# Patient Record
Sex: MALE | Race: OTHER | Employment: UNEMPLOYED | URBAN - METROPOLITAN AREA
[De-identification: names, ages, dates, MRNs, and addresses within clinical notes are randomized per-mention and may not be internally consistent; named-entity substitution may affect disease eponyms.]

---

## 2018-02-05 ENCOUNTER — APPOINTMENT (OUTPATIENT)
Dept: GENERAL RADIOLOGY | Age: 68
DRG: 673 | End: 2018-02-05
Payer: COMMERCIAL

## 2018-02-05 ENCOUNTER — HOSPITAL ENCOUNTER (INPATIENT)
Age: 68
LOS: 22 days | Discharge: HOSPICE | DRG: 673 | End: 2018-02-27
Attending: EMERGENCY MEDICINE | Admitting: INTERNAL MEDICINE
Payer: COMMERCIAL

## 2018-02-05 DIAGNOSIS — G62.9 NEUROPATHY: Primary | ICD-10-CM

## 2018-02-05 DIAGNOSIS — J44.1 COPD EXACERBATION (HCC): ICD-10-CM

## 2018-02-05 DIAGNOSIS — K92.2 GASTROINTESTINAL HEMORRHAGE, UNSPECIFIED GASTROINTESTINAL HEMORRHAGE TYPE: ICD-10-CM

## 2018-02-05 DIAGNOSIS — D64.9 ANEMIA, UNSPECIFIED TYPE: ICD-10-CM

## 2018-02-05 LAB
ABSOLUTE EOS #: 0 K/UL (ref 0–0.4)
ABSOLUTE IMMATURE GRANULOCYTE: ABNORMAL K/UL (ref 0–0.3)
ABSOLUTE LYMPH #: 0.8 K/UL (ref 1–4.8)
ABSOLUTE MONO #: 0.8 K/UL (ref 0.1–1.3)
ANION GAP SERPL CALCULATED.3IONS-SCNC: 12 MMOL/L (ref 9–17)
BASOPHILS # BLD: 1 % (ref 0–2)
BASOPHILS ABSOLUTE: 0.1 K/UL (ref 0–0.2)
BUN BLDV-MCNC: 77 MG/DL (ref 8–23)
BUN/CREAT BLD: ABNORMAL (ref 9–20)
CALCIUM SERPL-MCNC: 9 MG/DL (ref 8.6–10.4)
CHLORIDE BLD-SCNC: 104 MMOL/L (ref 98–107)
CO2: 23 MMOL/L (ref 20–31)
CREAT SERPL-MCNC: 3.7 MG/DL (ref 0.7–1.2)
DIFFERENTIAL TYPE: ABNORMAL
EOSINOPHILS RELATIVE PERCENT: 1 % (ref 0–4)
GFR AFRICAN AMERICAN: 20 ML/MIN
GFR NON-AFRICAN AMERICAN: 16 ML/MIN
GFR SERPL CREATININE-BSD FRML MDRD: ABNORMAL ML/MIN/{1.73_M2}
GFR SERPL CREATININE-BSD FRML MDRD: ABNORMAL ML/MIN/{1.73_M2}
GLUCOSE BLD-MCNC: 87 MG/DL (ref 70–99)
HCT VFR BLD CALC: 21.6 % (ref 41–53)
HEMOGLOBIN: 7 G/DL (ref 13.5–17.5)
IMMATURE GRANULOCYTES: ABNORMAL %
LYMPHOCYTES # BLD: 12 % (ref 24–44)
MCH RBC QN AUTO: 34 PG (ref 26–34)
MCHC RBC AUTO-ENTMCNC: 32.2 G/DL (ref 31–37)
MCV RBC AUTO: 105.7 FL (ref 80–100)
MONOCYTES # BLD: 13 % (ref 1–7)
NRBC AUTOMATED: ABNORMAL PER 100 WBC
PDW BLD-RTO: 16 % (ref 11.5–14.9)
PLATELET # BLD: 234 K/UL (ref 150–450)
PLATELET ESTIMATE: ABNORMAL
PMV BLD AUTO: 7.5 FL (ref 6–12)
POTASSIUM SERPL-SCNC: 5.6 MMOL/L (ref 3.7–5.3)
RBC # BLD: 2.05 M/UL (ref 4.5–5.9)
RBC # BLD: ABNORMAL 10*6/UL
SEG NEUTROPHILS: 73 % (ref 36–66)
SEGMENTED NEUTROPHILS ABSOLUTE COUNT: 5.1 K/UL (ref 1.3–9.1)
SODIUM BLD-SCNC: 139 MMOL/L (ref 135–144)
WBC # BLD: 6.8 K/UL (ref 3.5–11)
WBC # BLD: ABNORMAL 10*3/UL

## 2018-02-05 PROCEDURE — 71046 X-RAY EXAM CHEST 2 VIEWS: CPT

## 2018-02-05 PROCEDURE — 80048 BASIC METABOLIC PNL TOTAL CA: CPT

## 2018-02-05 PROCEDURE — P9016 RBC LEUKOCYTES REDUCED: HCPCS

## 2018-02-05 PROCEDURE — 86920 COMPATIBILITY TEST SPIN: CPT

## 2018-02-05 PROCEDURE — 86850 RBC ANTIBODY SCREEN: CPT

## 2018-02-05 PROCEDURE — 94664 DEMO&/EVAL PT USE INHALER: CPT

## 2018-02-05 PROCEDURE — 99285 EMERGENCY DEPT VISIT HI MDM: CPT

## 2018-02-05 PROCEDURE — 96374 THER/PROPH/DIAG INJ IV PUSH: CPT

## 2018-02-05 PROCEDURE — 36430 TRANSFUSION BLD/BLD COMPNT: CPT

## 2018-02-05 PROCEDURE — 6360000002 HC RX W HCPCS: Performed by: EMERGENCY MEDICINE

## 2018-02-05 PROCEDURE — 86901 BLOOD TYPING SEROLOGIC RH(D): CPT

## 2018-02-05 PROCEDURE — 85025 COMPLETE CBC W/AUTO DIFF WBC: CPT

## 2018-02-05 PROCEDURE — 36415 COLL VENOUS BLD VENIPUNCTURE: CPT

## 2018-02-05 PROCEDURE — 94640 AIRWAY INHALATION TREATMENT: CPT

## 2018-02-05 PROCEDURE — 86900 BLOOD TYPING SEROLOGIC ABO: CPT

## 2018-02-05 PROCEDURE — 2060000000 HC ICU INTERMEDIATE R&B

## 2018-02-05 PROCEDURE — 94762 N-INVAS EAR/PLS OXIMTRY CONT: CPT

## 2018-02-05 RX ORDER — IPRATROPIUM BROMIDE AND ALBUTEROL SULFATE 2.5; .5 MG/3ML; MG/3ML
1 SOLUTION RESPIRATORY (INHALATION)
Status: DISCONTINUED | OUTPATIENT
Start: 2018-02-05 | End: 2018-02-27 | Stop reason: HOSPADM

## 2018-02-05 RX ORDER — 0.9 % SODIUM CHLORIDE 0.9 %
250 INTRAVENOUS SOLUTION INTRAVENOUS ONCE
Status: DISCONTINUED | OUTPATIENT
Start: 2018-02-05 | End: 2018-02-12

## 2018-02-05 RX ORDER — SODIUM CHLORIDE 0.9 % (FLUSH) 0.9 %
10 SYRINGE (ML) INJECTION PRN
Status: DISCONTINUED | OUTPATIENT
Start: 2018-02-05 | End: 2018-02-27 | Stop reason: HOSPADM

## 2018-02-05 RX ORDER — METHYLPREDNISOLONE SODIUM SUCCINATE 125 MG/2ML
125 INJECTION, POWDER, LYOPHILIZED, FOR SOLUTION INTRAMUSCULAR; INTRAVENOUS ONCE
Status: COMPLETED | OUTPATIENT
Start: 2018-02-05 | End: 2018-02-05

## 2018-02-05 RX ORDER — SODIUM CHLORIDE 0.9 % (FLUSH) 0.9 %
10 SYRINGE (ML) INJECTION EVERY 12 HOURS
Status: DISCONTINUED | OUTPATIENT
Start: 2018-02-05 | End: 2018-02-27 | Stop reason: HOSPADM

## 2018-02-05 RX ORDER — IPRATROPIUM BROMIDE AND ALBUTEROL SULFATE 2.5; .5 MG/3ML; MG/3ML
SOLUTION RESPIRATORY (INHALATION)
Status: COMPLETED
Start: 2018-02-05 | End: 2018-02-06

## 2018-02-05 RX ADMIN — METHYLPREDNISOLONE SODIUM SUCCINATE 125 MG: 125 INJECTION, POWDER, FOR SOLUTION INTRAMUSCULAR; INTRAVENOUS at 21:23

## 2018-02-05 ASSESSMENT — ENCOUNTER SYMPTOMS
COUGH: 1
FACIAL SWELLING: 0
EYE DISCHARGE: 0
BACK PAIN: 0
SINUS PRESSURE: 0
SHORTNESS OF BREATH: 1
BLOOD IN STOOL: 0
EYE PAIN: 0
CONSTIPATION: 0
DIARRHEA: 0
CHEST TIGHTNESS: 0
NAUSEA: 0
EYE REDNESS: 0
RHINORRHEA: 0
COLOR CHANGE: 0
WHEEZING: 1
SORE THROAT: 0
VOMITING: 0
TROUBLE SWALLOWING: 0
ABDOMINAL PAIN: 0

## 2018-02-06 ENCOUNTER — APPOINTMENT (OUTPATIENT)
Dept: CT IMAGING | Age: 68
DRG: 673 | End: 2018-02-06
Payer: COMMERCIAL

## 2018-02-06 ENCOUNTER — APPOINTMENT (OUTPATIENT)
Dept: ULTRASOUND IMAGING | Age: 68
DRG: 673 | End: 2018-02-06
Payer: COMMERCIAL

## 2018-02-06 ENCOUNTER — APPOINTMENT (OUTPATIENT)
Dept: GENERAL RADIOLOGY | Age: 68
DRG: 673 | End: 2018-02-06
Payer: COMMERCIAL

## 2018-02-06 PROBLEM — I71.20 ANEURYSM OF THORACIC AORTA: Status: ACTIVE | Noted: 2018-02-06

## 2018-02-06 PROBLEM — I71.40 ABDOMINAL AORTIC ANEURYSM (AAA): Status: ACTIVE | Noted: 2018-02-06

## 2018-02-06 LAB
-: ABNORMAL
ABSOLUTE RETIC #: 0.05 M/UL (ref 0.02–0.1)
ALBUMIN SERPL-MCNC: 2.7 G/DL (ref 3.5–5.2)
ALBUMIN/GLOBULIN RATIO: ABNORMAL (ref 1–2.5)
ALLEN TEST: ABNORMAL
ALP BLD-CCNC: 57 U/L (ref 40–129)
ALT SERPL-CCNC: 14 U/L (ref 5–41)
AMORPHOUS: ABNORMAL
ANION GAP SERPL CALCULATED.3IONS-SCNC: 12 MMOL/L (ref 9–17)
ANION GAP SERPL CALCULATED.3IONS-SCNC: 13 MMOL/L (ref 9–17)
ANION GAP SERPL CALCULATED.3IONS-SCNC: 14 MMOL/L (ref 9–17)
AST SERPL-CCNC: 13 U/L
BACTERIA: ABNORMAL
BILIRUB SERPL-MCNC: <0.15 MG/DL (ref 0.3–1.2)
BILIRUBIN DIRECT: <0.08 MG/DL
BILIRUBIN URINE: NEGATIVE
BILIRUBIN, INDIRECT: ABNORMAL MG/DL (ref 0–1)
BNP INTERPRETATION: ABNORMAL
BUN BLDV-MCNC: 76 MG/DL (ref 8–23)
BUN BLDV-MCNC: 78 MG/DL (ref 8–23)
BUN BLDV-MCNC: 79 MG/DL (ref 8–23)
BUN/CREAT BLD: ABNORMAL (ref 9–20)
CALCIUM SERPL-MCNC: 8.9 MG/DL (ref 8.6–10.4)
CARBOXYHEMOGLOBIN: 0.9 %
CASTS UA: ABNORMAL /LPF
CHLORIDE BLD-SCNC: 103 MMOL/L (ref 98–107)
CHLORIDE BLD-SCNC: 104 MMOL/L (ref 98–107)
CHLORIDE BLD-SCNC: 106 MMOL/L (ref 98–107)
CHLORIDE, UR: 104 MMOL/L
CO2: 23 MMOL/L (ref 20–31)
CO2: 24 MMOL/L (ref 20–31)
CO2: 24 MMOL/L (ref 20–31)
COLOR: YELLOW
COMMENT UA: ABNORMAL
CREAT SERPL-MCNC: 3.74 MG/DL (ref 0.7–1.2)
CREAT SERPL-MCNC: 3.79 MG/DL (ref 0.7–1.2)
CREAT SERPL-MCNC: 3.86 MG/DL (ref 0.7–1.2)
CREATININE URINE: 26.4 MG/DL (ref 39–259)
CRYSTALS, UA: ABNORMAL /HPF
DATE, STOOL #1: NORMAL
DATE, STOOL #2: NORMAL
DATE, STOOL #3: NORMAL
DIRECT EXAM: NORMAL
EOSINOPHIL,URINE: NORMAL
EPITHELIAL CELLS UA: ABNORMAL /HPF
FIO2: ABNORMAL
GFR AFRICAN AMERICAN: 19 ML/MIN
GFR AFRICAN AMERICAN: 19 ML/MIN
GFR AFRICAN AMERICAN: 20 ML/MIN
GFR NON-AFRICAN AMERICAN: 16 ML/MIN
GFR SERPL CREATININE-BSD FRML MDRD: ABNORMAL ML/MIN/{1.73_M2}
GLOBULIN: ABNORMAL G/DL (ref 1.5–3.8)
GLUCOSE BLD-MCNC: 117 MG/DL (ref 70–99)
GLUCOSE BLD-MCNC: 150 MG/DL (ref 75–110)
GLUCOSE BLD-MCNC: 157 MG/DL (ref 70–99)
GLUCOSE BLD-MCNC: 169 MG/DL (ref 70–99)
GLUCOSE URINE: NEGATIVE
HAPTOGLOBIN: 231 MG/DL (ref 30–200)
HCO3 ARTERIAL: 25.1 MMOL/L
HCT VFR BLD CALC: 22.4 % (ref 41–53)
HCT VFR BLD CALC: 23.2 % (ref 41–53)
HCT VFR BLD CALC: 24.8 % (ref 41–53)
HEMOCCULT SP1 STL QL: NEGATIVE
HEMOCCULT SP2 STL QL: NORMAL
HEMOCCULT SP3 STL QL: NORMAL
HEMOGLOBIN: 7.1 G/DL (ref 13.5–17.5)
HEMOGLOBIN: 7.5 G/DL (ref 13.5–17.5)
HEMOGLOBIN: 7.9 G/DL (ref 13.5–17.5)
IMMATURE RETIC FRACT: ABNORMAL %
IRON SATURATION: 13 % (ref 20–55)
IRON: 31 UG/DL (ref 59–158)
KETONES, URINE: NEGATIVE
LACTATE DEHYDROGENASE: 117 U/L (ref 135–225)
LEUKOCYTE ESTERASE, URINE: NEGATIVE
LV EF: 23 %
LVEF MODALITY: NORMAL
Lab: NORMAL
MAGNESIUM: 2.2 MG/DL (ref 1.6–2.6)
MCH RBC QN AUTO: 32.2 PG (ref 26–34)
MCHC RBC AUTO-ENTMCNC: 31.9 G/DL (ref 31–37)
MCV RBC AUTO: 101.2 FL (ref 80–100)
METHEMOGLOBIN: 1.1 %
MODE: ABNORMAL
MUCUS: ABNORMAL
NEGATIVE BASE EXCESS, ART: 2.4 MMOL/L (ref 0–2)
NITRITE, URINE: NEGATIVE
NOTIFICATION TIME: ABNORMAL
NOTIFICATION: ABNORMAL
NRBC AUTOMATED: ABNORMAL PER 100 WBC
O2 DEVICE/FLOW/%: ABNORMAL
O2 SAT, ARTERIAL: 96.4 %
OTHER OBSERVATIONS UA: ABNORMAL
OXYHEMOGLOBIN: ABNORMAL % (ref 95–98)
PATIENT TEMP: 37
PCO2 ARTERIAL: 60.2 MMHG
PCO2, ART, TEMP ADJ: ABNORMAL
PDW BLD-RTO: 17.8 % (ref 11.5–14.9)
PEEP/CPAP: ABNORMAL
PH ARTERIAL: 7.23
PH UA: 5 (ref 5–8)
PH, ART, TEMP ADJ: ABNORMAL
PHOSPHORUS: 6.6 MG/DL (ref 2.5–4.5)
PLATELET # BLD: 234 K/UL (ref 150–450)
PMV BLD AUTO: 7 FL (ref 6–12)
PO2 ARTERIAL: 112 MMHG
PO2, ART, TEMP ADJ: ABNORMAL MMHG
POSITIVE BASE EXCESS, ART: ABNORMAL MMOL/L (ref 0–2)
POTASSIUM SERPL-SCNC: 4.9 MMOL/L (ref 3.7–5.3)
POTASSIUM SERPL-SCNC: 5.4 MMOL/L (ref 3.7–5.3)
POTASSIUM SERPL-SCNC: 5.5 MMOL/L (ref 3.7–5.3)
POTASSIUM SERPL-SCNC: 6 MMOL/L (ref 3.7–5.3)
POTASSIUM, UR: 21.2 MMOL/L
PRO-BNP: ABNORMAL PG/ML
PROTEIN UA: ABNORMAL
PSV: ABNORMAL
PT. POSITION: ABNORMAL
RBC # BLD: 2.45 M/UL (ref 4.5–5.9)
RBC UA: ABNORMAL /HPF
RENAL EPITHELIAL, UA: ABNORMAL /HPF
RESPIRATORY RATE: 20
RETIC %: 2.1 % (ref 0.5–2)
RETIC HEMOGLOBIN: ABNORMAL PG (ref 28.2–35.7)
SAMPLE SITE: ABNORMAL
SET RATE: ABNORMAL
SODIUM BLD-SCNC: 140 MMOL/L (ref 135–144)
SODIUM BLD-SCNC: 141 MMOL/L (ref 135–144)
SODIUM BLD-SCNC: 142 MMOL/L (ref 135–144)
SODIUM,UR: 112 MMOL/L
SPECIFIC GRAVITY UA: 1.01 (ref 1–1.03)
SPECIMEN DESCRIPTION: NORMAL
STATUS: NORMAL
TEXT FOR RESPIRATORY: ABNORMAL
TIME, STOOL #1: NORMAL
TIME, STOOL #2: NORMAL
TIME, STOOL #3: NORMAL
TOTAL CK: 52 U/L (ref 39–308)
TOTAL HB: ABNORMAL G/DL (ref 12–16)
TOTAL IRON BINDING CAPACITY: 230 UG/DL (ref 250–450)
TOTAL PROTEIN, URINE: 153 MG/DL
TOTAL PROTEIN: 6.5 G/DL (ref 6.4–8.3)
TOTAL RATE: ABNORMAL
TRICHOMONAS: ABNORMAL
TROPONIN INTERP: NORMAL
TROPONIN INTERP: NORMAL
TROPONIN T: <0.03 NG/ML
TROPONIN T: <0.03 NG/ML
TURBIDITY: CLEAR
UNSATURATED IRON BINDING CAPACITY: 199 UG/DL (ref 112–347)
UREA NITROGEN, UR: 316 MG/DL
URINE HGB: ABNORMAL
UROBILINOGEN, URINE: NORMAL
VT: ABNORMAL
WBC # BLD: 4.9 K/UL (ref 3.5–11)
WBC UA: ABNORMAL /HPF
YEAST: ABNORMAL

## 2018-02-06 PROCEDURE — 2580000003 HC RX 258: Performed by: INTERNAL MEDICINE

## 2018-02-06 PROCEDURE — 86738 MYCOPLASMA ANTIBODY: CPT

## 2018-02-06 PROCEDURE — 85027 COMPLETE CBC AUTOMATED: CPT

## 2018-02-06 PROCEDURE — 83615 LACTATE (LD) (LDH) ENZYME: CPT

## 2018-02-06 PROCEDURE — 82272 OCCULT BLD FECES 1-3 TESTS: CPT

## 2018-02-06 PROCEDURE — 94762 N-INVAS EAR/PLS OXIMTRY CONT: CPT

## 2018-02-06 PROCEDURE — 84156 ASSAY OF PROTEIN URINE: CPT

## 2018-02-06 PROCEDURE — 84132 ASSAY OF SERUM POTASSIUM: CPT

## 2018-02-06 PROCEDURE — 94640 AIRWAY INHALATION TREATMENT: CPT

## 2018-02-06 PROCEDURE — 6370000000 HC RX 637 (ALT 250 FOR IP): Performed by: STUDENT IN AN ORGANIZED HEALTH CARE EDUCATION/TRAINING PROGRAM

## 2018-02-06 PROCEDURE — 82947 ASSAY GLUCOSE BLOOD QUANT: CPT

## 2018-02-06 PROCEDURE — 84100 ASSAY OF PHOSPHORUS: CPT

## 2018-02-06 PROCEDURE — 85018 HEMOGLOBIN: CPT

## 2018-02-06 PROCEDURE — 82805 BLOOD GASES W/O2 SATURATION: CPT

## 2018-02-06 PROCEDURE — 82436 ASSAY OF URINE CHLORIDE: CPT

## 2018-02-06 PROCEDURE — 83735 ASSAY OF MAGNESIUM: CPT

## 2018-02-06 PROCEDURE — 2060000000 HC ICU INTERMEDIATE R&B

## 2018-02-06 PROCEDURE — 80048 BASIC METABOLIC PNL TOTAL CA: CPT

## 2018-02-06 PROCEDURE — 51798 US URINE CAPACITY MEASURE: CPT

## 2018-02-06 PROCEDURE — 83010 ASSAY OF HAPTOGLOBIN QUANT: CPT

## 2018-02-06 PROCEDURE — 94660 CPAP INITIATION&MGMT: CPT

## 2018-02-06 PROCEDURE — 6360000002 HC RX W HCPCS: Performed by: INTERNAL MEDICINE

## 2018-02-06 PROCEDURE — 87804 INFLUENZA ASSAY W/OPTIC: CPT

## 2018-02-06 PROCEDURE — 36415 COLL VENOUS BLD VENIPUNCTURE: CPT

## 2018-02-06 PROCEDURE — 82607 VITAMIN B-12: CPT

## 2018-02-06 PROCEDURE — 84484 ASSAY OF TROPONIN QUANT: CPT

## 2018-02-06 PROCEDURE — 80076 HEPATIC FUNCTION PANEL: CPT

## 2018-02-06 PROCEDURE — 93306 TTE W/DOPPLER COMPLETE: CPT

## 2018-02-06 PROCEDURE — 6370000000 HC RX 637 (ALT 250 FOR IP): Performed by: INTERNAL MEDICINE

## 2018-02-06 PROCEDURE — 6370000000 HC RX 637 (ALT 250 FOR IP): Performed by: NURSE PRACTITIONER

## 2018-02-06 PROCEDURE — 82746 ASSAY OF FOLIC ACID SERUM: CPT

## 2018-02-06 PROCEDURE — 83550 IRON BINDING TEST: CPT

## 2018-02-06 PROCEDURE — 83880 ASSAY OF NATRIURETIC PEPTIDE: CPT

## 2018-02-06 PROCEDURE — 6370000000 HC RX 637 (ALT 250 FOR IP)

## 2018-02-06 PROCEDURE — 87205 SMEAR GRAM STAIN: CPT

## 2018-02-06 PROCEDURE — 82570 ASSAY OF URINE CREATININE: CPT

## 2018-02-06 PROCEDURE — 6360000002 HC RX W HCPCS: Performed by: NURSE PRACTITIONER

## 2018-02-06 PROCEDURE — 81001 URINALYSIS AUTO W/SCOPE: CPT

## 2018-02-06 PROCEDURE — 71045 X-RAY EXAM CHEST 1 VIEW: CPT

## 2018-02-06 PROCEDURE — 85014 HEMATOCRIT: CPT

## 2018-02-06 PROCEDURE — C9113 INJ PANTOPRAZOLE SODIUM, VIA: HCPCS | Performed by: NURSE PRACTITIONER

## 2018-02-06 PROCEDURE — 2580000003 HC RX 258: Performed by: EMERGENCY MEDICINE

## 2018-02-06 PROCEDURE — 87449 NOS EACH ORGANISM AG IA: CPT

## 2018-02-06 PROCEDURE — 82550 ASSAY OF CK (CPK): CPT

## 2018-02-06 PROCEDURE — 87899 AGENT NOS ASSAY W/OPTIC: CPT

## 2018-02-06 PROCEDURE — 71250 CT THORAX DX C-: CPT

## 2018-02-06 PROCEDURE — 51702 INSERT TEMP BLADDER CATH: CPT

## 2018-02-06 PROCEDURE — 76770 US EXAM ABDO BACK WALL COMP: CPT

## 2018-02-06 PROCEDURE — 99291 CRITICAL CARE FIRST HOUR: CPT | Performed by: INTERNAL MEDICINE

## 2018-02-06 PROCEDURE — 36600 WITHDRAWAL OF ARTERIAL BLOOD: CPT

## 2018-02-06 PROCEDURE — 84540 ASSAY OF URINE/UREA-N: CPT

## 2018-02-06 PROCEDURE — 84300 ASSAY OF URINE SODIUM: CPT

## 2018-02-06 PROCEDURE — 2580000003 HC RX 258: Performed by: NURSE PRACTITIONER

## 2018-02-06 PROCEDURE — 84133 ASSAY OF URINE POTASSIUM: CPT

## 2018-02-06 PROCEDURE — 83540 ASSAY OF IRON: CPT

## 2018-02-06 PROCEDURE — 85045 AUTOMATED RETICULOCYTE COUNT: CPT

## 2018-02-06 PROCEDURE — 94664 DEMO&/EVAL PT USE INHALER: CPT

## 2018-02-06 PROCEDURE — 82728 ASSAY OF FERRITIN: CPT

## 2018-02-06 RX ORDER — OXYCODONE HYDROCHLORIDE AND ACETAMINOPHEN 5; 325 MG/1; MG/1
1 TABLET ORAL EVERY 6 HOURS PRN
Status: DISCONTINUED | OUTPATIENT
Start: 2018-02-06 | End: 2018-02-06

## 2018-02-06 RX ORDER — 0.9 % SODIUM CHLORIDE 0.9 %
10 VIAL (ML) INJECTION DAILY
Status: DISCONTINUED | OUTPATIENT
Start: 2018-02-06 | End: 2018-02-08

## 2018-02-06 RX ORDER — SODIUM BICARBONATE 650 MG/1
650 TABLET ORAL 2 TIMES DAILY
Status: DISCONTINUED | OUTPATIENT
Start: 2018-02-06 | End: 2018-02-12

## 2018-02-06 RX ORDER — TAMSULOSIN HYDROCHLORIDE 0.4 MG/1
0.4 CAPSULE ORAL DAILY
COMMUNITY

## 2018-02-06 RX ORDER — IPRATROPIUM BROMIDE AND ALBUTEROL SULFATE 2.5; .5 MG/3ML; MG/3ML
1 SOLUTION RESPIRATORY (INHALATION)
Status: DISCONTINUED | OUTPATIENT
Start: 2018-02-06 | End: 2018-02-24

## 2018-02-06 RX ORDER — TAMSULOSIN HYDROCHLORIDE 0.4 MG/1
0.4 CAPSULE ORAL DAILY
Status: DISCONTINUED | OUTPATIENT
Start: 2018-02-06 | End: 2018-02-12

## 2018-02-06 RX ORDER — DEXTROSE MONOHYDRATE 25 G/50ML
12.5 INJECTION, SOLUTION INTRAVENOUS PRN
Status: DISCONTINUED | OUTPATIENT
Start: 2018-02-06 | End: 2018-02-27 | Stop reason: HOSPADM

## 2018-02-06 RX ORDER — ESCITALOPRAM OXALATE 20 MG/1
20 TABLET ORAL DAILY
Status: DISCONTINUED | OUTPATIENT
Start: 2018-02-06 | End: 2018-02-12

## 2018-02-06 RX ORDER — SODIUM CHLORIDE 0.9 % (FLUSH) 0.9 %
10 SYRINGE (ML) INJECTION EVERY 12 HOURS SCHEDULED
Status: DISCONTINUED | OUTPATIENT
Start: 2018-02-06 | End: 2018-02-27 | Stop reason: HOSPADM

## 2018-02-06 RX ORDER — NICOTINE POLACRILEX 4 MG
15 LOZENGE BUCCAL PRN
Status: DISCONTINUED | OUTPATIENT
Start: 2018-02-06 | End: 2018-02-27 | Stop reason: HOSPADM

## 2018-02-06 RX ORDER — CARVEDILOL 6.25 MG/1
6.25 TABLET ORAL 2 TIMES DAILY WITH MEALS
COMMUNITY

## 2018-02-06 RX ORDER — DEXTROSE MONOHYDRATE 25 G/50ML
25 INJECTION, SOLUTION INTRAVENOUS ONCE
Status: COMPLETED | OUTPATIENT
Start: 2018-02-06 | End: 2018-02-06

## 2018-02-06 RX ORDER — SODIUM POLYSTYRENE SULFONATE 15 G/60ML
30 SUSPENSION ORAL; RECTAL ONCE
Status: COMPLETED | OUTPATIENT
Start: 2018-02-06 | End: 2018-02-06

## 2018-02-06 RX ORDER — OXYCODONE AND ACETAMINOPHEN 10; 325 MG/1; MG/1
1 TABLET ORAL EVERY 6 HOURS PRN
Status: DISCONTINUED | OUTPATIENT
Start: 2018-02-06 | End: 2018-02-06 | Stop reason: SDUPTHER

## 2018-02-06 RX ORDER — BISACODYL 10 MG
10 SUPPOSITORY, RECTAL RECTAL DAILY PRN
Status: DISCONTINUED | OUTPATIENT
Start: 2018-02-06 | End: 2018-02-27 | Stop reason: HOSPADM

## 2018-02-06 RX ORDER — DEXTROSE MONOHYDRATE 25 G/50ML
12.5 INJECTION, SOLUTION INTRAVENOUS ONCE
Status: COMPLETED | OUTPATIENT
Start: 2018-02-06 | End: 2018-02-06

## 2018-02-06 RX ORDER — ONDANSETRON 2 MG/ML
4 INJECTION INTRAMUSCULAR; INTRAVENOUS EVERY 6 HOURS PRN
Status: DISCONTINUED | OUTPATIENT
Start: 2018-02-06 | End: 2018-02-27 | Stop reason: HOSPADM

## 2018-02-06 RX ORDER — DEXTROSE MONOHYDRATE 50 MG/ML
100 INJECTION, SOLUTION INTRAVENOUS PRN
Status: DISCONTINUED | OUTPATIENT
Start: 2018-02-06 | End: 2018-02-27 | Stop reason: HOSPADM

## 2018-02-06 RX ORDER — M-VIT,TX,IRON,MINS/CALC/FOLIC 27MG-0.4MG
1 TABLET ORAL DAILY
Status: DISCONTINUED | OUTPATIENT
Start: 2018-02-06 | End: 2018-02-12

## 2018-02-06 RX ORDER — OXYCODONE HYDROCHLORIDE 5 MG/1
5 TABLET ORAL EVERY 6 HOURS PRN
Status: DISCONTINUED | OUTPATIENT
Start: 2018-02-06 | End: 2018-02-10

## 2018-02-06 RX ORDER — METHYLPREDNISOLONE SODIUM SUCCINATE 125 MG/2ML
60 INJECTION, POWDER, LYOPHILIZED, FOR SOLUTION INTRAMUSCULAR; INTRAVENOUS EVERY 6 HOURS
Status: DISCONTINUED | OUTPATIENT
Start: 2018-02-06 | End: 2018-02-07

## 2018-02-06 RX ORDER — DIAZEPAM 5 MG/1
5 TABLET ORAL EVERY 12 HOURS PRN
Status: DISCONTINUED | OUTPATIENT
Start: 2018-02-06 | End: 2018-02-22

## 2018-02-06 RX ORDER — NICOTINE 21 MG/24HR
1 PATCH, TRANSDERMAL 24 HOURS TRANSDERMAL DAILY PRN
Status: DISCONTINUED | OUTPATIENT
Start: 2018-02-06 | End: 2018-02-27 | Stop reason: HOSPADM

## 2018-02-06 RX ORDER — SODIUM CHLORIDE 0.9 % (FLUSH) 0.9 %
10 SYRINGE (ML) INJECTION PRN
Status: DISCONTINUED | OUTPATIENT
Start: 2018-02-06 | End: 2018-02-27 | Stop reason: HOSPADM

## 2018-02-06 RX ORDER — ATORVASTATIN CALCIUM 80 MG/1
80 TABLET, FILM COATED ORAL NIGHTLY
Status: DISCONTINUED | OUTPATIENT
Start: 2018-02-06 | End: 2018-02-06

## 2018-02-06 RX ORDER — HEPARIN SODIUM 5000 [USP'U]/ML
5000 INJECTION, SOLUTION INTRAVENOUS; SUBCUTANEOUS EVERY 12 HOURS SCHEDULED
Status: DISCONTINUED | OUTPATIENT
Start: 2018-02-06 | End: 2018-02-08

## 2018-02-06 RX ORDER — ALBUTEROL SULFATE 2.5 MG/3ML
2.5 SOLUTION RESPIRATORY (INHALATION)
Status: DISCONTINUED | OUTPATIENT
Start: 2018-02-06 | End: 2018-02-27 | Stop reason: HOSPADM

## 2018-02-06 RX ORDER — OXYCODONE HYDROCHLORIDE 5 MG/1
5 TABLET ORAL EVERY 6 HOURS PRN
Status: DISCONTINUED | OUTPATIENT
Start: 2018-02-06 | End: 2018-02-06

## 2018-02-06 RX ORDER — FUROSEMIDE 10 MG/ML
40 INJECTION INTRAMUSCULAR; INTRAVENOUS ONCE
Status: COMPLETED | OUTPATIENT
Start: 2018-02-06 | End: 2018-02-06

## 2018-02-06 RX ORDER — ACETAMINOPHEN 325 MG/1
650 TABLET ORAL EVERY 4 HOURS PRN
Status: DISCONTINUED | OUTPATIENT
Start: 2018-02-06 | End: 2018-02-27 | Stop reason: HOSPADM

## 2018-02-06 RX ORDER — ATORVASTATIN CALCIUM 80 MG/1
80 TABLET, FILM COATED ORAL NIGHTLY
Status: DISCONTINUED | OUTPATIENT
Start: 2018-02-06 | End: 2018-02-12

## 2018-02-06 RX ORDER — OXYCODONE HYDROCHLORIDE AND ACETAMINOPHEN 5; 325 MG/1; MG/1
1 TABLET ORAL EVERY 4 HOURS PRN
Status: DISCONTINUED | OUTPATIENT
Start: 2018-02-06 | End: 2018-02-10

## 2018-02-06 RX ORDER — FUROSEMIDE 10 MG/ML
40 INJECTION INTRAMUSCULAR; INTRAVENOUS 2 TIMES DAILY
Status: DISCONTINUED | OUTPATIENT
Start: 2018-02-06 | End: 2018-02-07

## 2018-02-06 RX ORDER — FERROUS SULFATE 325(65) MG
325 TABLET ORAL 2 TIMES DAILY
Status: DISCONTINUED | OUTPATIENT
Start: 2018-02-06 | End: 2018-02-06

## 2018-02-06 RX ORDER — SODIUM BICARBONATE 650 MG/1
650 TABLET ORAL 2 TIMES DAILY
COMMUNITY

## 2018-02-06 RX ORDER — PANTOPRAZOLE SODIUM 40 MG/10ML
40 INJECTION, POWDER, LYOPHILIZED, FOR SOLUTION INTRAVENOUS DAILY
Status: DISCONTINUED | OUTPATIENT
Start: 2018-02-06 | End: 2018-02-06

## 2018-02-06 RX ORDER — SODIUM BICARBONATE 650 MG/1
650 TABLET ORAL 2 TIMES DAILY
Status: DISCONTINUED | OUTPATIENT
Start: 2018-02-06 | End: 2018-02-06

## 2018-02-06 RX ORDER — HYDRALAZINE HYDROCHLORIDE 10 MG/1
10 TABLET, FILM COATED ORAL 3 TIMES DAILY
COMMUNITY

## 2018-02-06 RX ORDER — HYDRALAZINE HYDROCHLORIDE 10 MG/1
10 TABLET, FILM COATED ORAL 3 TIMES DAILY
Status: DISCONTINUED | OUTPATIENT
Start: 2018-02-06 | End: 2018-02-07

## 2018-02-06 RX ORDER — CARVEDILOL 6.25 MG/1
6.25 TABLET ORAL 2 TIMES DAILY WITH MEALS
Status: DISCONTINUED | OUTPATIENT
Start: 2018-02-06 | End: 2018-02-12

## 2018-02-06 RX ORDER — ASPIRIN 81 MG/1
81 TABLET ORAL DAILY
Status: DISCONTINUED | OUTPATIENT
Start: 2018-02-06 | End: 2018-02-08

## 2018-02-06 RX ORDER — FERROUS SULFATE 325(65) MG
325 TABLET ORAL 2 TIMES DAILY
Status: DISCONTINUED | OUTPATIENT
Start: 2018-02-06 | End: 2018-02-07

## 2018-02-06 RX ADMIN — METHYLPREDNISOLONE SODIUM SUCCINATE 60 MG: 125 INJECTION, POWDER, FOR SOLUTION INTRAMUSCULAR; INTRAVENOUS at 01:36

## 2018-02-06 RX ADMIN — IPRATROPIUM BROMIDE AND ALBUTEROL SULFATE 1 AMPULE: .5; 3 SOLUTION RESPIRATORY (INHALATION) at 07:15

## 2018-02-06 RX ADMIN — FERROUS SULFATE TAB 325 MG (65 MG ELEMENTAL FE) 325 MG: 325 (65 FE) TAB at 20:51

## 2018-02-06 RX ADMIN — ATORVASTATIN CALCIUM 80 MG: 80 TABLET, FILM COATED ORAL at 20:51

## 2018-02-06 RX ADMIN — METHYLPREDNISOLONE SODIUM SUCCINATE 60 MG: 125 INJECTION, POWDER, FOR SOLUTION INTRAMUSCULAR; INTRAVENOUS at 14:51

## 2018-02-06 RX ADMIN — IPRATROPIUM BROMIDE AND ALBUTEROL SULFATE 1 AMPULE: .5; 3 SOLUTION RESPIRATORY (INHALATION) at 19:59

## 2018-02-06 RX ADMIN — FERROUS SULFATE TAB 325 MG (65 MG ELEMENTAL FE) 325 MG: 325 (65 FE) TAB at 08:28

## 2018-02-06 RX ADMIN — METHYLPREDNISOLONE SODIUM SUCCINATE 60 MG: 125 INJECTION, POWDER, FOR SOLUTION INTRAMUSCULAR; INTRAVENOUS at 21:00

## 2018-02-06 RX ADMIN — SODIUM CHLORIDE 10 ML: 9 INJECTION, SOLUTION INTRAMUSCULAR; INTRAVENOUS; SUBCUTANEOUS at 08:28

## 2018-02-06 RX ADMIN — ASPIRIN 81 MG: 81 TABLET, COATED ORAL at 08:28

## 2018-02-06 RX ADMIN — MULTIPLE VITAMINS W/ MINERALS TAB 1 TABLET: TAB at 08:28

## 2018-02-06 RX ADMIN — HYDRALAZINE HYDROCHLORIDE 10 MG: 10 TABLET, FILM COATED ORAL at 14:51

## 2018-02-06 RX ADMIN — FUROSEMIDE 40 MG: 10 INJECTION, SOLUTION INTRAVENOUS at 16:26

## 2018-02-06 RX ADMIN — Medication 2 PUFF: at 20:52

## 2018-02-06 RX ADMIN — CARVEDILOL 6.25 MG: 6.25 TABLET, FILM COATED ORAL at 08:29

## 2018-02-06 RX ADMIN — METHYLPREDNISOLONE SODIUM SUCCINATE 60 MG: 125 INJECTION, POWDER, FOR SOLUTION INTRAMUSCULAR; INTRAVENOUS at 08:28

## 2018-02-06 RX ADMIN — SODIUM POLYSTYRENE SULFONATE 30 G: 15 SUSPENSION ORAL; RECTAL at 06:53

## 2018-02-06 RX ADMIN — IPRATROPIUM BROMIDE AND ALBUTEROL SULFATE 1 AMPULE: .5; 3 SOLUTION RESPIRATORY (INHALATION) at 15:09

## 2018-02-06 RX ADMIN — PANTOPRAZOLE SODIUM 40 MG: 40 INJECTION, POWDER, FOR SOLUTION INTRAVENOUS at 08:28

## 2018-02-06 RX ADMIN — ESCITALOPRAM OXALATE 20 MG: 20 TABLET ORAL at 08:28

## 2018-02-06 RX ADMIN — TAMSULOSIN HYDROCHLORIDE 0.4 MG: 0.4 CAPSULE ORAL at 08:29

## 2018-02-06 RX ADMIN — Medication 10 ML: at 07:47

## 2018-02-06 RX ADMIN — INSULIN LISPRO 1 UNITS: 100 INJECTION, SOLUTION INTRAVENOUS; SUBCUTANEOUS at 21:15

## 2018-02-06 RX ADMIN — CARVEDILOL 6.25 MG: 6.25 TABLET, FILM COATED ORAL at 16:26

## 2018-02-06 RX ADMIN — ENOXAPARIN SODIUM 30 MG: 30 INJECTION SUBCUTANEOUS at 08:28

## 2018-02-06 RX ADMIN — HYDRALAZINE HYDROCHLORIDE 10 MG: 10 TABLET, FILM COATED ORAL at 08:28

## 2018-02-06 RX ADMIN — INSULIN HUMAN 8 UNITS: 100 INJECTION, SOLUTION PARENTERAL at 18:36

## 2018-02-06 RX ADMIN — HYDRALAZINE HYDROCHLORIDE 10 MG: 10 TABLET, FILM COATED ORAL at 20:51

## 2018-02-06 RX ADMIN — FUROSEMIDE 40 MG: 10 INJECTION, SOLUTION INTRAVENOUS at 11:18

## 2018-02-06 RX ADMIN — Medication 10 ML: at 01:37

## 2018-02-06 RX ADMIN — SODIUM BICARBONATE 650 MG: 650 TABLET, ORALLY DISINTEGRATING ORAL at 08:28

## 2018-02-06 RX ADMIN — Medication 2 PUFF: at 08:29

## 2018-02-06 RX ADMIN — Medication 10 ML: at 08:29

## 2018-02-06 RX ADMIN — DEXTROSE MONOHYDRATE 25 G: 25 INJECTION, SOLUTION INTRAVENOUS at 07:44

## 2018-02-06 RX ADMIN — SODIUM POLYSTYRENE SULFONATE 30 G: 15 SUSPENSION ORAL; RECTAL at 18:45

## 2018-02-06 RX ADMIN — Medication 10 ML: at 21:04

## 2018-02-06 RX ADMIN — Medication 2 PUFF: at 01:37

## 2018-02-06 RX ADMIN — FUROSEMIDE 40 MG: 10 INJECTION, SOLUTION INTRAVENOUS at 01:36

## 2018-02-06 RX ADMIN — ALBUTEROL SULFATE 2.5 MG: 2.5 SOLUTION RESPIRATORY (INHALATION) at 04:16

## 2018-02-06 RX ADMIN — IPRATROPIUM BROMIDE AND ALBUTEROL SULFATE 1 AMPULE: .5; 3 SOLUTION RESPIRATORY (INHALATION) at 11:19

## 2018-02-06 RX ADMIN — DEXTROSE MONOHYDRATE 12.5 G: 25 INJECTION, SOLUTION INTRAVENOUS at 18:35

## 2018-02-06 RX ADMIN — INSULIN HUMAN 10 UNITS: 100 INJECTION, SOLUTION PARENTERAL at 07:44

## 2018-02-06 RX ADMIN — SODIUM BICARBONATE 650 MG: 650 TABLET, ORALLY DISINTEGRATING ORAL at 20:51

## 2018-02-06 ASSESSMENT — ENCOUNTER SYMPTOMS
COUGH: 1
ABDOMINAL PAIN: 0
CONSTIPATION: 1
SPUTUM PRODUCTION: 1
WHEEZING: 1
VOMITING: 0
ORTHOPNEA: 0
HEMOPTYSIS: 0
NAUSEA: 0
HEARTBURN: 0
BLOOD IN STOOL: 1
SHORTNESS OF BREATH: 1

## 2018-02-06 NOTE — PROGRESS NOTES
· Bronchodilator assessment   [x]    Bronchodilator Assessment        Bronchodilator assessment at level  prnBRONCHODILATOR ASSESSMENT SCORE  Score 1 2 3 4   Breath Sounds   []  Clear [x]  Mild Wheezing with good aeration []  Moderate I/E wheezing with adequate aeration []  Poor Aeration or diffuse wheezing   Respiratory Rate [x]  Less than 20 [x]  20-25 []  Greater than 25  []  Greater than 35    Dyspnea []  No SOB  [x]  SOB with minimal activity []  Speaking in partial sentences []  Acute/ At rest   Peakflow (asthma) []  80 % or greater predicted/PB  []  Unable []  70% or greater predicted/PB  []  Unable []  51%-70% predicted/PB  []  Unable []  Less than 50% predicted/PB  []  Unable due to distress   FEV1 % Predicted []  Greater than 69%  []  Unable  []  Less than 50%-69%  []  Unable  []  Less than 35%-49%  []  Unable  []  Less than 35%  []  Unable due to distress

## 2018-02-06 NOTE — PLAN OF CARE
Problem: Discharge Planning:  Goal: Discharged to appropriate level of care  Discharged to appropriate level of care   Outcome: Ongoing  Ongoing    Problem: Activity Intolerance:  Goal: Ability to tolerate increased activity will improve  Ability to tolerate increased activity will improve   Outcome: Ongoing  Pt is able to stand at bedside so far has yet to see him walk to bathroom. Problem: Airway Clearance - Ineffective:  Goal: Ability to maintain a clear airway will improve  Ability to maintain a clear airway will improve   Outcome: Ongoing  Pt has been able to keep a clear airway and is able to cough secretions up     Problem: Breathing Pattern - Ineffective:  Goal: Ability to achieve and maintain a regular respiratory rate will improve  Ability to achieve and maintain a regular respiratory rate will improve   Outcome: Ongoing  Pt is still breathing fast but SpO2 has been in the 90s since he arrived and tolerating the venti mask well . Problem: Gas Exchange - Impaired:  Goal: Levels of oxygenation will improve  Levels of oxygenation will improve   Outcome: Ongoing  Pt's SpO2 have been in the 90s since he arrived and has been tolerating venti mask well.

## 2018-02-06 NOTE — PROGRESS NOTES
Pt seen and examined at bedside. Resting comfortably on BipAP. Wheezing and ronchi still present. Vitals stable. Nursing present in room. Intake/Output: 1600 out, 360 in. New orders from Dr. Destiny Lynch for 1500 fluid restriction. BS's 169, on steroids, starting patient on low dose sliding scale.

## 2018-02-06 NOTE — ED NOTES
Pt is an alert and oriented x4 male who presents to the ER c/o increased SOB x2 weeks. Pt has a hx od COPD and wears 3L NC around the clock. Pt originally was sating at 83% on his oxygen when he came in. Pt was seen by Dr. Beauford Crigler on Thursday who ordered a chest x ray and lab work. Pt was placed on laveaflocacin and a tappered dose of prednisone. Pt states he has not gotten any better. Lung sounds are diminished and wheezes throughout all lung fields. Pt has new swelling in bilateral lower extremities.  [facility], [address] [place] [address]  02/05/18 2055

## 2018-02-06 NOTE — CONSULTS
injection 10 mL Q12H   sodium chloride flush 0.9 % injection 10 mL PRN       Allergies:  Codeine and Contrast [iodides]    Social History:   Social History     Social History    Marital status:      Spouse name: N/A    Number of children: N/A    Years of education: N/A     Occupational History    Not on file. Social History Main Topics    Smoking status: Current Every Day Smoker     Packs/day: 2.00     Types: Cigarettes    Smokeless tobacco: Not on file    Alcohol use No    Drug use: No    Sexual activity: Not on file     Other Topics Concern    Not on file     Social History Narrative    No narrative on file       Family History:   No family history on file. Review of Systems:    Constitutional: No fever, no chills, no night sweats, fatigue, generalized weakness, loss of appetite  HEENT:  No headache, otalgia, itchy eyes, epistaxis, nasal discharge or sore throat. Cardiac:  No chest pain, dyspnea, orthopnea or PND, palpitations  Chest:              No cough, hemoptysis, pleuritic chest pain, wheezing,SOB  Abdomen:  Pos abdominal pain, nausea, vomiting, diarrhea, melena, dysphagia hematemesis,constipation,   Neuro:              No CVA, TIA or seizure like activity. Skin:   No rashes, no itching. :   No hematuria, no pyuria, no dysuria, no flank pain. Extremities:  No swelling or joint pains.       Objective:  CURRENT TEMPERATURE:  Temp: 97.5 °F (36.4 °C)  MAXIMUM TEMPERATURE OVER 24HRS:  Temp (24hrs), Av.9 °F (36.6 °C), Min:97.5 °F (36.4 °C), Max:98.3 °F (36.8 °C)    CURRENT RESPIRATORY RATE:  Resp: 20  CURRENT PULSE:  Pulse: 80  CURRENT BLOOD PRESSURE:  BP: (!) 162/61  24HR BLOOD PRESSURE RANGE:  Systolic (39BMJ), PY , Min:133 , TTA:395   ; Diastolic (89ZWI), YA, Min:57, Max:83    24HR INTAKE/OUTPUT:    Intake/Output Summary (Last 24 hours) at 18 1015  Last data filed at 18 0055   Gross per 24 hour   Intake           181.67 ml   Output                0 ml   Net

## 2018-02-06 NOTE — ED PROVIDER NOTES
numbness and headaches. Psychiatric/Behavioral: Negative for confusion, decreased concentration, hallucinations, self-injury, sleep disturbance and suicidal ideas. PAST MEDICAL HISTORY     Past Medical History:   Diagnosis Date    Abdominal pain, chronic, generalized     Cervicodynia     Chronic back pain     Chronic kidney disease     secondary to renal vascular disease    Cigarette smoker     COPD [facility]chronic obstructive pulmonary disease) ([facility]     DDD (degenerative disc disease), lumbar     Depression     Family history of cancer     Hernia, ventral     Hyperlipidemia     Iron deficiency anemia     Lichen simplex chronicus     PUD (peptic ulcer disease)     Renal cyst     Type II or unspecified type diabetes mellitus without mention of complication, not stated as uncontrolled ([facility]     Vitamin D deficiency        SURGICAL HISTORY       Past Surgical History:   Procedure Laterality Date    ABDOMINAL AORTIC ANEURYSM REPAIR         CURRENT MEDICATIONS       Previous Medications    ALBUTEROL (PROVENTIL) (2.5 MG/3ML) 0.083% NEBULIZER SOLUTION    Take 2.5 mg by nebulization every 6 hours as needed. ASPIRIN 81 MG EC TABLET    Take 81 mg by mouth daily. DIAZEPAM (VALIUM) 5 MG TABLET    Take 5 mg by mouth every 12 hours as needed. ESCITALOPRAM (LEXAPRO) 20 MG TABLET    Take 20 mg by mouth daily. FAMOTIDINE (PEPCID) 20 MG TABLET    Take 20 mg by mouth 2 times daily. FLUTICASONE-SALMETEROL (ADVAIR DISKUS) 500-50 MCG/DOSE DISKUS INHALER    Inhale 1 puff into the lungs 2 times daily. IRON (FEOSOL PO)    Take 325 mg by mouth 2 times daily. LEVALBUTEROL (XOPENEX HFA) 45 MCG/ACT INHALER    Inhale 1-2 puffs into the lungs every 4 hours as needed. OXYCODONE-ACETAMINOPHEN (PERCOCET)  MG PER TABLET        ROSUVASTATIN (CRESTOR) 20 MG TABLET    Take 20 mg by mouth daily. THERAPEUTIC MULTIVITAMIN-MINERALS (THERAGRAN-M) TABLET    Take 1 tablet by mouth daily. TIOTROPIUM (SPIRIVA) 18 MCG INHALATION CAPSULE    Inhale 18 mcg into the lungs daily. ALLERGIES     is allergic to codeine and contrast [iodides]. SOCIAL HISTORY      reports that he has been smoking Cigarettes. He has been smoking about 2.00 packs per day. He does not have any smokeless tobacco history on file. He reports that he does not drink alcohol or use drugs. PHYSICAL EXAM     INITIAL VITALS: BP (!) 147/69   Pulse 77   Temp 97.9 °F (36.6 °C) (Oral)   Resp 22   SpO2 97%      Physical Exam   Constitutional: He is oriented to person, place, and time. He appears well-developed and well-nourished. No distress. HENT:   Head: Normocephalic and atraumatic. Eyes: Conjunctivae and EOM are normal. Pupils are equal, round, and reactive to light. Right eye exhibits no discharge. Left eye exhibits no discharge. No scleral icterus. Cardiovascular: Normal rate, regular rhythm and normal heart sounds. Exam reveals no gallop and no friction rub. No murmur heard. Pulmonary/Chest: Accessory muscle usage present. Tachypnea noted. He is in respiratory distress. He has decreased breath sounds. He has wheezes. He has no rhonchi. He has no rales. He exhibits no tenderness. Abdominal: Soft. Bowel sounds are normal. He exhibits no distension and no mass. There is no tenderness. There is no rebound and no guarding. Musculoskeletal: Normal range of motion. He exhibits no edema or tenderness. Neurological: He is alert and oriented to person, place, and time. He displays normal reflexes. No cranial nerve deficit. He exhibits normal muscle tone. Coordination normal.   Skin: Skin is warm and dry. No rash noted. He is not diaphoretic. No erythema. No pallor. Psychiatric: He has a normal mood and affect. His behavior is normal. Judgment and thought content normal.   Nursing note and vitals reviewed.       DIAGNOSTIC RESULTS     RADIOLOGY:All plain film, CT, MRI, and formal ultrasound images (except ED 2015 02/05/18 2030 02/05/18 2115   BP: (!) 147/76 (!) 141/74 (!) 142/69 (!) 147/69   Pulse:   81 77   Resp:   22 22   Temp:       TempSrc:       SpO2: 100% 100% 100% 97%       The patient was given the following medications while in the emergency department:  Orders Placed This Encounter   Medications    methylPREDNISolone sodium (SOLU-MEDROL) injection 125 mg    ipratropium-albuterol (DUONEB) 0.5-2.5 (3) MG/3ML nebulizer solution     SUREKHA CLINTON: cabinet override    ipratropium-albuterol (DUONEB) nebulizer solution 1 ampule    0.9 % sodium chloride bolus    sodium chloride flush 0.9 % injection 10 mL    sodium chloride flush 0.9 % injection 10 mL       -------------------------  9:48 PM  Patient was reevaluated and he did mention that he has noticed dark stools for the last 2 weeks. I told him that I would like to do a rectal exam to evaluate this and at this time he is refusing. He says he would rather wait and just give a stool sample. Since the patient's being admitted I am okay with this plan. I discussed the case with Dr. Josué Gould who is on call for his pulmonologist who is okay with the current plan with breathing treatments steroid and continuing his oral Levaquin as previously prescribed. Also spoke with Az Bennett the nurse practitioner who will admit the patient for further evaluation and treatment. CRITICAL CARE: There was a high probability of clinically significant/life threatening deterioration in this patient's condition which required my urgent intervention. Total critical care time was 30 minutes. This excludes any time for separately reportable procedures. CONSULTS:  IP CONSULT TO PRIMARY CARE PROVIDER  IP CONSULT TO PULMONOLOGY    PROCEDURES:  none    FINAL IMPRESSION      1. COPD exacerbation ([facility]    2. Gastrointestinal hemorrhage, unspecified gastrointestinal hemorrhage type    3.  Anemia, unspecified type          DISPOSITION/PLAN   DISPOSITION Decision To Admit

## 2018-02-06 NOTE — H&P
[address] [address]    HISTORY AND PHYSICAL EXAMINATION            Date:   2/6/2018  Patient name:  Venita Alexander  Date of admission:  2/5/2018  7:29 PM  MRN:   [id number]  Account:  [de-identified]  YOB: 1950  PCP:    Ran Perez MD  Room:   2012/2012-01  Code Status:    Full Code    Chief Complaint:     Chief Complaint   Patient presents with    Shortness of Breath       History Obtained From:     patient    History of Present Illness: The patient is a 75 y.o. Non-/non  male wit hPMHx of COPD on 3 LPM, Systolic CHF, and CKD baseline Cr of 2.90 who presents with Shortness of Breath  Patient was at PCP on 2/1 for similar complaint, and was given Levaquin and medrol dose pack PO. At the time, he had a productive cough, and chest pain with coughing. He had no improvement in his Sx, and came to ED for revaluation. In the ED, his O2 sat was at 85% on 3 LPM. He was given 4 LPM by Venturi mask. Pulmonology was consulted. CXR showed small B/L Pleural effusions and R sided hilar density. His Cr was also elevated at 3.70, so nephrology was consulted. Hgb was 7.0 in ED and he was given 1 U PRBC. Pt denied SANJAY, but reports possible bloody stools. He was given IV solumedrol bolus, and admitted to ICU intermediate   for the management of  COPD exacerbation with failure of outpatient treatment.         Past Medical History:     Past Medical History:   Diagnosis Date    Abdominal pain, chronic, generalized     Cervicodynia     Chronic back pain     Chronic kidney disease     secondary to renal vascular disease    Cigarette smoker     COPD (chronic obstructive pulmonary disease) (HCC)     DDD (degenerative disc disease), lumbar     Depression     Family history of cancer     Hernia, ventral     Hyperlipidemia     Iron deficiency anemia     Lichen simplex chronicus     PUD (peptic ulcer disease)     Renal cyst to light. Cardiovascular: Normal rate, regular rhythm, S1 normal and S2 normal.  Exam reveals distant heart sounds and decreased pulses. Pulmonary/Chest: Effort normal. He has wheezes (Diffuse end expiratory wheezes). Abdominal: He exhibits distension and mass. Bowel sounds are hypoactive. There is no tenderness. There is no rigidity and no guarding. Neurological: He is alert and oriented to person, place, and time. GCS score is 15. Skin: Skin is warm and dry.          Investigations:      Laboratory Testing:  Recent Results (from the past 24 hour(s))   Basic Metabolic Panel    Collection Time: 02/05/18  9:10 PM   Result Value Ref Range    Glucose 87 70 - 99 mg/dL    BUN 77 (H) 8 - 23 mg/dL    CREATININE 3.70 (H) 0.70 - 1.20 mg/dL    Bun/Cre Ratio NOT REPORTED 9 - 20    Calcium 9.0 8.6 - 10.4 mg/dL    Sodium 139 135 - 144 mmol/L    Potassium 5.6 (H) 3.7 - 5.3 mmol/L    Chloride 104 98 - 107 mmol/L    CO2 23 20 - 31 mmol/L    Anion Gap 12 9 - 17 mmol/L    GFR Non-African American 16 (L) >60 mL/min    GFR African American 20 (L) >60 mL/min    GFR Comment          GFR Staging NOT REPORTED    CBC Auto Differential    Collection Time: 02/05/18  9:10 PM   Result Value Ref Range    WBC 6.8 3.5 - 11.0 k/uL    RBC 2.05 (L) 4.5 - 5.9 m/uL    Hemoglobin 7.0 (LL) 13.5 - 17.5 g/dL    Hematocrit 21.6 (L) 41 - 53 %    .7 (H) 80 - 100 fL    MCH 34.0 26 - 34 pg    MCHC 32.2 31 - 37 g/dL    RDW 16.0 (H) 11.5 - 14.9 %    Platelets 999 815 - 788 k/uL    MPV 7.5 6.0 - 12.0 fL    NRBC Automated NOT REPORTED per 100 WBC    Differential Type NOT REPORTED     Immature Granulocytes NOT REPORTED 0 %    Absolute Immature Granulocyte NOT REPORTED 0.00 - 0.30 k/uL    WBC Morphology NOT REPORTED     RBC Morphology NOT REPORTED     Platelet Estimate NOT REPORTED     Seg Neutrophils 73 (H) 36 - 66 %    Lymphocytes 12 (L) 24 - 44 %    Monocytes 13 (H) 1 - 7 %    Eosinophils % 1 0 - 4 %    Basophils 1 0 - 2 %    Segs Absolute 5. 10 1.3 - 9.1 k/uL    Absolute Lymph # 0.80 (L) 1.0 - 4.8 k/uL    Absolute Mono # 0.80 0.1 - 1.3 k/uL    Absolute Eos # 0.00 0.0 - 0.4 k/uL    Basophils # 0.10 0.0 - 0.2 k/uL   TYPE AND SCREEN    Collection Time: 02/05/18  9:55 PM   Result Value Ref Range    Expiration Date 02/08/2018     Arm Band Number [id number]     ABO/Rh O POSITIVE     Antibody Screen NEGATIVE     Unit Number [id number]     Product Code Leukocyte Reduced Red Cell     Unit Divison 0     Dispense Status ISSUED     Transfusion Status OK TO TRANSFUSE     Crossmatch Result COMPATIBLE    Basic Metabolic Panel w/ Reflex to MG    Collection Time: 02/06/18  4:24 AM   Result Value Ref Range    Glucose 117 (H) 70 - 99 mg/dL    BUN 78 (H) 8 - 23 mg/dL    CREATININE 3.79 (H) 0.70 - 1.20 mg/dL    Bun/Cre Ratio NOT REPORTED 9 - 20    Calcium 8.9 8.6 - 10.4 mg/dL    Sodium 141 135 - 144 mmol/L    Potassium 6.0 (HH) 3.7 - 5.3 mmol/L    Chloride 106 98 - 107 mmol/L    CO2 23 20 - 31 mmol/L    Anion Gap 12 9 - 17 mmol/L    GFR Non-African American 16 (L) >60 mL/min    GFR  19 (L) >60 mL/min    GFR Comment          GFR Staging NOT REPORTED    CBC    Collection Time: 02/06/18  4:24 AM   Result Value Ref Range    WBC 4.9 3.5 - 11.0 k/uL    RBC 2.45 (L) 4.5 - 5.9 m/uL    Hemoglobin 7.9 (L) 13.5 - 17.5 g/dL    Hematocrit 24.8 (L) 41 - 53 %    .2 (H) 80 - 100 fL    MCH 32.2 26 - 34 pg    MCHC 31.9 31 - 37 g/dL    RDW 17.8 (H) 11.5 - 14.9 %    Platelets 834 185 - 599 k/uL    MPV 7.0 6.0 - 12.0 fL    NRBC Automated NOT REPORTED per 100 WBC   Brain Natriuretic Peptide    Collection Time: 02/06/18  4:24 AM   Result Value Ref Range    Pro-BNP 59,238 (H) <300 pg/mL    BNP Interpretation         Basic Metabolic Prof    Collection Time: 02/06/18  8:40 AM   Result Value Ref Range    Glucose 157 (H) 70 - 99 mg/dL    BUN 76 (H) 8 - 23 mg/dL    CREATININE 3.74 (H) 0.70 - 1.20 mg/dL    Bun/Cre Ratio NOT REPORTED 9 - 20    Calcium 8.9 8.6 - 10.4 mg/dL

## 2018-02-06 NOTE — CARE COORDINATION
CASE MANAGEMENT NOTE:    Admission Date:  2/5/2018 Golden Montejo is a 75 y.o.  male    Admitted for : COPD exacerbation ([facility] [J44.1]  COPD exacerbation ([facility] [J44.1]  COPD exacerbation ([facility] [J44.1]    Met with:  Patient    PCP:  Edmundo Negron                                Insurance:  [employer]      Current Residence/ Living Arrangements:  independently at home  -with spouse, has ramp into           Current Services PTA:  No    Is patient agreeable to VNS: No    Freedom of choice provided: Yes         VNS chosen:  No    DME:  Wheelchair, ramp    Home Oxygen: Yes    Nebulizer: Yes    Supplier: N/A    Potential Assistance Needed: No    SNF needed: No    Pharmacy:  [facility]       Does Patient want to use MEDS to BEDS? No    Family Members/Caregivers that pt would like involved in their care:    Yes    If yes, list name here:  spouse    Transportation Provider:  spouse                      Discharge Plan:  Return home, here with exacerbation COPD, elevated creat 3.7, nephrology to see. hgb was 7 , 1 unit of PRBC given               Readmission Risk              Readmission Risk:        23       Age 72 or Greater:  1    Admitted from SNF or Requires Paid or Family Care:  0    Currently has CHF,COPD,ARF,CRI,or is on dialysis:  4    Takes more than 5 Prescription Medications:  4    Takes Digoxin,Insulin,Anticoagulants,Narcotics or ASA/Plavix:  [facility] in Past 12 Months:  0    On Disability:  3    Patient Considers own Health:  5          Electronically signed by:  Vicky Leyva RN on 2/6/2018 at 8:53 AM

## 2018-02-06 NOTE — FLOWSHEET NOTE
02/06/18 1525   Encounter Summary   Services provided to: Patient not available  (Sleeping)   Referral/Consult From: Rounding   Routine   Type Initial   Assessment Sleeping

## 2018-02-07 LAB
ALLEN TEST: ABNORMAL
ANION GAP SERPL CALCULATED.3IONS-SCNC: 12 MMOL/L (ref 9–17)
BUN BLDV-MCNC: 77 MG/DL (ref 8–23)
BUN/CREAT BLD: ABNORMAL (ref 9–20)
CALCIUM SERPL-MCNC: 8.7 MG/DL (ref 8.6–10.4)
CARBOXYHEMOGLOBIN: 1.1 % (ref 0–5)
CHLORIDE BLD-SCNC: 107 MMOL/L (ref 98–107)
CO2: 27 MMOL/L (ref 20–31)
COMPLEMENT C3: 91 MG/DL (ref 90–180)
COMPLEMENT C4: 23 MG/DL (ref 10–40)
CREAT SERPL-MCNC: 3.9 MG/DL (ref 0.7–1.2)
FERRITIN: 153 UG/L (ref 30–400)
FIO2: ABNORMAL
FOLATE: >20 NG/ML
GFR AFRICAN AMERICAN: 19 ML/MIN
GFR NON-AFRICAN AMERICAN: 15 ML/MIN
GFR SERPL CREATININE-BSD FRML MDRD: ABNORMAL ML/MIN/{1.73_M2}
GFR SERPL CREATININE-BSD FRML MDRD: ABNORMAL ML/MIN/{1.73_M2}
GLUCOSE BLD-MCNC: 113 MG/DL (ref 75–110)
GLUCOSE BLD-MCNC: 144 MG/DL (ref 70–99)
GLUCOSE BLD-MCNC: 153 MG/DL (ref 75–110)
GLUCOSE BLD-MCNC: 158 MG/DL (ref 75–110)
GLUCOSE BLD-MCNC: 330 MG/DL (ref 75–110)
HCO3 ARTERIAL: 27.9 MMOL/L (ref 22–26)
HCT VFR BLD CALC: 21.3 % (ref 41–53)
HCT VFR BLD CALC: 21.7 % (ref 41–53)
HCT VFR BLD CALC: 21.8 % (ref 41–53)
HCT VFR BLD CALC: 23.9 % (ref 41–53)
HCT VFR BLD CALC: 23.9 % (ref 41–53)
HEMOGLOBIN: 6.8 G/DL (ref 13.5–17.5)
HEMOGLOBIN: 6.9 G/DL (ref 13.5–17.5)
HEMOGLOBIN: 7.1 G/DL (ref 13.5–17.5)
HEMOGLOBIN: 7.5 G/DL (ref 13.5–17.5)
HEMOGLOBIN: 7.6 G/DL (ref 13.5–17.5)
HEPATITIS B SURFACE ANTIGEN: NONREACTIVE
HEPATITIS C ANTIBODY: NONREACTIVE
MCH RBC QN AUTO: 32.1 PG (ref 26–34)
MCHC RBC AUTO-ENTMCNC: 31.7 G/DL (ref 31–37)
MCV RBC AUTO: 101 FL (ref 80–100)
METHEMOGLOBIN: 1.4 % (ref 0–1.9)
MODE: ABNORMAL
MYCOPLASMA PNEUMONIAE IGM: 0.37
NEGATIVE BASE EXCESS, ART: ABNORMAL MMOL/L (ref 0–2)
NOTIFICATION TIME: ABNORMAL
NOTIFICATION: ABNORMAL
NRBC AUTOMATED: ABNORMAL PER 100 WBC
O2 DEVICE/FLOW/%: ABNORMAL
O2 SAT, ARTERIAL: 96.5 % (ref 95–98)
OXYHEMOGLOBIN: ABNORMAL % (ref 95–98)
PATHOLOGIST REVIEW: NORMAL
PATIENT TEMP: 37
PCO2 ARTERIAL: 63.9 MMHG (ref 35–45)
PCO2, ART, TEMP ADJ: ABNORMAL (ref 35–45)
PDW BLD-RTO: 17.6 % (ref 11.5–14.9)
PEEP/CPAP: ABNORMAL
PH ARTERIAL: 7.25 (ref 7.35–7.45)
PH, ART, TEMP ADJ: ABNORMAL (ref 7.35–7.45)
PLATELET # BLD: 236 K/UL (ref 150–450)
PMV BLD AUTO: 7.2 FL (ref 6–12)
PO2 ARTERIAL: 128 MMHG (ref 80–100)
PO2, ART, TEMP ADJ: ABNORMAL MMHG (ref 80–100)
POSITIVE BASE EXCESS, ART: 0.6 MMOL/L (ref 0–2)
POTASSIUM SERPL-SCNC: 4.9 MMOL/L (ref 3.7–5.3)
PSV: ABNORMAL
PT. POSITION: ABNORMAL
RBC # BLD: 2.37 M/UL (ref 4.5–5.9)
RESPIRATORY RATE: 23
SAMPLE SITE: ABNORMAL
SET RATE: ABNORMAL
SODIUM BLD-SCNC: 146 MMOL/L (ref 135–144)
TEXT FOR RESPIRATORY: ABNORMAL
TOTAL HB: ABNORMAL G/DL (ref 12–16)
TOTAL RATE: ABNORMAL
TROPONIN INTERP: NORMAL
TROPONIN T: <0.03 NG/ML
VITAMIN B-12: 571 PG/ML (ref 232–1245)
VT: ABNORMAL
WBC # BLD: 5.8 K/UL (ref 3.5–11)

## 2018-02-07 PROCEDURE — 36415 COLL VENOUS BLD VENIPUNCTURE: CPT

## 2018-02-07 PROCEDURE — 84155 ASSAY OF PROTEIN SERUM: CPT

## 2018-02-07 PROCEDURE — 36600 WITHDRAWAL OF ARTERIAL BLOOD: CPT

## 2018-02-07 PROCEDURE — 86038 ANTINUCLEAR ANTIBODIES: CPT

## 2018-02-07 PROCEDURE — 82947 ASSAY GLUCOSE BLOOD QUANT: CPT

## 2018-02-07 PROCEDURE — 84165 PROTEIN E-PHORESIS SERUM: CPT

## 2018-02-07 PROCEDURE — 94660 CPAP INITIATION&MGMT: CPT

## 2018-02-07 PROCEDURE — 6360000002 HC RX W HCPCS: Performed by: STUDENT IN AN ORGANIZED HEALTH CARE EDUCATION/TRAINING PROGRAM

## 2018-02-07 PROCEDURE — 85018 HEMOGLOBIN: CPT

## 2018-02-07 PROCEDURE — 36430 TRANSFUSION BLD/BLD COMPNT: CPT

## 2018-02-07 PROCEDURE — 6360000002 HC RX W HCPCS: Performed by: INTERNAL MEDICINE

## 2018-02-07 PROCEDURE — 87070 CULTURE OTHR SPECIMN AEROBIC: CPT

## 2018-02-07 PROCEDURE — 86803 HEPATITIS C AB TEST: CPT

## 2018-02-07 PROCEDURE — 6370000000 HC RX 637 (ALT 250 FOR IP): Performed by: NURSE PRACTITIONER

## 2018-02-07 PROCEDURE — 87340 HEPATITIS B SURFACE AG IA: CPT

## 2018-02-07 PROCEDURE — 84484 ASSAY OF TROPONIN QUANT: CPT

## 2018-02-07 PROCEDURE — 2060000000 HC ICU INTERMEDIATE R&B

## 2018-02-07 PROCEDURE — 99233 SBSQ HOSP IP/OBS HIGH 50: CPT | Performed by: INTERNAL MEDICINE

## 2018-02-07 PROCEDURE — 83516 IMMUNOASSAY NONANTIBODY: CPT

## 2018-02-07 PROCEDURE — 85014 HEMATOCRIT: CPT

## 2018-02-07 PROCEDURE — 85027 COMPLETE CBC AUTOMATED: CPT

## 2018-02-07 PROCEDURE — 6370000000 HC RX 637 (ALT 250 FOR IP): Performed by: STUDENT IN AN ORGANIZED HEALTH CARE EDUCATION/TRAINING PROGRAM

## 2018-02-07 PROCEDURE — 82805 BLOOD GASES W/O2 SATURATION: CPT

## 2018-02-07 PROCEDURE — 94762 N-INVAS EAR/PLS OXIMTRY CONT: CPT

## 2018-02-07 PROCEDURE — 6360000002 HC RX W HCPCS: Performed by: NURSE PRACTITIONER

## 2018-02-07 PROCEDURE — 2580000003 HC RX 258: Performed by: STUDENT IN AN ORGANIZED HEALTH CARE EDUCATION/TRAINING PROGRAM

## 2018-02-07 PROCEDURE — 80048 BASIC METABOLIC PNL TOTAL CA: CPT

## 2018-02-07 PROCEDURE — 86160 COMPLEMENT ANTIGEN: CPT

## 2018-02-07 PROCEDURE — P9016 RBC LEUKOCYTES REDUCED: HCPCS

## 2018-02-07 PROCEDURE — 87205 SMEAR GRAM STAIN: CPT

## 2018-02-07 PROCEDURE — 2580000003 HC RX 258: Performed by: NURSE PRACTITIONER

## 2018-02-07 PROCEDURE — 94640 AIRWAY INHALATION TREATMENT: CPT

## 2018-02-07 PROCEDURE — 86900 BLOOD TYPING SEROLOGIC ABO: CPT

## 2018-02-07 RX ORDER — METHYLPREDNISOLONE SODIUM SUCCINATE 40 MG/ML
40 INJECTION, POWDER, LYOPHILIZED, FOR SOLUTION INTRAMUSCULAR; INTRAVENOUS EVERY 6 HOURS
Status: DISCONTINUED | OUTPATIENT
Start: 2018-02-07 | End: 2018-02-08

## 2018-02-07 RX ORDER — FUROSEMIDE 10 MG/ML
40 INJECTION INTRAMUSCULAR; INTRAVENOUS DAILY
Status: DISCONTINUED | OUTPATIENT
Start: 2018-02-08 | End: 2018-02-10

## 2018-02-07 RX ORDER — FUROSEMIDE 10 MG/ML
40 INJECTION INTRAMUSCULAR; INTRAVENOUS ONCE
Status: COMPLETED | OUTPATIENT
Start: 2018-02-08 | End: 2018-02-08

## 2018-02-07 RX ORDER — HYDRALAZINE HYDROCHLORIDE 20 MG/ML
10 INJECTION INTRAMUSCULAR; INTRAVENOUS EVERY 6 HOURS PRN
Status: DISCONTINUED | OUTPATIENT
Start: 2018-02-07 | End: 2018-02-12

## 2018-02-07 RX ORDER — HYDRALAZINE HYDROCHLORIDE 25 MG/1
25 TABLET, FILM COATED ORAL 3 TIMES DAILY
Status: DISCONTINUED | OUTPATIENT
Start: 2018-02-07 | End: 2018-02-12

## 2018-02-07 RX ADMIN — Medication 2 PUFF: at 20:48

## 2018-02-07 RX ADMIN — IPRATROPIUM BROMIDE AND ALBUTEROL SULFATE 1 AMPULE: .5; 3 SOLUTION RESPIRATORY (INHALATION) at 07:48

## 2018-02-07 RX ADMIN — ESCITALOPRAM OXALATE 20 MG: 20 TABLET ORAL at 08:45

## 2018-02-07 RX ADMIN — Medication 10 ML: at 20:51

## 2018-02-07 RX ADMIN — Medication 10 ML: at 08:45

## 2018-02-07 RX ADMIN — IPRATROPIUM BROMIDE AND ALBUTEROL SULFATE 1 AMPULE: .5; 3 SOLUTION RESPIRATORY (INHALATION) at 11:12

## 2018-02-07 RX ADMIN — IPRATROPIUM BROMIDE AND ALBUTEROL SULFATE 1 AMPULE: .5; 3 SOLUTION RESPIRATORY (INHALATION) at 15:36

## 2018-02-07 RX ADMIN — INSULIN LISPRO 2 UNITS: 100 INJECTION, SOLUTION INTRAVENOUS; SUBCUTANEOUS at 08:52

## 2018-02-07 RX ADMIN — IPRATROPIUM BROMIDE AND ALBUTEROL SULFATE 1 AMPULE: .5; 3 SOLUTION RESPIRATORY (INHALATION) at 20:10

## 2018-02-07 RX ADMIN — FERROUS SULFATE TAB 325 MG (65 MG ELEMENTAL FE) 325 MG: 325 (65 FE) TAB at 08:45

## 2018-02-07 RX ADMIN — CARVEDILOL 6.25 MG: 6.25 TABLET, FILM COATED ORAL at 08:45

## 2018-02-07 RX ADMIN — MULTIPLE VITAMINS W/ MINERALS TAB 1 TABLET: TAB at 08:45

## 2018-02-07 RX ADMIN — OXYCODONE HYDROCHLORIDE AND ACETAMINOPHEN 1 TABLET: 5; 325 TABLET ORAL at 21:56

## 2018-02-07 RX ADMIN — CARVEDILOL 6.25 MG: 6.25 TABLET, FILM COATED ORAL at 17:37

## 2018-02-07 RX ADMIN — ASPIRIN 81 MG: 81 TABLET, COATED ORAL at 08:45

## 2018-02-07 RX ADMIN — SODIUM BICARBONATE 650 MG: 650 TABLET, ORALLY DISINTEGRATING ORAL at 08:44

## 2018-02-07 RX ADMIN — INSULIN LISPRO 8 UNITS: 100 INJECTION, SOLUTION INTRAVENOUS; SUBCUTANEOUS at 12:13

## 2018-02-07 RX ADMIN — METHYLPREDNISOLONE SODIUM SUCCINATE 40 MG: 40 INJECTION, POWDER, FOR SOLUTION INTRAMUSCULAR; INTRAVENOUS at 20:48

## 2018-02-07 RX ADMIN — ATORVASTATIN CALCIUM 80 MG: 80 TABLET, FILM COATED ORAL at 20:48

## 2018-02-07 RX ADMIN — HEPARIN SODIUM 5000 UNITS: 5000 INJECTION, SOLUTION INTRAVENOUS; SUBCUTANEOUS at 08:52

## 2018-02-07 RX ADMIN — OXYCODONE HYDROCHLORIDE AND ACETAMINOPHEN 1 TABLET: 5; 325 TABLET ORAL at 17:49

## 2018-02-07 RX ADMIN — INSULIN LISPRO 1 UNITS: 100 INJECTION, SOLUTION INTRAVENOUS; SUBCUTANEOUS at 20:56

## 2018-02-07 RX ADMIN — METHYLPREDNISOLONE SODIUM SUCCINATE 40 MG: 40 INJECTION, POWDER, FOR SOLUTION INTRAMUSCULAR; INTRAVENOUS at 15:08

## 2018-02-07 RX ADMIN — HYDRALAZINE HYDROCHLORIDE 25 MG: 25 TABLET, FILM COATED ORAL at 20:48

## 2018-02-07 RX ADMIN — Medication 2 PUFF: at 08:44

## 2018-02-07 RX ADMIN — METHYLPREDNISOLONE SODIUM SUCCINATE 60 MG: 125 INJECTION, POWDER, FOR SOLUTION INTRAMUSCULAR; INTRAVENOUS at 02:11

## 2018-02-07 RX ADMIN — METHYLPREDNISOLONE SODIUM SUCCINATE 60 MG: 125 INJECTION, POWDER, FOR SOLUTION INTRAMUSCULAR; INTRAVENOUS at 08:45

## 2018-02-07 RX ADMIN — HYDRALAZINE HYDROCHLORIDE 25 MG: 25 TABLET, FILM COATED ORAL at 08:45

## 2018-02-07 RX ADMIN — HEPARIN SODIUM 5000 UNITS: 5000 INJECTION, SOLUTION INTRAVENOUS; SUBCUTANEOUS at 20:56

## 2018-02-07 RX ADMIN — IRON SUCROSE 100 MG: 20 INJECTION, SOLUTION INTRAVENOUS at 20:48

## 2018-02-07 RX ADMIN — HYDRALAZINE HYDROCHLORIDE 25 MG: 25 TABLET, FILM COATED ORAL at 15:08

## 2018-02-07 RX ADMIN — FUROSEMIDE 40 MG: 10 INJECTION, SOLUTION INTRAVENOUS at 08:45

## 2018-02-07 RX ADMIN — TAMSULOSIN HYDROCHLORIDE 0.4 MG: 0.4 CAPSULE ORAL at 08:45

## 2018-02-07 RX ADMIN — OXYCODONE HYDROCHLORIDE 5 MG: 5 TABLET ORAL at 17:50

## 2018-02-07 RX ADMIN — SODIUM BICARBONATE 650 MG: 650 TABLET, ORALLY DISINTEGRATING ORAL at 20:50

## 2018-02-07 ASSESSMENT — ENCOUNTER SYMPTOMS
CONSTIPATION: 1
COUGH: 1
DIARRHEA: 0
WHEEZING: 1
SHORTNESS OF BREATH: 1
SPUTUM PRODUCTION: 1
NAUSEA: 0
ABDOMINAL PAIN: 1
VOMITING: 0

## 2018-02-07 ASSESSMENT — PAIN SCALES - GENERAL
PAINLEVEL_OUTOF10: 0
PAINLEVEL_OUTOF10: 7
PAINLEVEL_OUTOF10: 1
PAINLEVEL_OUTOF10: 8

## 2018-02-07 ASSESSMENT — PAIN DESCRIPTION - PAIN TYPE: TYPE: CHRONIC PAIN

## 2018-02-07 ASSESSMENT — PAIN DESCRIPTION - DESCRIPTORS: DESCRIPTORS: ACHING

## 2018-02-07 ASSESSMENT — PAIN DESCRIPTION - LOCATION: LOCATION: GENERALIZED

## 2018-02-07 NOTE — PROGRESS NOTES
Notified resident team of critical hgb of 6.9. No transfusion at this time d/t concern of volume overload. Will recheck in three hours.

## 2018-02-07 NOTE — PROGRESS NOTES
Spoke with Dr. Ranjan Cain on updated ABG's. Order received to have patient wear Bipap at all times while sleeping and for patient to be on Bipap 3-4 hours at a time with 1-2 hour intermittent breaks.

## 2018-02-07 NOTE — FLOWSHEET NOTE
02/06/18 2304   Provider Notification   Reason for Communication Critical Value (comment)   Provider Name Dr Julio Cesar Verdugo   Provider Notification Physician   Method of Communication Call   Response See orders   Notification Time [id number]   Notified of K+ 5.4 To repeat K+ in am. BMP already ordered

## 2018-02-07 NOTE — PROGRESS NOTES
1015 ABG's drawn and resulted.   Decreased NC to 2 l/m  Electronically signed by Elías lucas RCP on 2/7/2018 at 10:21 AM

## 2018-02-07 NOTE — CARE COORDINATION
ONGOING DISCHARGE PLAN:    Spoke with patient regarding discharge plan and patient confirms that plan is still home. Spoke with patient again regarding VNS, and he is agreeable. Freedom of choice provided, agency selected [address] Will notify Maranda from  when she rounds today. Pt remains on IV Lasix 40mg BID, IV solu-medrol 40mg every 6 hours. Per pulm, pt will need PET scan as outpatient. Will follow for order to set up. Will continue to follow for additional discharge needs.     Electronically signed by Deejay López RN on 2/7/2018 at 10:56 AM    Notified Maranda from  of new referral.    Electronically signed by Deejay López RN on 2/7/2018 at 2:25 PM

## 2018-02-07 NOTE — PROGRESS NOTES
distress  Cardiovascular - Heart sounds are normal.  Regular rate and rhythm   Abdomen - Soft, nontender, nondistended, bowel sound present  Neurologic - appropriate, following commands  Skin - No bruising or bleeding  Extremities - No clubbing, cyanosis, edema    MEDS      hydrALAZINE  25 mg Oral TID    sodium chloride flush  10 mL Intravenous 2 times per day    ipratropium-albuterol  1 ampule Inhalation Q4H WA    methylPREDNISolone  60 mg Intravenous Q6H    sodium chloride (PF)  10 mL Intravenous Daily    aspirin  81 mg Oral Daily    carvedilol  6.25 mg Oral BID WC    escitalopram  20 mg Oral Daily    mometasone-formoterol  2 puff Inhalation BID    tamsulosin  0.4 mg Oral Daily    therapeutic multivitamin-minerals  1 tablet Oral Daily    atorvastatin  80 mg Oral Nightly    ferrous sulfate  325 mg Oral BID    sodium bicarbonate  650 mg Oral BID    furosemide  40 mg Intravenous BID    heparin (porcine)  5,000 Units Subcutaneous 2 times per day    insulin lispro  0-12 Units Subcutaneous TID     insulin lispro  0-6 Units Subcutaneous Nightly    sodium chloride  250 mL Intravenous Once    sodium chloride flush  10 mL Intravenous Q12H      dextrose       hydrALAZINE, sodium chloride flush, acetaminophen, magnesium hydroxide, bisacodyl, ondansetron, nicotine, albuterol, diazepam, oxyCODONE-acetaminophen **AND** oxyCODONE, glucose, dextrose, glucagon (rDNA), dextrose, ipratropium-albuterol, sodium chloride flush    LABS   CBC   Recent Labs      02/07/18   0455   WBC  5.8   HGB  7.6*   HCT  23.9*   MCV  101.0*   PLT  236     BMP: Lab Results   Component Value Date     02/07/2018    K 4.9 02/07/2018     02/07/2018    CO2 27 02/07/2018    BUN 77 02/07/2018    LABALBU 2.7 02/06/2018    CREATININE 3.90 02/07/2018    CALCIUM 8.7 02/07/2018    GFRAA 19 02/07/2018    LABGLOM 15 02/07/2018     ABGs:  Lab Results   Component Value Date    PHART 7.229 02/06/2018    PO2ART 112.0 02/06/2018

## 2018-02-07 NOTE — PLAN OF CARE
Problem: Discharge Planning:  Goal: Discharged to appropriate level of care  Discharged to appropriate level of care   Outcome: Ongoing      Problem: Activity Intolerance:  Goal: Ability to tolerate increased activity will improve  Ability to tolerate increased activity will improve   Outcome: Met This Shift  Patient remains short of breath with exertion. Problem: Airway Clearance - Ineffective:  Goal: Ability to maintain a clear airway will improve  Ability to maintain a clear airway will improve   Outcome: Met This Shift  Airway remained clear. Problem: Breathing Pattern - Ineffective:  Goal: Ability to achieve and maintain a regular respiratory rate will improve  Ability to achieve and maintain a regular respiratory rate will improve   Outcome: Ongoing      Problem: Falls - Risk of  Goal: Absence of falls  Outcome: Met This Shift  Patient remained free from falls. Call light within reach.

## 2018-02-07 NOTE — PROGRESS NOTES
Constitutional:  Alert, awake, no apparent distress  Cardiovascular:  S1, S2 without m/r/g  Respiratory:  CTA B without w/r/r  Abdomen: +bs, soft, nt  Ext:  LE edema    Data/  Recent Labs      02/05/18   2110  02/06/18   0424   02/06/18   1810  02/07/18   0013  02/07/18   0455   WBC  6.8  4.9   --    --    --   5.8   HGB  7.0*  7.9*   < >  7.5*  7.5*  7.6*   HCT  21.6*  24.8*   < >  23.2*  23.9*  23.9*   MCV  105.7*  101.2*   --    --    --   101.0*   PLT  234  234   --    --    --   236    < > = values in this interval not displayed. Recent Labs      02/06/18   0840  02/06/18   1638  02/06/18 2110  02/07/18   0455   NA  142  140   --   146*   K  4.9  5.5*  5.4*  4.9   CL  104  103   --   107   CO2  24  24   --   27   GLUCOSE  157*  169*   --   144*   PHOS   --   6.6*   --    --    MG   --   2.2   --    --    BUN  76*  79*   --   77*   CREATININE  3.74*  3.86*   --   3.90*   LABGLOM  16*  16*   --   15*   GFRAA  20*  19*   --   19*         Assessment/   1. Acute kidney injury secondary to obstructive uropathy with significant urinary retention-creatinine worse today   renal USS shows multiple cysts-no calcification    2. Acute hyperkalemia secondary to decreased GFR.-resolved     3. SOB related to AECOPD/fluid overload/AcuteDecompensated CHF with elevated BNP/renal failure     4. CKD stage IV secondary to diabetic hypertensive nephropathy and element of obstructive uropathy     5. Anemia with multifactorial etiology     6 Cachexia with active smoking hx-r/o malignancy/multiple nodules on chest CT.     7. Hypernatremia    Plan/   1. Decrease IV lasix to  40 mg dly. 2. IV aranesp 40 mcg wkly  3.follow  serum and urine protein electrophoresis to r/o element of occult dysproteinemia. 4.echocardiogram  5.renal diet and 1500 mls fluid restriction  6 anemia work up. 7 pulmonology recommendations noted    No indication for dialysis.     Michel Winston M.D, [facility]  Nephrologist

## 2018-02-07 NOTE — PROGRESS NOTES
as uncontrolled ([facility] [address] and Vitamin D deficiency. Social History:   reports that he has been smoking Cigarettes. He has been smoking about 2.00 packs per day. He does not have any smokeless tobacco history on file. He reports that he does not drink alcohol or use drugs. Family History: No family history on file. Vitals:  BP (!) 151/64   Pulse 81   Temp 98.4 °F (36.9 °C) (Oral)   Resp 19   Ht 5' 8\" (1.727 m)   Wt 129 lb (58.5 kg)   SpO2 100%   BMI 19.61 kg/m²   Temp (24hrs), Av.1 °F (36.7 °C), Min:97.8 °F (36.6 °C), Max:98.4 °F (36.9 °C)    Recent Labs      18   0757   POCGLU  150*  158*       I/O (24Hr): Intake/Output Summary (Last 24 hours) at 18 0841  Last data filed at 18 0600   Gross per 24 hour   Intake              460 ml   Output             2550 ml   Net            -2090 ml       Labs:    @CBC@  basic metabolic panel    Lab Results   Component Value Date/Time    SPECIAL NOT REPORTED 2018 03:51 PM     No results found for: CULTURE    @[facility]@    Radiology:    Xr Chest Standard (2 Vw)    Result Date: 2018  EXAMINATION: TWO VIEWS OF THE CHEST 2018 8:56 pm COMPARISON: 2015 HISTORY: ORDERING SYSTEM PROVIDED HISTORY: shortness of breath TECHNOLOGIST PROVIDED HISTORY: Reason for exam:->shortness of breath Ordering Physician Provided Reason for Exam: shortness of breath Acuity: Acute Type of Exam: Initial FINDINGS: Right hilar fullness has increased. Small pleural effusions are suspected. Chronic interstitial lung changes are again seen. Cardiomegaly. No pulmonary edema. Diffuse osteopenia with thoracic spine degenerative changes. Small pleural effusions. Increased right hilar density may reflect underlying adenopathy. Follow-up with chest CT may be helpful.      Ct Chest Wo Contrast    Result Date: 2018  EXAMINATION: CT OF THE CHEST WITHOUT CONTRAST 2018 2:41 pm TECHNIQUE: CT of the chest was performed without the administration of intravenous contrast. Multiplanar reformatted images are provided for review. Dose modulation, iterative reconstruction, and/or weight based adjustment of the mA/kV was utilized to reduce the radiation dose to as low as reasonably achievable. COMPARISON: January 8, 2016 HISTORY: ORDERING SYSTEM PROVIDED HISTORY: ACUTE RESP ILLNESS, >36YEARS OLD TECHNOLOGIST PROVIDED HISTORY: Ordering Physician Provided Reason for Exam: acute respiratory failure Acuity: Unknown Type of Exam: Unknown FINDINGS: Evaluation is limited by motion. Mediastinum: Cardiomegaly. No pericardial effusion. Coronary artery calcifications are seen. No mediastinal or hilar adenopathy is seen within limitations of a noncontrast study. Atherosclerotic calcification of the thoracic aorta. Lungs/Pleura: Trace pleural effusions. No pneumothorax. Chronic interstitial lung changes and emphysematous changes are again seen. Scarring is seen at the lung apices. No focal consolidation. Bronchiectasis is seen within the lower lobes. Adjacent to a vessel within the left lower lobe there is a 4 mm nodule series 4, image 70, not definitely seen on the prior study. A 4 mm nodular opacity is seen within the left lower lobe series 4, image 86, not definitely seen on the prior study. Within the right upper lobe there is a 9 mm spiculated nodule suboptimally visualized due to motion at this level series 4, image 34. This is not clearly seen on the prior study. Spiculated nodules along the right fissure appear smaller than on the previous study. A 10 mm subpleural nodule is seen within the superior segment of the right lower lobe series 4, image 41, not clearly seen on the prior study. Spiculated right lower lobe nodule measuring 13 mm series 4, image 63 is not clearly seen on the previous study. Multiple nodular opacities are seen at the right lung base, increased from the previous study.   For example there is a subpleural nodule measuring 9 mm series 4, image 105. Upper Abdomen: Limited views of the upper abdomen demonstrate a large cyst within the left kidney. No adrenal nodule. Soft Tissues/Bones: Vascular stent is seen along the left chest wall. No axillary adenopathy. No acute osseous abnormality. Multiple pulmonary nodules not seen on the previous study are concerning for malignancy. PET scan may be helpful for additional evaluation. Us Renal Complete    Result Date: 2/6/2018  EXAMINATION: RETROPERITONEAL ULTRASOUND OF THE KIDNEYS AND URINARY BLADDER 2/6/2018 COMPARISON: May 17, 2010 HISTORY: ORDERING SYSTEM PROVIDED HISTORY: RENAL FAILURE, CHRONIC (KIDNEY DISEASE) TECHNOLOGIST PROVIDED HISTORY: Acuity: Acute Type of Exam: Initial FINDINGS: Kidneys: The right kidney measures 9.6 cm in length and the left kidney measures 10 cm in length. Abnormally increased echogenicity is noted. No hydronephrosis or stones identified. Multiple bilateral cysts are present measuring up to 5.3 cm in the lower pole right kidney. Bladder: The bladder is well distended. Echogenic kidneys, consistent with intrinsic renal parenchymal disease. Bilateral renal cysts are noted. Xr Chest Portable    Result Date: 2/6/2018  EXAMINATION: SINGLE VIEW OF THE CHEST 2/6/2018 6:52 am COMPARISON: 02/05/2018 HISTORY: ORDERING SYSTEM PROVIDED HISTORY: AECOPD TECHNOLOGIST PROVIDED HISTORY: Reason for exam:->AECOPD Acuity: Unknown Type of Exam: Unknown Additional signs and symptoms: Following COPD. FINDINGS: Stable mild cardiomegaly and mild pulmonary vascular congestion. Pulmonary emphysema with bilateral fibrotic changes. Bilateral apical pleural thickening. Emphysematous bulla in the right lung base. Bilateral prominent yinka consistent with enlargement of the central pulmonary arteries. Right suprahilar parenchymal consolidation extending into the right upper lobe, unchanged. Blunting of the costophrenic angle suggesting small effusions. No pneumothorax.

## 2018-02-08 ENCOUNTER — APPOINTMENT (OUTPATIENT)
Dept: GENERAL RADIOLOGY | Age: 68
DRG: 673 | End: 2018-02-08
Payer: COMMERCIAL

## 2018-02-08 ENCOUNTER — APPOINTMENT (OUTPATIENT)
Dept: CT IMAGING | Age: 68
DRG: 673 | End: 2018-02-08
Payer: COMMERCIAL

## 2018-02-08 PROBLEM — T50.905A DRUG-INDUCED HYPERGLYCEMIA: Status: ACTIVE | Noted: 2018-02-08

## 2018-02-08 PROBLEM — J96.22 ACUTE ON CHRONIC RESPIRATORY FAILURE WITH HYPERCAPNIA (HCC): Status: ACTIVE | Noted: 2018-02-08

## 2018-02-08 PROBLEM — R73.9 DRUG-INDUCED HYPERGLYCEMIA: Status: ACTIVE | Noted: 2018-02-08

## 2018-02-08 PROBLEM — I50.23 ACUTE ON CHRONIC SYSTOLIC HEART FAILURE (HCC): Status: ACTIVE | Noted: 2018-02-08

## 2018-02-08 LAB
ALLEN TEST: ABNORMAL
ANION GAP SERPL CALCULATED.3IONS-SCNC: 9 MMOL/L (ref 9–17)
ANTI-NUCLEAR ANTIBODY (ANA): NEGATIVE
BUN BLDV-MCNC: 83 MG/DL (ref 8–23)
BUN/CREAT BLD: ABNORMAL (ref 9–20)
CALCIUM SERPL-MCNC: 8.3 MG/DL (ref 8.6–10.4)
CARBOXYHEMOGLOBIN: 1.4 %
CHLORIDE BLD-SCNC: 102 MMOL/L (ref 98–107)
CO2: 29 MMOL/L (ref 20–31)
CREAT SERPL-MCNC: 3.87 MG/DL (ref 0.7–1.2)
FIO2: 30
GFR AFRICAN AMERICAN: 19 ML/MIN
GFR NON-AFRICAN AMERICAN: 16 ML/MIN
GFR SERPL CREATININE-BSD FRML MDRD: ABNORMAL ML/MIN/{1.73_M2}
GFR SERPL CREATININE-BSD FRML MDRD: ABNORMAL ML/MIN/{1.73_M2}
GLUCOSE BLD-MCNC: 115 MG/DL (ref 75–110)
GLUCOSE BLD-MCNC: 121 MG/DL (ref 70–99)
GLUCOSE BLD-MCNC: 174 MG/DL (ref 75–110)
GLUCOSE BLD-MCNC: 285 MG/DL (ref 75–110)
HCO3 ARTERIAL: 29.6 MMOL/L
HCT VFR BLD CALC: 24.1 % (ref 41–53)
HCT VFR BLD CALC: 24.2 % (ref 41–53)
HCT VFR BLD CALC: 25.8 % (ref 41–53)
HEMOGLOBIN: 8 G/DL (ref 13.5–17.5)
HEMOGLOBIN: 8.1 G/DL (ref 13.5–17.5)
HEMOGLOBIN: 8.5 G/DL (ref 13.5–17.5)
MCH RBC QN AUTO: 32.4 PG (ref 26–34)
MCHC RBC AUTO-ENTMCNC: 33.2 G/DL (ref 31–37)
MCV RBC AUTO: 97.7 FL (ref 80–100)
METHEMOGLOBIN: 1 %
MODE: ABNORMAL
NEGATIVE BASE EXCESS, ART: ABNORMAL MMOL/L (ref 0–2)
NOTIFICATION TIME: ABNORMAL
NOTIFICATION: ABNORMAL
NRBC AUTOMATED: ABNORMAL PER 100 WBC
O2 DEVICE/FLOW/%: ABNORMAL
O2 SAT, ARTERIAL: 92.7 %
OXYHEMOGLOBIN: ABNORMAL % (ref 95–98)
PATIENT TEMP: 37
PCO2 ARTERIAL: 62.1 MMHG
PCO2, ART, TEMP ADJ: ABNORMAL
PDW BLD-RTO: 18.5 % (ref 11.5–14.9)
PEEP/CPAP: ABNORMAL
PH ARTERIAL: 7.29
PH, ART, TEMP ADJ: ABNORMAL
PLATELET # BLD: 191 K/UL (ref 150–450)
PMV BLD AUTO: 7.3 FL (ref 6–12)
PO2 ARTERIAL: 77.6 MMHG
PO2, ART, TEMP ADJ: ABNORMAL MMHG
POSITIVE BASE EXCESS, ART: 3 MMOL/L (ref 0–2)
POTASSIUM SERPL-SCNC: 4.1 MMOL/L (ref 3.7–5.3)
PSV: ABNORMAL
PT. POSITION: ABNORMAL
RBC # BLD: 2.47 M/UL (ref 4.5–5.9)
RESPIRATORY RATE: 18
SAMPLE SITE: ABNORMAL
SET RATE: ABNORMAL
SODIUM BLD-SCNC: 140 MMOL/L (ref 135–144)
TEXT FOR RESPIRATORY: ABNORMAL
TOTAL HB: ABNORMAL G/DL (ref 12–16)
TOTAL RATE: ABNORMAL
VT: ABNORMAL
WBC # BLD: 6.9 K/UL (ref 3.5–11)

## 2018-02-08 PROCEDURE — 6360000002 HC RX W HCPCS: Performed by: INTERNAL MEDICINE

## 2018-02-08 PROCEDURE — 6370000000 HC RX 637 (ALT 250 FOR IP): Performed by: INTERNAL MEDICINE

## 2018-02-08 PROCEDURE — C9113 INJ PANTOPRAZOLE SODIUM, VIA: HCPCS | Performed by: STUDENT IN AN ORGANIZED HEALTH CARE EDUCATION/TRAINING PROGRAM

## 2018-02-08 PROCEDURE — 2580000003 HC RX 258: Performed by: STUDENT IN AN ORGANIZED HEALTH CARE EDUCATION/TRAINING PROGRAM

## 2018-02-08 PROCEDURE — 94660 CPAP INITIATION&MGMT: CPT

## 2018-02-08 PROCEDURE — 85014 HEMATOCRIT: CPT

## 2018-02-08 PROCEDURE — 2580000003 HC RX 258: Performed by: INTERNAL MEDICINE

## 2018-02-08 PROCEDURE — 82805 BLOOD GASES W/O2 SATURATION: CPT

## 2018-02-08 PROCEDURE — 6370000000 HC RX 637 (ALT 250 FOR IP): Performed by: NURSE PRACTITIONER

## 2018-02-08 PROCEDURE — 36415 COLL VENOUS BLD VENIPUNCTURE: CPT

## 2018-02-08 PROCEDURE — 80048 BASIC METABOLIC PNL TOTAL CA: CPT

## 2018-02-08 PROCEDURE — 6370000000 HC RX 637 (ALT 250 FOR IP): Performed by: STUDENT IN AN ORGANIZED HEALTH CARE EDUCATION/TRAINING PROGRAM

## 2018-02-08 PROCEDURE — 85018 HEMOGLOBIN: CPT

## 2018-02-08 PROCEDURE — 82947 ASSAY GLUCOSE BLOOD QUANT: CPT

## 2018-02-08 PROCEDURE — 71045 X-RAY EXAM CHEST 1 VIEW: CPT

## 2018-02-08 PROCEDURE — 2580000003 HC RX 258: Performed by: NURSE PRACTITIONER

## 2018-02-08 PROCEDURE — 85027 COMPLETE CBC AUTOMATED: CPT

## 2018-02-08 PROCEDURE — 6370000000 HC RX 637 (ALT 250 FOR IP): Performed by: FAMILY MEDICINE

## 2018-02-08 PROCEDURE — 94640 AIRWAY INHALATION TREATMENT: CPT

## 2018-02-08 PROCEDURE — 99222 1ST HOSP IP/OBS MODERATE 55: CPT | Performed by: INTERNAL MEDICINE

## 2018-02-08 PROCEDURE — 74176 CT ABD & PELVIS W/O CONTRAST: CPT

## 2018-02-08 PROCEDURE — 36600 WITHDRAWAL OF ARTERIAL BLOOD: CPT

## 2018-02-08 PROCEDURE — 94762 N-INVAS EAR/PLS OXIMTRY CONT: CPT

## 2018-02-08 PROCEDURE — 2060000000 HC ICU INTERMEDIATE R&B

## 2018-02-08 PROCEDURE — 99233 SBSQ HOSP IP/OBS HIGH 50: CPT | Performed by: INTERNAL MEDICINE

## 2018-02-08 PROCEDURE — 6360000002 HC RX W HCPCS: Performed by: STUDENT IN AN ORGANIZED HEALTH CARE EDUCATION/TRAINING PROGRAM

## 2018-02-08 RX ORDER — ISOSORBIDE MONONITRATE 30 MG/1
30 TABLET, EXTENDED RELEASE ORAL DAILY
Status: DISCONTINUED | OUTPATIENT
Start: 2018-02-08 | End: 2018-02-12

## 2018-02-08 RX ORDER — DOCUSATE SODIUM 100 MG/1
100 CAPSULE, LIQUID FILLED ORAL DAILY
Status: DISCONTINUED | OUTPATIENT
Start: 2018-02-08 | End: 2018-02-10

## 2018-02-08 RX ORDER — PREDNISONE 20 MG/1
20 TABLET ORAL 2 TIMES DAILY
Status: DISCONTINUED | OUTPATIENT
Start: 2018-02-08 | End: 2018-02-11

## 2018-02-08 RX ADMIN — Medication 2 PUFF: at 08:26

## 2018-02-08 RX ADMIN — PREDNISONE 20 MG: 20 TABLET ORAL at 20:43

## 2018-02-08 RX ADMIN — IPRATROPIUM BROMIDE AND ALBUTEROL SULFATE 1 AMPULE: .5; 3 SOLUTION RESPIRATORY (INHALATION) at 07:26

## 2018-02-08 RX ADMIN — PREDNISONE 20 MG: 20 TABLET ORAL at 13:03

## 2018-02-08 RX ADMIN — Medication 10 ML: at 15:09

## 2018-02-08 RX ADMIN — SODIUM BICARBONATE 650 MG: 650 TABLET, ORALLY DISINTEGRATING ORAL at 20:43

## 2018-02-08 RX ADMIN — INSULIN LISPRO 6 UNITS: 100 INJECTION, SOLUTION INTRAVENOUS; SUBCUTANEOUS at 13:04

## 2018-02-08 RX ADMIN — MULTIPLE VITAMINS W/ MINERALS TAB 1 TABLET: TAB at 08:28

## 2018-02-08 RX ADMIN — METHYLPREDNISOLONE SODIUM SUCCINATE 40 MG: 40 INJECTION, POWDER, FOR SOLUTION INTRAMUSCULAR; INTRAVENOUS at 01:40

## 2018-02-08 RX ADMIN — OXYCODONE HYDROCHLORIDE AND ACETAMINOPHEN 1 TABLET: 5; 325 TABLET ORAL at 04:38

## 2018-02-08 RX ADMIN — SODIUM BICARBONATE 650 MG: 650 TABLET, ORALLY DISINTEGRATING ORAL at 08:27

## 2018-02-08 RX ADMIN — DARBEPOETIN ALFA 25 MCG: 25 INJECTION, SOLUTION INTRAVENOUS; SUBCUTANEOUS at 08:30

## 2018-02-08 RX ADMIN — ASPIRIN 81 MG: 81 TABLET, COATED ORAL at 08:28

## 2018-02-08 RX ADMIN — ATORVASTATIN CALCIUM 80 MG: 80 TABLET, FILM COATED ORAL at 20:43

## 2018-02-08 RX ADMIN — OXYCODONE HYDROCHLORIDE AND ACETAMINOPHEN 1 TABLET: 5; 325 TABLET ORAL at 11:08

## 2018-02-08 RX ADMIN — FUROSEMIDE 40 MG: 10 INJECTION, SOLUTION INTRAVENOUS at 00:29

## 2018-02-08 RX ADMIN — ESCITALOPRAM OXALATE 20 MG: 20 TABLET ORAL at 08:28

## 2018-02-08 RX ADMIN — FUROSEMIDE 40 MG: 10 INJECTION, SOLUTION INTRAVENOUS at 08:27

## 2018-02-08 RX ADMIN — OXYCODONE HYDROCHLORIDE AND ACETAMINOPHEN 1 TABLET: 5; 325 TABLET ORAL at 15:08

## 2018-02-08 RX ADMIN — TAMSULOSIN HYDROCHLORIDE 0.4 MG: 0.4 CAPSULE ORAL at 08:27

## 2018-02-08 RX ADMIN — METHYLPREDNISOLONE SODIUM SUCCINATE 40 MG: 40 INJECTION, POWDER, FOR SOLUTION INTRAMUSCULAR; INTRAVENOUS at 08:28

## 2018-02-08 RX ADMIN — HYDRALAZINE HYDROCHLORIDE 25 MG: 25 TABLET, FILM COATED ORAL at 14:21

## 2018-02-08 RX ADMIN — DOCUSATE SODIUM 100 MG: 100 CAPSULE, LIQUID FILLED ORAL at 17:11

## 2018-02-08 RX ADMIN — Medication 10 ML: at 08:30

## 2018-02-08 RX ADMIN — ISOSORBIDE MONONITRATE 30 MG: 30 TABLET, EXTENDED RELEASE ORAL at 14:21

## 2018-02-08 RX ADMIN — SODIUM CHLORIDE 8 MG/HR: 9 INJECTION, SOLUTION INTRAVENOUS at 17:11

## 2018-02-08 RX ADMIN — IPRATROPIUM BROMIDE AND ALBUTEROL SULFATE 1 AMPULE: .5; 3 SOLUTION RESPIRATORY (INHALATION) at 15:44

## 2018-02-08 RX ADMIN — IPRATROPIUM BROMIDE AND ALBUTEROL SULFATE 1 AMPULE: .5; 3 SOLUTION RESPIRATORY (INHALATION) at 11:03

## 2018-02-08 RX ADMIN — CARVEDILOL 6.25 MG: 6.25 TABLET, FILM COATED ORAL at 17:11

## 2018-02-08 RX ADMIN — Medication 2 PUFF: at 20:45

## 2018-02-08 RX ADMIN — INSULIN LISPRO 1 UNITS: 100 INJECTION, SOLUTION INTRAVENOUS; SUBCUTANEOUS at 20:51

## 2018-02-08 RX ADMIN — CARVEDILOL 6.25 MG: 6.25 TABLET, FILM COATED ORAL at 08:28

## 2018-02-08 RX ADMIN — IRON SUCROSE 100 MG: 20 INJECTION, SOLUTION INTRAVENOUS at 13:03

## 2018-02-08 RX ADMIN — OXYCODONE HYDROCHLORIDE AND ACETAMINOPHEN 1 TABLET: 5; 325 TABLET ORAL at 19:41

## 2018-02-08 RX ADMIN — IPRATROPIUM BROMIDE AND ALBUTEROL SULFATE 1 AMPULE: .5; 3 SOLUTION RESPIRATORY (INHALATION) at 21:00

## 2018-02-08 RX ADMIN — HYDRALAZINE HYDROCHLORIDE 25 MG: 25 TABLET, FILM COATED ORAL at 08:28

## 2018-02-08 RX ADMIN — HYDRALAZINE HYDROCHLORIDE 25 MG: 25 TABLET, FILM COATED ORAL at 20:43

## 2018-02-08 ASSESSMENT — PAIN DESCRIPTION - DESCRIPTORS
DESCRIPTORS: ACHING

## 2018-02-08 ASSESSMENT — PAIN SCALES - GENERAL
PAINLEVEL_OUTOF10: 2
PAINLEVEL_OUTOF10: 6
PAINLEVEL_OUTOF10: 5
PAINLEVEL_OUTOF10: 3
PAINLEVEL_OUTOF10: 6
PAINLEVEL_OUTOF10: 4
PAINLEVEL_OUTOF10: 7
PAINLEVEL_OUTOF10: 7

## 2018-02-08 ASSESSMENT — ENCOUNTER SYMPTOMS
DIARRHEA: 0
ABDOMINAL PAIN: 0
SHORTNESS OF BREATH: 1
HEARTBURN: 0
NAUSEA: 0
SPUTUM PRODUCTION: 1
CONSTIPATION: 1
WHEEZING: 0
VOMITING: 0
COUGH: 1

## 2018-02-08 ASSESSMENT — PAIN DESCRIPTION - PAIN TYPE
TYPE: CHRONIC PAIN

## 2018-02-08 ASSESSMENT — PAIN DESCRIPTION - LOCATION
LOCATION: GENERALIZED

## 2018-02-08 NOTE — CONSULTS
02/08/18 0411   WBC  4.9   --   5.8   --    --    --   6.9   HGB  7.9*   < >  7.6*   < >  6.9*  6.8*  8.0*   HCT  24.8*   < >  23.9*   < >  21.7*  21.3*  24.2*   MCV  101.2*   --   101.0*   --    --    --   97.7   PLT  234   --   236   --    --    --   191    < > = values in this interval not displayed. BMP:   Recent Labs      02/06/18   1638  02/06/18   2110  02/07/18   0455  02/08/18 0411   NA  140   --   146*  140   K  5.5*  5.4*  4.9  4.1   CL  103   --   107  102   CO2  24   --   27  29   PHOS  6.6*   --    --    --    BUN  79*   --   77*  83*   CREATININE  3.86*   --   3.90*  3.87*     MAG:   Recent Labs      02/06/18   1638   MG  2.2       Recent Results (from the past 24 hour(s))   POC Glucose Fingerstick    Collection Time: 02/07/18  7:57 AM   Result Value Ref Range    POC Glucose 158 (H) 75 - 110 mg/dL   Arterial Blood Gases    Collection Time: 02/07/18 10:05 AM   Result Value Ref Range    pH, Arterial 7.248 (LL) 7.350 - 7.450    pCO2, Arterial 63.9 (HH) 35.0 - 45.0 mmHg    pO2, Arterial 128.0 (H) 80.0 - 100.0 mmHg    HCO3, Arterial 27.9 (H) 22.0 - 26.0 mmol/L    Positive Base Excess, Art 0.6 0.0 - 2.0 mmol/L    Negative Base Excess, Art NOT REPORTED 0.0 - 2.0 mmol/L    O2 Sat, Arterial 96.5 95 - 98 %    Total Hb NOT REPORTED 12.0 - 16.0 g/dl    Oxyhemoglobin NOT REPORTED 95.0 - 98.0 %    Carboxyhemoglobin 1.1 0 - 5 %    Methemoglobin 1.4 0.0 - 1.9 %    Pt Temp 37.0     pH, Art, Temp Adj NOT REPORTED 7.350 - 7.450    pCO2, Art, Temp Adj NOT REPORTED 35.0 - 45.0    pO2, Art, Temp Adj NOT REPORTED 80.0 - 100.0 mmHg    O2 Device/Flow/% Cannula     Respiratory Rate 23     Jamal Test PASS     Sample Site Right Radial Artery     Pt.  Position SEMI-FOWLERS     Mode NOT REPORTED     Set Rate NOT REPORTED     Total Rate NOT REPORTED     VT NOT REPORTED     FIO2 4LM     Peep/Cpap NOT REPORTED     PSV NOT REPORTED     Text for Respiratory NC 4L/M     NOTIFICATION NOT REPORTED     NOTIFICATION TIME NOT REPORTED Platelets 286 112 - 923 k/uL    MPV 7.3 6.0 - 12.0 fL    NRBC Automated NOT REPORTED per 100 WBC   Arterial Blood Gases    Collection Time: 02/08/18  5:14 AM   Result Value Ref Range    pH, Arterial 7.286     pCO2, Arterial 62.1 mmHg    pO2, Arterial 77.6 mmHg    HCO3, Arterial 29.6 mmol/L    Positive Base Excess, Art 3.0 (H) 0.0 - 2.0 mmol/L    Negative Base Excess, Art NOT REPORTED 0.0 - 2.0 mmol/L    O2 Sat, Arterial 92.7 %    Total Hb NOT REPORTED 12.0 - 16.0 g/dl    Oxyhemoglobin NOT REPORTED 95.0 - 98.0 %    Carboxyhemoglobin 1.4 %    Methemoglobin 1.0 %    Pt Temp 37.0     pH, Art, Temp Adj NOT REPORTED     pCO2, Art, Temp Adj NOT REPORTED     pO2, Art, Temp Adj NOT REPORTED mmHg    O2 Device/Flow/% BIPAP     Respiratory Rate 18     Jamal Test PASS     Sample Site Left Radial Artery     Pt. Position SUPINE     Mode BIPAP 12/6     Set Rate NOT REPORTED     Total Rate NOT REPORTED     VT NOT REPORTED     FIO2 30     Peep/Cpap NOT REPORTED     PSV NOT REPORTED     Text for Respiratory       RESULTS TO RN ERICA CARVALHO, BIPAP SETTINGS INCREASED TO 14/7    NOTIFICATION NOT REPORTED     NOTIFICATION TIME NOT REPORTED        IMPRESSION:    Acute on chronic heart failure with reduced LV ejection fraction with LVEF 20-25%, moderate mitral regurgitation, mild-to-moderate tricuspid regurgitation, moderate pulmonary hypertension with RVSP 55 mm of mercury on abnormal 2D echocardiogram done on 02/06/2018, suspected underlying atherosclerotic coronary artery disease is stable angina pectoris, Idiopathic cardiomyopathy - question ischemic, hypertension,chronic kidney disease with associated chronic anemia which is worsening his dyspnea, chronic tobacco abuse with associated COPD, severe peripheral vascular disease, cachexia, other medical problems as charted. Ruled out for myocardial infarction by serial troponin as charted. REC/PLAN:    As ordered.       Agree with the aggressive IV diuresis as ordered by nephrologist

## 2018-02-08 NOTE — PROGRESS NOTES
element of occult dysproteinemia. 4.renal diet and 1500 mls fluid restriction  5,GI eval for anemia. 6.Pt would require chronic hemodialysis as no improvement in renal function and at risk for uremia and further volume overload . 7. Urology consult for urinary retention    Patito Yanes M.D, [facility]  Nephrologist

## 2018-02-08 NOTE — PROGRESS NOTES
Cardiomegaly. No pulmonary edema. Diffuse osteopenia with thoracic spine degenerative changes. Small pleural effusions. Increased right hilar density may reflect underlying adenopathy. Follow-up with chest CT may be helpful. Ct Chest Wo Contrast    Result Date: 2/6/2018  EXAMINATION: CT OF THE CHEST WITHOUT CONTRAST 2/6/2018 2:41 pm TECHNIQUE: CT of the chest was performed without the administration of intravenous contrast. Multiplanar reformatted images are provided for review. Dose modulation, iterative reconstruction, and/or weight based adjustment of the mA/kV was utilized to reduce the radiation dose to as low as reasonably achievable. COMPARISON: January 8, 2016 HISTORY: ORDERING SYSTEM PROVIDED HISTORY: ACUTE RESP ILLNESS, >36YEARS OLD TECHNOLOGIST PROVIDED HISTORY: Ordering Physician Provided Reason for Exam: acute respiratory failure Acuity: Unknown Type of Exam: Unknown FINDINGS: Evaluation is limited by motion. Mediastinum: Cardiomegaly. No pericardial effusion. Coronary artery calcifications are seen. No mediastinal or hilar adenopathy is seen within limitations of a noncontrast study. Atherosclerotic calcification of the thoracic aorta. Lungs/Pleura: Trace pleural effusions. No pneumothorax. Chronic interstitial lung changes and emphysematous changes are again seen. Scarring is seen at the lung apices. No focal consolidation. Bronchiectasis is seen within the lower lobes. Adjacent to a vessel within the left lower lobe there is a 4 mm nodule series 4, image 70, not definitely seen on the prior study. A 4 mm nodular opacity is seen within the left lower lobe series 4, image 86, not definitely seen on the prior study. Within the right upper lobe there is a 9 mm spiculated nodule suboptimally visualized due to motion at this level series 4, image 34. This is not clearly seen on the prior study. Spiculated nodules along the right fissure appear smaller than on the previous study.  A 10 mm hypercapnia   - Pulmonology on board   - Repeat AB.29/62.1/77.6/29.6/3.0 on FiO2 30% BiPAP   - IV solumedrol 60 mg Q6   - Juan Carlos Byrd BiPAP      2. Systolic Heart Failure 2/2 Fluid Overload (EF 25%)   - Cardiology consulted   - 1500 mL Fluid Restrict   - IV Lasix 40 mg QD     3. CKD, baseline Cr 2.90   - Nephrology on board   - Fluid restrict 1500 mL   - Venofer 100 mg Q12 for 2 doses   - IV lasix 40mg QD   - Likely prerenal 2/2 anemia s/p 1 U PRBCs     4. Blood loss Anemia & Chronic Disease, s/p 2 U PRBCs   - GI Consult    - Reticulocyte count 2.1%    - H&H Q8h monitor   -  Hemoccult blood Negative x1, repeat     5.  Hyperglycemia 2/2 steroids   - POCT Glucose    - ISS low dose    Rashad Arnold MD  2018  8:25 AM   ,ar1

## 2018-02-08 NOTE — SIGNIFICANT EVENT
work up.     Overall POOR prognosis. Patient and wife Arpan Vega understands. Thank you.  _______________________  Sulma Whitehead  No orders of the defined types were placed in this encounter.     _______________________  FOLLOW UP  Return in about 6 months (around 2/28/2018).    PCP: Cachorro Evans MD  Referring Physician: Hawa Solares MD  [address] [id number]  [place], [place]  79/63/67 [id number]     Jolene Dan MD, [facility]           HISTORICAL DATA ONLY - MOST RECENT OFFICE VISIT NOTE DATED  08/28/2017 IN OUR OFFICE EHR - RKA 2/7/2018.

## 2018-02-08 NOTE — PROGRESS NOTES
Pulmonary Progress Note  Pulmonary and Critical Care Specialists      Patient - Radha Johnson,  Age - 75 y.o.    - 1950      Room Number -    MRN -  [id number]   [facility]  Date of Admission -  2018  7:29 PM    Follow-up: Acute exacerbation of COPD    Consulting Maria Isabel Byrne MD  Primary Care Physician - Ana Boone MD     SUBJECTIVE   better , denies any fever or chills  no chest tightness, Not much short of breath ,wheezing or cough  No nausea vomiting , no BM  On 3 L nasal cannula    OBJECTIVE   VITALS    height is 5' 8\" (1.727 m) and weight is 129 lb (58.5 kg). His axillary temperature is 97.6 °F (36.4 °C). His blood pressure is 161/93 (abnormal) and his pulse is 60. His respiration is 20 and oxygen saturation is 100%. Body mass index is 19.61 kg/m². Temperature Range: Temp: 97.6 °F (36.4 °C) Temp  Av.2 °F (36.8 °C)  Min: 97.6 °F (36.4 °C)  Max: 98.6 °F (37 °C)  BP Range:  Systolic (06DEU), NLH:400 , Min:143 , NPL:659     Diastolic (61OSA), LMK:28, Min:52, Max:93    Pulse Range: Pulse  Av.4  Min: 59  Max: 82  Respiration Range: Resp  Av  Min: 13  Max: 25  Current Pulse Ox[de-identified]  SpO2: 100 %  24HR Pulse Ox Range:  SpO2  Av.9 %  Min: 96 %  Max: 100 %  Oxygen Amount and Delivery: O2 Flow Rate (L/min): 2 L/min    Wt Readings from Last 3 Encounters:   18 129 lb (58.5 kg)   16 133 lb (60.3 kg)   11/23/15 134 lb 3.2 oz (60.9 kg)       I/O (24 Hours)    Intake/Output Summary (Last 24 hours) at 18 1049  Last data filed at 18 0600   Gross per 24 hour   Intake           894.58 ml   Output             2025 ml   Net         -1130.42 ml       EXAM     General Appearance  Awake, alert, in no acute distress  HEENT - normocephalic, atraumatic.      Neck - Supple,  trachea midline , No JVD  Lungs - coarse rhonchi, No wheezing, no distress  Cardiovascular - Heart sounds are normal. PO2ART 77.6 02/08/2018    IAX4TNU 62.1 02/08/2018      Lab Results   Component Value Date    MODE BIPAP 12/6 02/08/2018     Ionized Calcium:  No results found for: IONCA  Magnesium:    Lab Results   Component Value Date    MG 2.2 02/06/2018      Phosphorus:    Lab Results   Component Value Date    PHOS 6.6 02/06/2018        LIVER PROFILE   Recent Labs      02/06/18   1638   AST  13   ALT  14   BILIDIR  <0.08   BILITOT  <0.15*   ALKPHOS  57     INR No results for input(s): INR in the last 72 hours. PTT No results for input(s): APTT in the last 72 hours. BNP No results for input(s): BNP in the last 72 hours. RADIOLOGY   Mild congestion, and chronic changes  (See actual reports for details)    ASSESSMENT/PLAN     1. Acute exacerbation of COPD, possibly secondary to CHF. 2.  Bronchospasm/Better. 3.  Chronic respiratory failure with hypoxia, on home O2 at 3 liters. 4.  Hypervolemia. 5.   bilateral lung nodules on CT chest   6. Anemia, acute on chronic. 7.  Chronic kidney disease. 8.  Diabetes. 9.  Tobacco abuse up to 2 packs per day for 40 years, quit 3 weeks ago. Denies any craving.   Respiratory acidosis/required BiPAP     PLAN OF TREATMENT:   Continue with aerosol treatment, Acapella  Changed to by mouth steroid.,  Follow-up sputum cultures    Decrease O2, diuresis.    Anemia workup as per, CT abdomen order  Needs PET scan as an outpatient  Follow-up ABGs, continue BiPAP      Electronically signed by Anselmo Hatfield MD on 2/8/2018 at 10:49 AM

## 2018-02-08 NOTE — PROGRESS NOTES
Pt to CT for abd/pelvis. Pt tolerated well. Pt returned to room by writer and transporter Reny Weiss.     Electronically signed by Pavan Lobo RN on 2/8/2018 at 11:33 AM

## 2018-02-08 NOTE — CARE COORDINATION
[address] Transitions Interview     2018    Patient: Tata Cordova Patient : 1950   MRN: [id number]  Reason for Admission: There are no discharge diagnoses documented for the most recent discharge. RARS: [facility] Risk Score: 23       Spoke with: Rachel Trevizo      Readmission Risk  Patient Active Problem List   Diagnosis    Type 2 diabetes mellitus without complication, with long-term current use of insulin (HCC)    Hyperlipidemia    COPD (chronic obstructive pulmonary disease) (HCC)    Iron deficiency anemia    Cigarette smoker    Abdominal pain, chronic, generalized    Hernia, ventral    DDD (degenerative disc disease), lumbar    Cervicodynia    PUD (peptic ulcer disease)    Chronic kidney disease    Depression    Vitamin D deficiency    Lichen simplex chronicus    Hyperkalemia    Family history of cancer    COPD exacerbation ([address]    Abdominal aortic aneurysm (AAA) ([address]    Acute on chronic respiratory failure with hypercapnia ([address]    Acute on chronic systolic heart failure ([address]    Drug-induced hyperglycemia       Inpatient Assessment  Care Transitions Summary    Care Transitions Inpatient Review  Medication Review  Housing Review  Who do you live with?:  Partner/Spouse/SO  Social Support  Durable Medical Equipment  Patient DME:  Wheelchair, Other  Other Patient DME:  ramp  Patient Home Equipment:  Oxygen, Nebulizer  Functional Review  Ability to seek help/take action for Emergent/Urgent situations i.e. fire, crime, inclement weather or health crisis. :  Independent  Ability handle personal hygiene needs (bathing/dressing/grooming): Independent  Ability to manage medications: Independent  Hearing and Vision  Care Transitions Interventions     Plan is home with vns/OL, will follow    Follow Up  No future appointments.     Health Maintenance  Health Maintenance Due   Topic Date Due    Diabetic microalbuminuria test  1968    A1C test (Diabetic or Prediabetic)

## 2018-02-08 NOTE — PLAN OF CARE
Problem: Falls - Risk of  Goal: Absence of falls  Outcome: Ongoing  No attempt to get oob. Safe environment provided. Bed down and locked. Uses call light appropriately    Problem: Pain:  Goal: Control of chronic pain  Control of chronic pain   Outcome: Ongoing  Pt c/o generalized pain and was medicated times 2. Repositioned for comfort as needed.  Restful environment provided

## 2018-02-08 NOTE — PLAN OF CARE
Problem: Activity Intolerance:  Goal: Ability to tolerate increased activity will improve  Ability to tolerate increased activity will improve   Outcome: Ongoing      Problem: Airway Clearance - Ineffective:  Goal: Ability to maintain a clear airway will improve  Ability to maintain a clear airway will improve   Outcome: Ongoing      Problem: Breathing Pattern - Ineffective:  Goal: Ability to achieve and maintain a regular respiratory rate will improve  Ability to achieve and maintain a regular respiratory rate will improve   Outcome: Ongoing      Problem: Gas Exchange - Impaired:  Goal: Levels of oxygenation will improve  Levels of oxygenation will improve   Outcome: Ongoing      Problem: Falls - Risk of  Goal: Absence of falls  Outcome: Met This Shift  No falls this shift, fall precautions in place.     Problem: Pain:  Goal: Pain level will decrease  Pain level will decrease   Outcome: Ongoing    Goal: Control of chronic pain  Control of chronic pain   Outcome: Ongoing

## 2018-02-08 NOTE — FLOWSHEET NOTE
02/08/18 1304   Encounter Summary   Services provided to: Patient   Referral/Consult From: Rounding   Complexity of Encounter Low   Length of Encounter 15 minutes   Spiritual/Uatsdin   Type Spiritual support   Assessment Sleeping   Intervention Prayer   Outcome Did not respond

## 2018-02-09 LAB
ABO/RH: NORMAL
ALBUMIN (CALCULATED): 3 G/DL (ref 3.2–5.2)
ALBUMIN PERCENT: 50 % (ref 45–65)
ALLEN TEST: ABNORMAL
ALPHA 1 PERCENT: 3 % (ref 3–6)
ALPHA 2 PERCENT: 12 % (ref 6–13)
ALPHA-1-GLOBULIN: 0.2 G/DL (ref 0.1–0.4)
ALPHA-2-GLOBULIN: 0.7 G/DL (ref 0.5–0.9)
ANCA MYELOPEROXIDASE: 8 AU/ML
ANCA PROTEINASE 3: 3 AU/ML
ANION GAP SERPL CALCULATED.3IONS-SCNC: 12 MMOL/L (ref 9–17)
ANTIBODY SCREEN: NEGATIVE
ARM BAND NUMBER: NORMAL
BETA GLOBULIN: 0.7 G/DL (ref 0.5–1.1)
BETA PERCENT: 12 % (ref 11–19)
BLD PROD TYP BPU: NORMAL
BUN BLDV-MCNC: 87 MG/DL (ref 8–23)
BUN/CREAT BLD: ABNORMAL (ref 9–20)
CALCIUM SERPL-MCNC: 8.3 MG/DL (ref 8.6–10.4)
CARBOXYHEMOGLOBIN: 1 %
CHLORIDE BLD-SCNC: 97 MMOL/L (ref 98–107)
CO2: 27 MMOL/L (ref 20–31)
CREAT SERPL-MCNC: 3.94 MG/DL (ref 0.7–1.2)
CROSSMATCH RESULT: NORMAL
CULTURE: NORMAL
CULTURE: NORMAL
DIRECT EXAM: NORMAL
DISPENSE STATUS BLOOD BANK: NORMAL
EXPIRATION DATE: NORMAL
FIO2: ABNORMAL
GAMMA GLOBULIN %: 23 % (ref 9–20)
GAMMA GLOBULIN: 1.4 G/DL (ref 0.5–1.5)
GFR AFRICAN AMERICAN: 19 ML/MIN
GFR NON-AFRICAN AMERICAN: 15 ML/MIN
GFR SERPL CREATININE-BSD FRML MDRD: ABNORMAL ML/MIN/{1.73_M2}
GFR SERPL CREATININE-BSD FRML MDRD: ABNORMAL ML/MIN/{1.73_M2}
GLUCOSE BLD-MCNC: 101 MG/DL (ref 75–110)
GLUCOSE BLD-MCNC: 110 MG/DL (ref 70–99)
GLUCOSE BLD-MCNC: 119 MG/DL (ref 75–110)
GLUCOSE BLD-MCNC: 171 MG/DL (ref 75–110)
GLUCOSE BLD-MCNC: 99 MG/DL (ref 75–110)
HCO3 ARTERIAL: 30.2 MMOL/L
HCT VFR BLD CALC: 25.6 % (ref 41–53)
HCT VFR BLD CALC: 26 % (ref 41–53)
HCT VFR BLD CALC: 26.5 % (ref 41–53)
HCT VFR BLD CALC: 27.6 % (ref 41–53)
HEMOGLOBIN: 8.3 G/DL (ref 13.5–17.5)
HEMOGLOBIN: 8.5 G/DL (ref 13.5–17.5)
HEMOGLOBIN: 8.6 G/DL (ref 13.5–17.5)
HEMOGLOBIN: 9 G/DL (ref 13.5–17.5)
Lab: NORMAL
MCH RBC QN AUTO: 32.4 PG (ref 26–34)
MCHC RBC AUTO-ENTMCNC: 32.5 G/DL (ref 31–37)
MCV RBC AUTO: 99.7 FL (ref 80–100)
METHEMOGLOBIN: 0.9 %
MODE: ABNORMAL
NEGATIVE BASE EXCESS, ART: ABNORMAL MMOL/L (ref 0–2)
NOTIFICATION TIME: ABNORMAL
NOTIFICATION: ABNORMAL
NRBC AUTOMATED: ABNORMAL PER 100 WBC
O2 DEVICE/FLOW/%: ABNORMAL
O2 SAT, ARTERIAL: 89.8 %
OXYHEMOGLOBIN: ABNORMAL % (ref 95–98)
PATHOLOGIST: ABNORMAL
PATIENT TEMP: 37
PCO2 ARTERIAL: 66.5 MMHG
PCO2, ART, TEMP ADJ: ABNORMAL
PDW BLD-RTO: 18.6 % (ref 11.5–14.9)
PEEP/CPAP: ABNORMAL
PH ARTERIAL: 7.27
PH, ART, TEMP ADJ: ABNORMAL
PLATELET # BLD: 183 K/UL (ref 150–450)
PMV BLD AUTO: 7.5 FL (ref 6–12)
PO2 ARTERIAL: 65.9 MMHG
PO2, ART, TEMP ADJ: ABNORMAL MMHG
POSITIVE BASE EXCESS, ART: 3.2 MMOL/L (ref 0–2)
POTASSIUM SERPL-SCNC: 3.7 MMOL/L (ref 3.7–5.3)
PROTEIN ELECTROPHORESIS, SERUM: ABNORMAL
PSV: ABNORMAL
PT. POSITION: ABNORMAL
RBC # BLD: 2.66 M/UL (ref 4.5–5.9)
RESPIRATORY RATE: 19
SAMPLE SITE: ABNORMAL
SET RATE: ABNORMAL
SODIUM BLD-SCNC: 136 MMOL/L (ref 135–144)
SPECIMEN DESCRIPTION: NORMAL
SPECIMEN DESCRIPTION: NORMAL
STATUS: NORMAL
TEXT FOR RESPIRATORY: ABNORMAL
TOTAL HB: ABNORMAL G/DL (ref 12–16)
TOTAL PROT. SUM,%: 100 % (ref 98–102)
TOTAL PROT. SUM: 6 G/DL (ref 6.3–8.2)
TOTAL PROTEIN: 6 G/DL (ref 6.4–8.3)
TOTAL RATE: ABNORMAL
TRANSFUSION STATUS: NORMAL
UNIT DIVISION: 0
UNIT NUMBER: NORMAL
VT: ABNORMAL
WBC # BLD: 6 K/UL (ref 3.5–11)

## 2018-02-09 PROCEDURE — 36415 COLL VENOUS BLD VENIPUNCTURE: CPT

## 2018-02-09 PROCEDURE — 2580000003 HC RX 258: Performed by: EMERGENCY MEDICINE

## 2018-02-09 PROCEDURE — 6370000000 HC RX 637 (ALT 250 FOR IP): Performed by: INTERNAL MEDICINE

## 2018-02-09 PROCEDURE — 6370000000 HC RX 637 (ALT 250 FOR IP): Performed by: FAMILY MEDICINE

## 2018-02-09 PROCEDURE — 94640 AIRWAY INHALATION TREATMENT: CPT

## 2018-02-09 PROCEDURE — 94660 CPAP INITIATION&MGMT: CPT

## 2018-02-09 PROCEDURE — C9113 INJ PANTOPRAZOLE SODIUM, VIA: HCPCS | Performed by: STUDENT IN AN ORGANIZED HEALTH CARE EDUCATION/TRAINING PROGRAM

## 2018-02-09 PROCEDURE — 82947 ASSAY GLUCOSE BLOOD QUANT: CPT

## 2018-02-09 PROCEDURE — 2580000003 HC RX 258: Performed by: STUDENT IN AN ORGANIZED HEALTH CARE EDUCATION/TRAINING PROGRAM

## 2018-02-09 PROCEDURE — 99233 SBSQ HOSP IP/OBS HIGH 50: CPT | Performed by: INTERNAL MEDICINE

## 2018-02-09 PROCEDURE — 36600 WITHDRAWAL OF ARTERIAL BLOOD: CPT

## 2018-02-09 PROCEDURE — 6370000000 HC RX 637 (ALT 250 FOR IP): Performed by: STUDENT IN AN ORGANIZED HEALTH CARE EDUCATION/TRAINING PROGRAM

## 2018-02-09 PROCEDURE — 6360000002 HC RX W HCPCS: Performed by: STUDENT IN AN ORGANIZED HEALTH CARE EDUCATION/TRAINING PROGRAM

## 2018-02-09 PROCEDURE — 80048 BASIC METABOLIC PNL TOTAL CA: CPT

## 2018-02-09 PROCEDURE — 99232 SBSQ HOSP IP/OBS MODERATE 35: CPT | Performed by: INTERNAL MEDICINE

## 2018-02-09 PROCEDURE — 2580000003 HC RX 258: Performed by: NURSE PRACTITIONER

## 2018-02-09 PROCEDURE — 85027 COMPLETE CBC AUTOMATED: CPT

## 2018-02-09 PROCEDURE — 82805 BLOOD GASES W/O2 SATURATION: CPT

## 2018-02-09 PROCEDURE — 6370000000 HC RX 637 (ALT 250 FOR IP): Performed by: NURSE PRACTITIONER

## 2018-02-09 PROCEDURE — 2580000003 HC RX 258: Performed by: INTERNAL MEDICINE

## 2018-02-09 PROCEDURE — 85018 HEMOGLOBIN: CPT

## 2018-02-09 PROCEDURE — 6360000002 HC RX W HCPCS: Performed by: INTERNAL MEDICINE

## 2018-02-09 PROCEDURE — 94762 N-INVAS EAR/PLS OXIMTRY CONT: CPT

## 2018-02-09 PROCEDURE — 85014 HEMATOCRIT: CPT

## 2018-02-09 PROCEDURE — 2060000000 HC ICU INTERMEDIATE R&B

## 2018-02-09 RX ADMIN — IPRATROPIUM BROMIDE AND ALBUTEROL SULFATE 1 AMPULE: .5; 3 SOLUTION RESPIRATORY (INHALATION) at 11:40

## 2018-02-09 RX ADMIN — FUROSEMIDE 40 MG: 10 INJECTION, SOLUTION INTRAVENOUS at 09:30

## 2018-02-09 RX ADMIN — PREDNISONE 20 MG: 20 TABLET ORAL at 21:01

## 2018-02-09 RX ADMIN — HYDRALAZINE HYDROCHLORIDE 25 MG: 25 TABLET, FILM COATED ORAL at 21:03

## 2018-02-09 RX ADMIN — MAGNESIUM HYDROXIDE 30 ML: 400 SUSPENSION ORAL at 10:02

## 2018-02-09 RX ADMIN — Medication 10 ML: at 21:01

## 2018-02-09 RX ADMIN — PREDNISONE 20 MG: 20 TABLET ORAL at 09:30

## 2018-02-09 RX ADMIN — TAMSULOSIN HYDROCHLORIDE 0.4 MG: 0.4 CAPSULE ORAL at 09:30

## 2018-02-09 RX ADMIN — IPRATROPIUM BROMIDE AND ALBUTEROL SULFATE 1 AMPULE: .5; 3 SOLUTION RESPIRATORY (INHALATION) at 19:29

## 2018-02-09 RX ADMIN — HYDRALAZINE HYDROCHLORIDE 10 MG: 20 INJECTION INTRAMUSCULAR; INTRAVENOUS at 02:25

## 2018-02-09 RX ADMIN — CARVEDILOL 6.25 MG: 6.25 TABLET, FILM COATED ORAL at 09:31

## 2018-02-09 RX ADMIN — ISOSORBIDE MONONITRATE 30 MG: 30 TABLET, EXTENDED RELEASE ORAL at 09:30

## 2018-02-09 RX ADMIN — HYDRALAZINE HYDROCHLORIDE 25 MG: 25 TABLET, FILM COATED ORAL at 14:19

## 2018-02-09 RX ADMIN — IRON SUCROSE 100 MG: 20 INJECTION, SOLUTION INTRAVENOUS at 09:31

## 2018-02-09 RX ADMIN — HYDRALAZINE HYDROCHLORIDE 25 MG: 25 TABLET, FILM COATED ORAL at 10:02

## 2018-02-09 RX ADMIN — Medication 2 PUFF: at 09:30

## 2018-02-09 RX ADMIN — MULTIPLE VITAMINS W/ MINERALS TAB 1 TABLET: TAB at 09:30

## 2018-02-09 RX ADMIN — SODIUM CHLORIDE 8 MG/HR: 9 INJECTION, SOLUTION INTRAVENOUS at 22:47

## 2018-02-09 RX ADMIN — OXYCODONE HYDROCHLORIDE 5 MG: 5 TABLET ORAL at 17:42

## 2018-02-09 RX ADMIN — OXYCODONE HYDROCHLORIDE AND ACETAMINOPHEN 1 TABLET: 5; 325 TABLET ORAL at 06:40

## 2018-02-09 RX ADMIN — IPRATROPIUM BROMIDE AND ALBUTEROL SULFATE 1 AMPULE: .5; 3 SOLUTION RESPIRATORY (INHALATION) at 07:50

## 2018-02-09 RX ADMIN — SODIUM BICARBONATE 650 MG: 650 TABLET, ORALLY DISINTEGRATING ORAL at 21:01

## 2018-02-09 RX ADMIN — Medication 2 PUFF: at 21:03

## 2018-02-09 RX ADMIN — SODIUM BICARBONATE 650 MG: 650 TABLET, ORALLY DISINTEGRATING ORAL at 09:30

## 2018-02-09 RX ADMIN — SODIUM CHLORIDE 8 MG/HR: 9 INJECTION, SOLUTION INTRAVENOUS at 13:01

## 2018-02-09 RX ADMIN — OXYCODONE HYDROCHLORIDE AND ACETAMINOPHEN 1 TABLET: 5; 325 TABLET ORAL at 12:05

## 2018-02-09 RX ADMIN — Medication 10 ML: at 09:32

## 2018-02-09 RX ADMIN — ATORVASTATIN CALCIUM 80 MG: 80 TABLET, FILM COATED ORAL at 21:01

## 2018-02-09 RX ADMIN — Medication 10 ML: at 09:31

## 2018-02-09 RX ADMIN — SODIUM CHLORIDE 8 MG/HR: 9 INJECTION, SOLUTION INTRAVENOUS at 01:54

## 2018-02-09 RX ADMIN — DOCUSATE SODIUM 100 MG: 100 CAPSULE, LIQUID FILLED ORAL at 09:30

## 2018-02-09 RX ADMIN — INSULIN LISPRO 2 UNITS: 100 INJECTION, SOLUTION INTRAVENOUS; SUBCUTANEOUS at 12:10

## 2018-02-09 RX ADMIN — ESCITALOPRAM OXALATE 20 MG: 20 TABLET ORAL at 09:30

## 2018-02-09 RX ADMIN — CARVEDILOL 6.25 MG: 6.25 TABLET, FILM COATED ORAL at 17:36

## 2018-02-09 RX ADMIN — OXYCODONE HYDROCHLORIDE AND ACETAMINOPHEN 1 TABLET: 5; 325 TABLET ORAL at 00:55

## 2018-02-09 ASSESSMENT — PAIN SCALES - GENERAL
PAINLEVEL_OUTOF10: 0
PAINLEVEL_OUTOF10: 0
PAINLEVEL_OUTOF10: 7
PAINLEVEL_OUTOF10: 1
PAINLEVEL_OUTOF10: 3
PAINLEVEL_OUTOF10: 7
PAINLEVEL_OUTOF10: 7
PAINLEVEL_OUTOF10: 5
PAINLEVEL_OUTOF10: 7
PAINLEVEL_OUTOF10: 2
PAINLEVEL_OUTOF10: 0
PAINLEVEL_OUTOF10: 2

## 2018-02-09 ASSESSMENT — ENCOUNTER SYMPTOMS
CONSTIPATION: 1
VOMITING: 0
COUGH: 0
DIARRHEA: 0
ABDOMINAL PAIN: 1
SHORTNESS OF BREATH: 0
NAUSEA: 0

## 2018-02-09 ASSESSMENT — PAIN DESCRIPTION - LOCATION
LOCATION: GENERALIZED

## 2018-02-09 ASSESSMENT — PAIN DESCRIPTION - PAIN TYPE
TYPE: CHRONIC PAIN

## 2018-02-09 ASSESSMENT — PAIN DESCRIPTION - DESCRIPTORS
DESCRIPTORS: ACHING

## 2018-02-09 ASSESSMENT — PAIN DESCRIPTION - ONSET: ONSET: ON-GOING

## 2018-02-09 ASSESSMENT — PAIN DESCRIPTION - FREQUENCY: FREQUENCY: CONTINUOUS

## 2018-02-09 NOTE — CONSULTS
LABGLOM 15 02/09/2018    GLUCOSE 110 02/09/2018    GLUCOSE 86 03/27/2012     PT/INR:  No results found for: PROTIME, INR  PTT:  No results found for: APTT, PTT[APTT}    Assesment:     Primary Problem  Acute on chronic respiratory failure with hypercapnia [facility])    Active Hospital Problems    Diagnosis Date Noted    Acute on chronic respiratory failure with hypercapnia (HCC) [J96.22] 02/08/2018    Acute on chronic systolic heart failure (HCC) [I50.23] 02/08/2018    Drug-induced hyperglycemia [R73.9, T50.905A] 02/08/2018    Anemia [D64.9]     Abdominal aortic aneurysm (AAA) ([facility] [address] [I71.4] 02/06/2018    COPD exacerbation ([facility]) [J44.1] 02/05/2018    Type 2 diabetes mellitus without complication, with long-term current use of insulin ([facility]) [E11.9, Z79.4]     Chronic kidney disease [N18.9]      Anemia  Abdominal discomfort   Constipation  Large abdominal hernias     Plan:     1. Anemia w/u   2. Check stools for OB X 3  3. F/u HGB  4. Stool softeners  5. High fiber diet  6. Rx of pulmonary issues  7. Once stable will need GI w/u   8. His questions were answered   9. DW nursing staff in ICU        Thank you for allowing me to participate in the care of your patient. Please feel free to contact me with any questions or concerns.      Electronically signed by Brendan Qureshi MD on 2/9/2018 at 11:59 AM     Copy sent to Dr. Ran Perez MD

## 2018-02-09 NOTE — FLOWSHEET NOTE
02/09/18 1022   Provider Notification   Reason for Communication Evaluate;New orders   Provider Name Dr. Christie Mcfadden   Provider Notification Physician   Method of Communication Face to face   Response See orders   Notification Time 0930     Cardiologist in to evaluate Pt this a.m. Pt stable from cardiology standpoint. Okay to transfer out if okay with other physicians.  Electronically signed by Lulú Rascon RN on 2/9/2018 at 10:24 AM

## 2018-02-09 NOTE — FLOWSHEET NOTE
Spoke with Dr. Nawaf Stallings re:ABG results pH 7.266 and pCO2 66.5. Instructed to encourage Pt to wear BiPap during naps and at HS, approximately 10-12 hours per day. Will continue to monitor.  Electronically signed by Yovani Johnston RN on 2/9/2018 at 2:59 PM

## 2018-02-09 NOTE — PROGRESS NOTES
with   EF 25 %  Plan/   1. Cont IV lasix to  40 mg dly. 2. IV aranesp 40 mcg wkly-add IV iron venofer 100 mg dly x 8 doses  4.renal diet and 1500 mls fluid restriction  5,GI eval for anemia. 6.Pt would require chronic hemodialysis as no improvement in renal function and at risk for uremia and further volume overload . Will plan for tunneled cath on monday  7. Urology consult for urinary retention    Rayna Holcomb M.D, [facility]  Nephrologist

## 2018-02-09 NOTE — CONSULTS
- 20 100 %   02/09/18 1100 (!) 127/49 - - 68 14 100 %   02/09/18 1045 - - - 68 15 100 %   02/09/18 1030 - - - 73 18 100 %   02/09/18 1015 - - - 70 16 100 %   02/09/18 1002 (!) 125/56 - - - - -   02/09/18 1000 (!) 125/56 - - 72 17 97 %   02/09/18 0945 - - - 73 16 98 %   02/09/18 0930 (!) 130/51 - - 75 15 97 %   02/09/18 0915 - - - 72 17 99 %   02/09/18 0900 (!) 130/51 97 °F (36.1 °C) Axillary 72 16 99 %   02/09/18 0845 - - - 76 13 98 %   02/09/18 0830 - - - 73 17 99 %   02/09/18 0815 - - - 67 14 99 %   02/09/18 0800 (!) 152/48 - - 68 15 100 %   02/09/18 0751 - - - - 15 99 %   02/09/18 0745 - - - 69 12 99 %   02/09/18 0730 - - - 60 11 100 %   02/09/18 0715 - - - 62 13 100 %   02/09/18 0710 - - - 62 12 100 %   02/09/18 0700 (!) 150/52 - - 61 14 100 %   02/09/18 0600 (!) 144/49 - - 60 17 98 %   02/09/18 0500 (!) 136/53 - - 61 16 98 %   02/09/18 0400 (!) 143/51 97.6 °F (36.4 °C) Oral 62 15 99 %   02/09/18 0332 - - - - 22 -   02/09/18 0300 (!) 148/52 - - 60 16 100 %   02/09/18 0200 (!) 178/137 - - 58 16 100 %   02/09/18 0125 - - - 60 14 100 %   02/09/18 0104 - - - - 23 -   02/09/18 0100 (!) 162/78 - - 63 17 100 %   02/09/18 0000 (!) 181/76 97.4 °F (36.3 °C) Axillary 64 18 99 %   02/08/18 2300 (!) 160/60 - - 61 17 100 %   02/08/18 2200 (!) 154/59 - - 61 13 99 %   02/08/18 2117 - - - - 23 -   02/08/18 2100 (!) 152/60 - - 74 17 99 %   02/08/18 2059 - - - - - 100 %   02/08/18 2011 - - - 61 17 100 %   02/08/18 2000 (!) 169/135 98.4 °F (36.9 °C) Oral 62 17 99 %   02/08/18 1900 (!) 169/52 - - 62 17 97 %   02/08/18 1838 (!) 155/53 - - 66 13 98 %   02/08/18 1717 - 96.3 °F (35.7 °C) Axillary - - -   02/08/18 1700 (!) 156/59 - - 62 16 99 %   02/08/18 1600 (!) 159/60 - - 62 15 100 %   02/08/18 1544 - - - - 16 98 %   02/08/18 1541 - - - - 17 -   02/08/18 1400 (!) 160/52 - - 66 18 99 %   02/08/18 1313 - 97.6 °F (36.4 °C) Axillary - - -   02/08/18 1300 (!) 144/58 - - 65 15 99 %     Constitutional: Patient in no acute distress;    Neuro: alert and oriented to person place and time. Psych: Mood and affect normal.  Skin: Normal  Lungs: Respiratory effort normal  Cardiovascular:  Normal peripheral pulses  Abdomen: Massive ventral hernia noted    Kidneys normal.  Bladder non-tender and not distended. Manzanares in place, urine yellow, clear. Lymphatics: no palpable lymphadenopathy  Penis normal and circumcised  Urethral meatus normal  Scrotal exam normal  Testicles normal bilaterally  Epididymis normal bilaterally  No evidence of inguinal hernia    LABS:  Recent Labs      02/07/18   0455   02/08/18   0411   02/08/18   1629  02/08/18   2350  02/09/18   0527   WBC  5.8   --   6.9   --    --    --   6.0   HGB  7.6*   < >  8.0*   < >  8.1*  8.5*  8.6*   HCT  23.9*   < >  24.2*   < >  24.1*  25.6*  26.5*   MCV  101.0*   --   97.7   --    --    --   99.7   PLT  236   --   191   --    --    --   183    < > = values in this interval not displayed. Recent Labs      02/06/18   1638   02/07/18   0455  02/08/18   0411  02/09/18   0527   NA  140   --   146*  140  136   K  5.5*   < >  4.9  4.1  3.7   CL  103   --   107  102  97*   CO2  24   --   27  29  27   PHOS  6.6*   --    --    --    --    BUN  79*   --   77*  83*  87*   CREATININE  3.86*   --   3.90*  3.87*  3.94*    < > = values in this interval not displayed. Lab Results   Component Value Date    PSA 0.62 02/08/2016       Additional Lab/culture results:    Urinalysis: No results for input(s): COLORU, PHUR, LABCAST, WBCUA, RBCUA, MUCUS, TRICHOMONAS, YEAST, BACTERIA, CLARITYU, SPECGRAV, LEUKOCYTESUR, UROBILINOGEN, Erasmo Plough in the last 72 hours. Invalid input(s): NITRATE, GLUCOSEUKETONESUAMORPHOUS     -----------------------------------------------------------------  Imaging Results:    CT images reviewed and negative.      Assessment and Plan   Impression:    Patient Active Problem List   Diagnosis    Type 2 diabetes mellitus without complication, with long-term current use of insulin

## 2018-02-09 NOTE — PROGRESS NOTES
[facility] CARDIOLOGY Progress Note    2/9/2018 10:13 AM      Subjective:  Mr. Kiana Ibrahim better and breathing better. Off of BiPAP. Denies any chest pain or palpitations or lightheadedness or dizziness or fatigue.              LABS:     Recent Results (from the past 24 hour(s))   POC Glucose Fingerstick    Collection Time: 02/08/18 11:12 AM   Result Value Ref Range    POC Glucose 285 (H) 75 - 110 mg/dL   POC Glucose Fingerstick    Collection Time: 02/08/18  4:24 PM   Result Value Ref Range    POC Glucose 115 (H) 75 - 110 mg/dL   Hgb/Hct    Collection Time: 02/08/18  4:29 PM   Result Value Ref Range    Hemoglobin 8.1 (L) 13.5 - 17.5 g/dL    Hematocrit 24.1 (L) 41 - 53 %   POC Glucose Fingerstick    Collection Time: 02/08/18  8:49 PM   Result Value Ref Range    POC Glucose 174 (H) 75 - 110 mg/dL   Hgb/Hct    Collection Time: 02/08/18 11:50 PM   Result Value Ref Range    Hemoglobin 8.5 (L) 13.5 - 17.5 g/dL    Hematocrit 25.6 (L) 41 - 53 %   Basic Metabolic Panel w/ Reflex to MG    Collection Time: 02/09/18  5:27 AM   Result Value Ref Range    Glucose 110 (H) 70 - 99 mg/dL    BUN 87 (H) 8 - 23 mg/dL    CREATININE 3.94 (H) 0.70 - 1.20 mg/dL    Bun/Cre Ratio NOT REPORTED 9 - 20    Calcium 8.3 (L) 8.6 - 10.4 mg/dL    Sodium 136 135 - 144 mmol/L    Potassium 3.7 3.7 - 5.3 mmol/L    Chloride 97 (L) 98 - 107 mmol/L    CO2 27 20 - 31 mmol/L    Anion Gap 12 9 - 17 mmol/L    GFR Non-African American 15 (L) >60 mL/min    GFR  19 (L) >60 mL/min    GFR Comment          GFR Staging NOT REPORTED    CBC    Collection Time: 02/09/18  5:27 AM   Result Value Ref Range    WBC 6.0 3.5 - 11.0 k/uL    RBC 2.66 (L) 4.5 - 5.9 m/uL    Hemoglobin 8.6 (L) 13.5 - 17.5 g/dL    Hematocrit 26.5 (L) 41 - 53 %    MCV 99.7 80 - 100 fL    MCH 32.4 26 - 34 pg    MCHC 32.5 31 - 37 g/dL    RDW 18.6 (H) 11.5 - 14.9 %    Platelets 690 631 - 233 k/uL    MPV 7.5 6.0 - 12.0 fL    NRBC Automated NOT REPORTED per 100 WBC   POC

## 2018-02-09 NOTE — PROGRESS NOTES
as low as reasonably achievable. COMPARISON: CT abdomen/ pelvis from 01/18/2010. HISTORY: ORDERING SYSTEM PROVIDED HISTORY: R/O Retroperitoneal bleed. HbG continues to drop s/p 2 U PRBCs TECHNOLOGIST PROVIDED HISTORY: Additional Contrast?->Radiologist Recommendation Ordering Physician Provided Reason for Exam: DR LOOKING FOR A BLED. BLOOD COUNTS ARE GOING DOWN. PT IS ALLERGIC TO IV CONTRAST. GFR IS TOO LOW TO INJECT. FINDINGS: Lung bases: There is progression of centrilobular emphysematous changes in the lung bases. There are peripheral nodular opacities, may reflect atelectasis versus true lung nodules. No pleural or pericardial effusion. There is moderate multichamber cardiac enlargement. Organs: The liver has normal size and contours. No focal intrahepatic mass lesion identified. No extrahepatic biliary ductal dilatation. The gallbladder is present. The main portal vein is prominent in appearance. The spleen, pancreas and adrenal glands have a normal noncontrast CT appearance. The kidneys are symmetric in size and noncontrast appearance. There are cortical hypodensities in the bilateral kidneys, which are incompletely evaluated on the basis of this noncontrast study, but all have attenuation characteristics consistent with simple fluid and statistically most likely relate to benign renal cysts. Nonemergent renal ultrasound could be considered for further evaluation, as clinically indicated. No suspicious renal lesions identified. No obstructive uropathy. GI/bowel:  Similar appearance of ligamentous laxity versus large anterior abdominal wall defect containing small and large bowel loops. There is a large stool burden in the colon. No dilated loops of bowel, or findings to suggest obstruction. No mural thickening or adjacent inflammatory changes identified. An appendix is not confidently identified but there are no inflammatory changes identified in the right lower quadrant of the abdomen. Savita Chaudhry including pertinent history and exam findings,    today with the resident. I have seen and examined the patient and the key elements of all parts of the encounter have been performed by me . I agree with the assessment, plan and orders as documented by the resident. Principal Problem:    Acute on chronic respiratory failure with hypercapnia (HCC)  Active Problems:    Type 2 diabetes mellitus without complication, with long-term current use of insulin (HCC)    Chronic kidney disease    COPD exacerbation (HCC)    Abdominal aortic aneurysm (AAA) (HCC)    Acute on chronic systolic heart failure (HCC)    Drug-induced hyperglycemia    Anemia    Gastrointestinal hemorrhage    Abdominal wall hernia    Absolute anemia  Resolved Problems:    * No resolved hospital problems.  *       Electronically signed by Kurt Bob MD

## 2018-02-10 LAB
ANION GAP SERPL CALCULATED.3IONS-SCNC: 9 MMOL/L (ref 9–17)
BUN BLDV-MCNC: 93 MG/DL (ref 8–23)
BUN/CREAT BLD: ABNORMAL (ref 9–20)
CALCIUM SERPL-MCNC: 8.2 MG/DL (ref 8.6–10.4)
CHLORIDE BLD-SCNC: 99 MMOL/L (ref 98–107)
CO2: 31 MMOL/L (ref 20–31)
CREAT SERPL-MCNC: 3.87 MG/DL (ref 0.7–1.2)
CREATININE URINE: 51.5 MG/DL (ref 39–259)
GFR AFRICAN AMERICAN: 19 ML/MIN
GFR NON-AFRICAN AMERICAN: 16 ML/MIN
GFR SERPL CREATININE-BSD FRML MDRD: ABNORMAL ML/MIN/{1.73_M2}
GFR SERPL CREATININE-BSD FRML MDRD: ABNORMAL ML/MIN/{1.73_M2}
GLUCOSE BLD-MCNC: 103 MG/DL (ref 75–110)
GLUCOSE BLD-MCNC: 113 MG/DL (ref 70–99)
GLUCOSE BLD-MCNC: 258 MG/DL (ref 75–110)
GLUCOSE BLD-MCNC: 94 MG/DL (ref 75–110)
HCT VFR BLD CALC: 25 % (ref 41–53)
HCT VFR BLD CALC: 25.8 % (ref 41–53)
HEMOGLOBIN: 8.2 G/DL (ref 13.5–17.5)
HEMOGLOBIN: 8.4 G/DL (ref 13.5–17.5)
MCH RBC QN AUTO: 33.2 PG (ref 26–34)
MCHC RBC AUTO-ENTMCNC: 33.5 G/DL (ref 31–37)
MCV RBC AUTO: 99.1 FL (ref 80–100)
NRBC AUTOMATED: ABNORMAL PER 100 WBC
PDW BLD-RTO: 18.7 % (ref 11.5–14.9)
PLATELET # BLD: 167 K/UL (ref 150–450)
PMV BLD AUTO: 7.3 FL (ref 6–12)
POTASSIUM SERPL-SCNC: 4.7 MMOL/L (ref 3.7–5.3)
RBC # BLD: 2.52 M/UL (ref 4.5–5.9)
SODIUM BLD-SCNC: 139 MMOL/L (ref 135–144)
SODIUM,UR: 61 MMOL/L
UREA NITROGEN, UR: 560 MG/DL
WBC # BLD: 5.6 K/UL (ref 3.5–11)

## 2018-02-10 PROCEDURE — 94640 AIRWAY INHALATION TREATMENT: CPT

## 2018-02-10 PROCEDURE — 6370000000 HC RX 637 (ALT 250 FOR IP): Performed by: NURSE PRACTITIONER

## 2018-02-10 PROCEDURE — 82570 ASSAY OF URINE CREATININE: CPT

## 2018-02-10 PROCEDURE — 84540 ASSAY OF URINE/UREA-N: CPT

## 2018-02-10 PROCEDURE — 85018 HEMOGLOBIN: CPT

## 2018-02-10 PROCEDURE — 2580000003 HC RX 258: Performed by: INTERNAL MEDICINE

## 2018-02-10 PROCEDURE — C9113 INJ PANTOPRAZOLE SODIUM, VIA: HCPCS | Performed by: STUDENT IN AN ORGANIZED HEALTH CARE EDUCATION/TRAINING PROGRAM

## 2018-02-10 PROCEDURE — 6370000000 HC RX 637 (ALT 250 FOR IP): Performed by: STUDENT IN AN ORGANIZED HEALTH CARE EDUCATION/TRAINING PROGRAM

## 2018-02-10 PROCEDURE — 2580000003 HC RX 258: Performed by: NURSE PRACTITIONER

## 2018-02-10 PROCEDURE — 6370000000 HC RX 637 (ALT 250 FOR IP): Performed by: FAMILY MEDICINE

## 2018-02-10 PROCEDURE — 94762 N-INVAS EAR/PLS OXIMTRY CONT: CPT

## 2018-02-10 PROCEDURE — 2060000000 HC ICU INTERMEDIATE R&B

## 2018-02-10 PROCEDURE — 6360000002 HC RX W HCPCS: Performed by: INTERNAL MEDICINE

## 2018-02-10 PROCEDURE — 36415 COLL VENOUS BLD VENIPUNCTURE: CPT

## 2018-02-10 PROCEDURE — 84300 ASSAY OF URINE SODIUM: CPT

## 2018-02-10 PROCEDURE — 99232 SBSQ HOSP IP/OBS MODERATE 35: CPT | Performed by: INTERNAL MEDICINE

## 2018-02-10 PROCEDURE — 6360000002 HC RX W HCPCS: Performed by: STUDENT IN AN ORGANIZED HEALTH CARE EDUCATION/TRAINING PROGRAM

## 2018-02-10 PROCEDURE — 94660 CPAP INITIATION&MGMT: CPT

## 2018-02-10 PROCEDURE — 85014 HEMATOCRIT: CPT

## 2018-02-10 PROCEDURE — 82947 ASSAY GLUCOSE BLOOD QUANT: CPT

## 2018-02-10 PROCEDURE — 6370000000 HC RX 637 (ALT 250 FOR IP): Performed by: INTERNAL MEDICINE

## 2018-02-10 PROCEDURE — 80048 BASIC METABOLIC PNL TOTAL CA: CPT

## 2018-02-10 PROCEDURE — 2580000003 HC RX 258: Performed by: STUDENT IN AN ORGANIZED HEALTH CARE EDUCATION/TRAINING PROGRAM

## 2018-02-10 PROCEDURE — 85027 COMPLETE CBC AUTOMATED: CPT

## 2018-02-10 RX ORDER — OXYCODONE HYDROCHLORIDE AND ACETAMINOPHEN 5; 325 MG/1; MG/1
1 TABLET ORAL EVERY 6 HOURS PRN
Status: DISCONTINUED | OUTPATIENT
Start: 2018-02-10 | End: 2018-02-18

## 2018-02-10 RX ORDER — SODIUM CHLORIDE 9 MG/ML
INJECTION, SOLUTION INTRAVENOUS CONTINUOUS
Status: DISCONTINUED | OUTPATIENT
Start: 2018-02-10 | End: 2018-02-12

## 2018-02-10 RX ORDER — OXYCODONE AND ACETAMINOPHEN 10; 325 MG/1; MG/1
1 TABLET ORAL EVERY 6 HOURS PRN
Status: DISCONTINUED | OUTPATIENT
Start: 2018-02-10 | End: 2018-02-10

## 2018-02-10 RX ORDER — OXYCODONE HYDROCHLORIDE 5 MG/1
5 TABLET ORAL EVERY 6 HOURS PRN
Status: DISCONTINUED | OUTPATIENT
Start: 2018-02-10 | End: 2018-02-18

## 2018-02-10 RX ORDER — DOCUSATE SODIUM 100 MG/1
200 CAPSULE, LIQUID FILLED ORAL DAILY
Status: DISCONTINUED | OUTPATIENT
Start: 2018-02-10 | End: 2018-02-27 | Stop reason: HOSPADM

## 2018-02-10 RX ADMIN — IPRATROPIUM BROMIDE AND ALBUTEROL SULFATE 1 AMPULE: .5; 3 SOLUTION RESPIRATORY (INHALATION) at 15:55

## 2018-02-10 RX ADMIN — IRON SUCROSE 100 MG: 20 INJECTION, SOLUTION INTRAVENOUS at 12:07

## 2018-02-10 RX ADMIN — ISOSORBIDE MONONITRATE 30 MG: 30 TABLET, EXTENDED RELEASE ORAL at 09:08

## 2018-02-10 RX ADMIN — TAMSULOSIN HYDROCHLORIDE 0.4 MG: 0.4 CAPSULE ORAL at 09:08

## 2018-02-10 RX ADMIN — ESCITALOPRAM OXALATE 20 MG: 20 TABLET ORAL at 09:07

## 2018-02-10 RX ADMIN — HYDRALAZINE HYDROCHLORIDE 25 MG: 25 TABLET, FILM COATED ORAL at 20:24

## 2018-02-10 RX ADMIN — CARVEDILOL 6.25 MG: 6.25 TABLET, FILM COATED ORAL at 09:08

## 2018-02-10 RX ADMIN — PREDNISONE 20 MG: 20 TABLET ORAL at 09:08

## 2018-02-10 RX ADMIN — HYDRALAZINE HYDROCHLORIDE 25 MG: 25 TABLET, FILM COATED ORAL at 16:35

## 2018-02-10 RX ADMIN — SODIUM CHLORIDE: 9 INJECTION, SOLUTION INTRAVENOUS at 15:37

## 2018-02-10 RX ADMIN — INSULIN LISPRO 6 UNITS: 100 INJECTION, SOLUTION INTRAVENOUS; SUBCUTANEOUS at 12:07

## 2018-02-10 RX ADMIN — HYDRALAZINE HYDROCHLORIDE 25 MG: 25 TABLET, FILM COATED ORAL at 09:09

## 2018-02-10 RX ADMIN — SODIUM BICARBONATE 650 MG: 650 TABLET, ORALLY DISINTEGRATING ORAL at 20:23

## 2018-02-10 RX ADMIN — OXYCODONE HYDROCHLORIDE AND ACETAMINOPHEN 1 TABLET: 5; 325 TABLET ORAL at 16:40

## 2018-02-10 RX ADMIN — OXYCODONE HYDROCHLORIDE 5 MG: 5 TABLET ORAL at 16:40

## 2018-02-10 RX ADMIN — OXYCODONE HYDROCHLORIDE 5 MG: 5 TABLET ORAL at 22:41

## 2018-02-10 RX ADMIN — ATORVASTATIN CALCIUM 80 MG: 80 TABLET, FILM COATED ORAL at 20:23

## 2018-02-10 RX ADMIN — FUROSEMIDE 40 MG: 10 INJECTION, SOLUTION INTRAVENOUS at 09:11

## 2018-02-10 RX ADMIN — DOCUSATE SODIUM 200 MG: 100 CAPSULE, LIQUID FILLED ORAL at 09:08

## 2018-02-10 RX ADMIN — OXYCODONE HYDROCHLORIDE AND ACETAMINOPHEN 1 TABLET: 5; 325 TABLET ORAL at 22:41

## 2018-02-10 RX ADMIN — OXYCODONE HYDROCHLORIDE 5 MG: 5 TABLET ORAL at 02:17

## 2018-02-10 RX ADMIN — IPRATROPIUM BROMIDE AND ALBUTEROL SULFATE 1 AMPULE: .5; 3 SOLUTION RESPIRATORY (INHALATION) at 07:57

## 2018-02-10 RX ADMIN — CARVEDILOL 6.25 MG: 6.25 TABLET, FILM COATED ORAL at 16:35

## 2018-02-10 RX ADMIN — PREDNISONE 20 MG: 20 TABLET ORAL at 20:24

## 2018-02-10 RX ADMIN — IPRATROPIUM BROMIDE AND ALBUTEROL SULFATE 1 AMPULE: .5; 3 SOLUTION RESPIRATORY (INHALATION) at 12:03

## 2018-02-10 RX ADMIN — SODIUM BICARBONATE 650 MG: 650 TABLET, ORALLY DISINTEGRATING ORAL at 09:08

## 2018-02-10 RX ADMIN — MULTIPLE VITAMINS W/ MINERALS TAB 1 TABLET: TAB at 09:08

## 2018-02-10 RX ADMIN — Medication 10 ML: at 09:12

## 2018-02-10 RX ADMIN — Medication 2 PUFF: at 09:10

## 2018-02-10 RX ADMIN — SODIUM CHLORIDE 8 MG/HR: 9 INJECTION, SOLUTION INTRAVENOUS at 12:05

## 2018-02-10 RX ADMIN — Medication 2 PUFF: at 20:24

## 2018-02-10 ASSESSMENT — PAIN DESCRIPTION - FREQUENCY: FREQUENCY: CONTINUOUS

## 2018-02-10 ASSESSMENT — PAIN SCALES - GENERAL
PAINLEVEL_OUTOF10: 0
PAINLEVEL_OUTOF10: 0
PAINLEVEL_OUTOF10: 7
PAINLEVEL_OUTOF10: 7
PAINLEVEL_OUTOF10: 8

## 2018-02-10 ASSESSMENT — PAIN DESCRIPTION - DESCRIPTORS: DESCRIPTORS: ACHING

## 2018-02-10 ASSESSMENT — PAIN DESCRIPTION - LOCATION: LOCATION: ABDOMEN

## 2018-02-10 ASSESSMENT — PAIN DESCRIPTION - ONSET: ONSET: AWAKENED FROM SLEEP

## 2018-02-10 NOTE — PROGRESS NOTES
[address] [address]    Progress Note    2/10/2018    9:12 AM    Name:   Judi Jaimes  MRN:     [id number]     [place]:      [de-identified]   Room:   [address] [place] [address] Day:  5  Admit Date:  2/5/2018  7:29 PM    PCP:   Nithya Dyson MD  Code Status:  Full Code    Subjective:     C/C:   Chief Complaint   Patient presents with    Shortness of Breath     Interval History Status: improved. Patient seen at bedside. States that his breathing feels '100% better' since initiation of BiPAP. Was presently using 3LPM by NC, which is his home dose. Scheduled for tunnel catheter for HD on Monday. Pt still has not had bowel movement, but appetite has returned. His only complaint was abdominal pain, which he said Roxicodone 5mg was not addressing. Agreed to resume his home medication of Percocet 5-10 mg Q6h     Brief History:        The patient is a 75 y.o. Non-/non  male wit hPMHx of COPD on 3 LPM, Systolic CHF, and CKD baseline Cr of 2.90 who presents with Shortness of Breath. Patient was at PCP on 2/1 for similar complaint, and was given Levaquin and medrol dose pack PO. At the time, he had a productive cough, and chest pain with coughing. He had no improvement in his Sx, and came to ED for revaluation. In the ED, his O2 sat was at 85% on 3 LPM. He was given 4 LPM by Venturi mask. Pulmonology was consulted. CXR showed small B/L Pleural effusions and R sided hilar density. His Cr was also elevated at 3.70, so nephrology was consulted. Hgb was 7.0 in ED and he was given 1 U PRBC. Pt denied SANJAY, but reports possible bloody stools. He was given IV solumedrol bolus, and admitted to ICU intermediate   for the management of  Acute on Chronic Respiratory Failure     Review of Systems:     ROS  Review of Systems   Constitutional: Negative for chills and fever. Respiratory: Negative for cough and shortness of breath.     Cardiovascular: Negative for chest based adjustment of the mA/kV was utilized to reduce the radiation dose to as low as reasonably achievable. COMPARISON: CT abdomen/ pelvis from 01/18/2010. HISTORY: ORDERING SYSTEM PROVIDED HISTORY: R/O Retroperitoneal bleed. HbG continues to drop s/p 2 U PRBCs TECHNOLOGIST PROVIDED HISTORY: Additional Contrast?->Radiologist Recommendation Ordering Physician Provided Reason for Exam:  LOOKING FOR A BLED. BLOOD COUNTS ARE GOING DOWN. PT IS ALLERGIC TO IV CONTRAST. GFR IS TOO LOW TO INJECT. FINDINGS: Lung bases: There is progression of centrilobular emphysematous changes in the lung bases. There are peripheral nodular opacities, may reflect atelectasis versus true lung nodules. No pleural or pericardial effusion. There is moderate multichamber cardiac enlargement. Organs: The liver has normal size and contours. No focal intrahepatic mass lesion identified. No extrahepatic biliary ductal dilatation. The gallbladder is present. The main portal vein is prominent in appearance. The spleen, pancreas and adrenal glands have a normal noncontrast CT appearance. The kidneys are symmetric in size and noncontrast appearance. There are cortical hypodensities in the bilateral kidneys, which are incompletely evaluated on the basis of this noncontrast study, but all have attenuation characteristics consistent with simple fluid and statistically most likely relate to benign renal cysts. Nonemergent renal ultrasound could be considered for further evaluation, as clinically indicated. No suspicious renal lesions identified. No obstructive uropathy. GI/bowel:  Similar appearance of ligamentous laxity versus large anterior abdominal wall defect containing small and large bowel loops. There is a large stool burden in the colon. No dilated loops of bowel, or findings to suggest obstruction. No mural thickening or adjacent inflammatory changes identified.   An appendix is not confidently identified but there are no inflammatory changes identified in the right lower quadrant of the abdomen. . Peritoneum/retroperitoneum:  No lymphadenopathy, free fluid or free air identified in the abdomen or pelvis. Stable findings associated with patient's axillary bifemoral graft with the visualized component of the graft descending along the left chest wall and left flank and a communicating with the left femoral artery with bridge connecting to the right femoral artery. There is focal occlusion of the abdominal aorta inferior to the take-off of the bilateral renal arteries. No retroperitoneal or intramuscular hematoma identified. Pelvis: Manzanares catheter in the urinary bladder. Normal noncontrast CT appearance of the prostate. . The urinary bladder is unremarkable. Bones/soft tissues: Similar appearance of increased sclerosis of the left femoral head may reflect sequelae of prior AVN. No suspicious osseous lesions are identified. 1. No retroperitoneal or intramuscular hematoma. 2. There is new nonspecific prominence of the main portal vein. Could consider Doppler integration for further evaluation, as indicated. 3. There are centrilobular emphysematous changes in the lung bases with peripherally oriented nodular opacities, may reflect atelectatic changes, but true lung nodules are not excluded. Nonemergent chest CT is recommended for further evaluation. 4. Other stable findings, as above. Xr Chest Standard (2 Vw)    Result Date: 2/5/2018  EXAMINATION: TWO VIEWS OF THE CHEST 2/5/2018 8:56 pm COMPARISON: November 24, 2015 HISTORY: ORDERING SYSTEM PROVIDED HISTORY: shortness of breath TECHNOLOGIST PROVIDED HISTORY: Reason for exam:->shortness of breath Ordering Physician Provided Reason for Exam: shortness of breath Acuity: Acute Type of Exam: Initial FINDINGS: Right hilar fullness has increased. Small pleural effusions are suspected. Chronic interstitial lung changes are again seen. Cardiomegaly. No pulmonary edema.  Diffuse osteopenia with thoracic spine degenerative changes. Small pleural effusions. Increased right hilar density may reflect underlying adenopathy. Follow-up with chest CT may be helpful. Ct Chest Wo Contrast    Result Date: 2/6/2018  EXAMINATION: CT OF THE CHEST WITHOUT CONTRAST 2/6/2018 2:41 pm TECHNIQUE: CT of the chest was performed without the administration of intravenous contrast. Multiplanar reformatted images are provided for review. Dose modulation, iterative reconstruction, and/or weight based adjustment of the mA/kV was utilized to reduce the radiation dose to as low as reasonably achievable. COMPARISON: January 8, 2016 HISTORY: ORDERING SYSTEM PROVIDED HISTORY: ACUTE RESP ILLNESS, >36YEARS OLD TECHNOLOGIST PROVIDED HISTORY: Ordering Physician Provided Reason for Exam: acute respiratory failure Acuity: Unknown Type of Exam: Unknown FINDINGS: Evaluation is limited by motion. Mediastinum: Cardiomegaly. No pericardial effusion. Coronary artery calcifications are seen. No mediastinal or hilar adenopathy is seen within limitations of a noncontrast study. Atherosclerotic calcification of the thoracic aorta. Lungs/Pleura: Trace pleural effusions. No pneumothorax. Chronic interstitial lung changes and emphysematous changes are again seen. Scarring is seen at the lung apices. No focal consolidation. Bronchiectasis is seen within the lower lobes. Adjacent to a vessel within the left lower lobe there is a 4 mm nodule series 4, image 70, not definitely seen on the prior study. A 4 mm nodular opacity is seen within the left lower lobe series 4, image 86, not definitely seen on the prior study. Within the right upper lobe there is a 9 mm spiculated nodule suboptimally visualized due to motion at this level series 4, image 34. This is not clearly seen on the prior study. Spiculated nodules along the right fissure appear smaller than on the previous study.  A 10 mm subpleural nodule is seen within the flattening of the diaphragms, likely related to COPD. There is mild pulmonary vascular congestion. There is mild atelectasis and small pleural effusions at the bilateral lung bases. No evidence of pneumothorax. The osseous structures are stable. Mild cardiomegaly. Mild pulmonary vascular congestion. Probable COPD. Mild atelectasis and small pleural effusions at the bilateral lung bases. Xr Chest Portable    Result Date: 2/6/2018  EXAMINATION: SINGLE VIEW OF THE CHEST 2/6/2018 6:52 am COMPARISON: 02/05/2018 HISTORY: ORDERING SYSTEM PROVIDED HISTORY: AECOPD TECHNOLOGIST PROVIDED HISTORY: Reason for exam:->AECOPD Acuity: Unknown Type of Exam: Unknown Additional signs and symptoms: Following COPD. FINDINGS: Stable mild cardiomegaly and mild pulmonary vascular congestion. Pulmonary emphysema with bilateral fibrotic changes. Bilateral apical pleural thickening. Emphysematous bulla in the right lung base. Bilateral prominent yinka consistent with enlargement of the central pulmonary arteries. Right suprahilar parenchymal consolidation extending into the right upper lobe, unchanged. Blunting of the costophrenic angle suggesting small effusions. No pneumothorax. No significant interval radiographic change. Physical Examination:        Physical Exam  Constitutional: No distress. Thin gentleman sitting in bed on NC in no distress   HENT:   Head: Normocephalic and atraumatic. Eyes: Conjunctivae and EOM are normal. Right eye exhibits no discharge. Left eye exhibits no discharge. Cardiovascular: Normal rate and intact distal pulses. Distant heart sounds   Pulmonary/Chest: Effort normal. No respiratory distress. He has no wheezes. Bilateral end-inspiratory ronchi R> L  Abdominal: Soft. He exhibits no distension. There is no tenderness. There is no rebound and no guarding.    Multiple surgical scars due to prior surgeries for AAA, anterior fascial defect due to multiple prior surgeries Musculoskeletal: Normal range of motion. He exhibits no edema. Neurological: He is alert. Skin: Skin is warm and dry. He is not diaphoretic.    Psychiatric: Mood normal.        Assessment:        Primary Problem  Acute on chronic respiratory failure with hypercapnia [facility])    Active Hospital Problems    Diagnosis Date Noted    Gastrointestinal hemorrhage [K92.2]     Abdominal wall hernia [K43.9]     Absolute anemia [D64.9]     Acute on chronic respiratory failure with hypercapnia ([facility] [J96.22] 2018    Acute on chronic systolic heart failure (HCC) [I50.23] 2018    Drug-induced hyperglycemia [R73.9, T50.905A] 2018    Anemia [D64.9]     [facility] [I71.4] 2018    COPD exacerbation ([facility]) [J44.1] 2018    Type 2 diabetes mellitus without complication, with long-term current use of insulin ([facility]) [E11.9, Z79.4]     Chronic kidney disease [N18.9]        Plan:        Hypercapnic acute-on-chronic respiratory failure   - Pulmonology on board   - Repeat AB.26/66.5/65.9/30.2/3.2 on 3 LPM NC   - Duonebs, Dulera, BiPAP 12 hours   - Prednisone PO 20mg BID   - RT eval and treat     Systolic CHF   - Cardiology on board   - EF 25%   - 1500 mL Fluid Restrict   - (-) 1325 mL in last 24h; (-) 4.1L since admission   - IV Lasix 40 mg QD     ?ESRD, ALBERTINA on CKD refractory to Diuresis   - PT Scheduled for Tunneled Cath placement on    - Nephrology on board   - On Aranesp   - Likely prerenal 2/2 anemia   - Baseline creatinine ~2.9   - Cr 3.94 -> 3.87     Anemia, 2/2 chronic disease, ?acute loss   - GI on board   - Reticulocyte count 2.1%   - S/p 2u pRBCs with Hgb steady around 8.1-8.6   - Venofer per nephro (8 doses total)   - Hemoccult negative x1, repeat with stool sample, no overnight stools   - Colace 200mg PO QD for BM     HTN   - Coreg 6.25 BID, hydralazine 25 TID, Imdur 30mg QD     Hyperglycemia 2/2 steroids   - POCT glucose    - ISS low dose     Multiple nodules on CT chest   - Past smoker, 1-2 PPD for 40 years   - 2 prior pulmonary nodules with mediastinal lymphadenopathy found Jan 2016; interval increased size of left middle lobe density   - F/u outpatient pulmonology    Abdominal Pain, Chronic 15 year hx   - Home PO Percocet 5-325 mg for pain 4-6, or Percocet  mg for pain     Merlyn Matias MD  2/10/2018  9:12 AM     I have discussed the care of Bevely Howard , including pertinent history and exam findings,    today with the resident. I have seen and examined the patient and the key elements of all parts of the encounter have been performed by me . I agree with the assessment, plan and orders as documented by the resident. Principal Problem:    Acute on chronic respiratory failure with hypercapnia (HCC)  Active Problems:    Type 2 diabetes mellitus without complication, with long-term current use of insulin (HCC)    Chronic kidney disease    COPD exacerbation (HCC)    Abdominal aortic aneurysm (AAA) (HCC)    Acute on chronic systolic heart failure (HCC)    Drug-induced hyperglycemia    Anemia    Gastrointestinal hemorrhage    Abdominal wall hernia    Absolute anemia  Resolved Problems:    * No resolved hospital problems.  *       Electronically signed by Sanjeev Mckenna MD

## 2018-02-10 NOTE — PROGRESS NOTES
Bipap on standby at this time. Pulse Ox 93%   Nasal gel pad available at bedside. Pt awake/alert/no distress noted.

## 2018-02-10 NOTE — PROGRESS NOTES
[facility] CARDIOLOGY Progress Note    2/10/2018 10:37 AM      Subjective:  Mr. Paola Soriano  denies any chest pain or palpitations or lightheadedness or dizziness or fatigue. Improved shortness of breath. Apparently being planned for hemodialysis on 02/12/2018 per patient's report. LABS:     Recent Results (from the past 24 hour(s))   POC Glucose Fingerstick    Collection Time: 02/09/18 12:09 PM   Result Value Ref Range    POC Glucose 171 (H) 75 - 110 mg/dL   Arterial Blood Gases    Collection Time: 02/09/18  1:34 PM   Result Value Ref Range    pH, Arterial 7.266     pCO2, Arterial 66.5 mmHg    pO2, Arterial 65.9 mmHg    HCO3, Arterial 30.2 mmol/L    Positive Base Excess, Art 3.2 (H) 0.0 - 2.0 mmol/L    Negative Base Excess, Art NOT REPORTED 0.0 - 2.0 mmol/L    O2 Sat, Arterial 89.8 %    Total Hb NOT REPORTED 12.0 - 16.0 g/dl    Oxyhemoglobin NOT REPORTED 95.0 - 98.0 %    Carboxyhemoglobin 1.0 %    Methemoglobin 0.9 %    Pt Temp 37.0     pH, Art, Temp Adj NOT REPORTED     pCO2, Art, Temp Adj NOT REPORTED     pO2, Art, Temp Adj NOT REPORTED mmHg    O2 Device/Flow/% Cannula     Respiratory Rate 19     Jamal Test PASS     Sample Site Right Brachial Artery     Pt.  Position NOT REPORTED     Mode NOT REPORTED     Set Rate NOT REPORTED     Total Rate NOT REPORTED     VT NOT REPORTED     FIO2  3L NC     Peep/Cpap NOT REPORTED     PSV NOT REPORTED     Text for Respiratory  RESULTS TO RN PALOMA     NOTIFICATION NOT REPORTED     NOTIFICATION TIME NOT REPORTED    POC Glucose Fingerstick    Collection Time: 02/09/18  5:37 PM   Result Value Ref Range    POC Glucose 101 75 - 110 mg/dL   Hgb/Hct    Collection Time: 02/09/18  6:42 PM   Result Value Ref Range    Hemoglobin 9.0 (L) 13.5 - 17.5 g/dL    Hematocrit 27.6 (L) 41 - 53 %   POC Glucose Fingerstick    Collection Time: 02/09/18  8:30 PM   Result Value Ref Range    POC Glucose 119 (H) 75 - 110 mg/dL   Hgb/Hct    Collection Time: 02/09/18 11:49 PM   Result Value

## 2018-02-10 NOTE — PROGRESS NOTES
GI Progress notes    2/10/2018   10:27 AM    Name:  Madisyn Olivares  MRN:    [id number]     [place]:     [de-identified]   Room:  [address] [place] [address] Day: 5     Admit Date: 2/5/2018  7:29 PM  PCP: Rey Bernard MD    Subjective:     C/C:   Chief Complaint   Patient presents with    Shortness of Breath       No sign of acute GI bleed  No bowel movement today  Tolerated food well  Hemoglobin is stable  Breathing better  BUN/creatinine is still up      Medications: Allergies:    Allergies   Allergen Reactions    Codeine     Contrast [Iodides]      IVP dyes       Current Meds:     oxyCODONE-acetaminophen (PERCOCET) 5-325 MG per tablet 1 tablet Q6H PRN   And    oxyCODONE (ROXICODONE) immediate release tablet 5 mg Q6H PRN   docusate sodium (COLACE) capsule 200 mg Daily   predniSONE (DELTASONE) tablet 20 mg BID   isosorbide mononitrate (IMDUR) extended release tablet 30 mg Daily   pantoprazole (PROTONIX) 80 mg in sodium chloride 0.9 % 100 mL infusion Continuous   iron sucrose (VENOFER) 100 mg in sodium chloride 0.9 % 100 mL IVPB Q24H   hydrALAZINE (APRESOLINE) tablet 25 mg TID   hydrALAZINE (APRESOLINE) injection 10 mg Q6H PRN   furosemide (LASIX) injection 40 mg Daily   darbepoetin trang-polysorbate (ARANESP) injection 25 mcg Weekly - Thursday   sodium chloride flush 0.9 % injection 10 mL 2 times per day   sodium chloride flush 0.9 % injection 10 mL PRN   acetaminophen (TYLENOL) tablet 650 mg Q4H PRN   magnesium hydroxide (MILK OF MAGNESIA) 400 MG/5ML suspension 30 mL Daily PRN   bisacodyl (DULCOLAX) suppository 10 mg Daily PRN   ondansetron (ZOFRAN) injection 4 mg Q6H PRN   nicotine (NICODERM CQ) 21 MG/24HR 1 patch Daily PRN   albuterol (PROVENTIL) nebulizer solution 2.5 mg Q2H PRN   ipratropium-albuterol (DUONEB) nebulizer solution 1 ampule Q4H WA   carvedilol (COREG) tablet 6.25 mg BID WC   escitalopram (LEXAPRO) tablet 20 mg Daily   mometasone-formoterol (DULERA) 200-5 MCG/ACT inhaler 2 puff BID   tamsulosin (FLOMAX) respiratory failure with hypercapnia [facility])     Active Hospital Problems    Diagnosis Date Noted    Gastrointestinal hemorrhage [K92.2]     Abdominal wall hernia [K43.9]     Absolute anemia [D64.9]     Acute on chronic respiratory failure with hypercapnia ([facility] [J96.22] 02/08/2018    Acute on chronic systolic heart failure (HCC) [I50.23] 02/08/2018    Drug-induced hyperglycemia [R73.9, T50.905A] 02/08/2018    Anemia [D64.9]     Abdominal aortic aneurysm (AAA) ([facility] [I71.4] 02/06/2018    COPD exacerbation ([facility]) [J44.1] 02/05/2018    Type 2 diabetes mellitus without complication, with long-term current use of insulin ([facility]) [E11.9, Z79.4]     Chronic kidney disease [N18.9]      Past Medical History:   Diagnosis Date    Abdominal pain, chronic, generalized     Cervicodynia     Chronic back pain     Chronic kidney disease     secondary to renal vascular disease    Cigarette smoker     COPD (chronic obstructive pulmonary disease) [facility]     DDD (degenerative disc disease), lumbar     Depression     Family history of cancer     Hernia, ventral     Hyperlipidemia     Iron deficiency anemia     Lichen simplex chronicus     PUD (peptic ulcer disease)     Renal cyst     Type II or unspecified type diabetes mellitus without mention of complication, not stated as uncontrolled ([email or web address]     Vitamin D deficiency         Plan:        1. Patient would need EGD colonoscopy , but that could be done electively as he not actively bleeding at this time         when his being creatinine normalized and he can tolerate prepas he not bleeding  2. F/u HGB  3. PPI  4.  Rx for Pul issues/nephrology      Explained to the patient and d/W Nursing Staff  Will F/U with you  Please call or Page for any issues or change in status  Thanks    Electronically signed by Darling Burnham MD on 2/10/2018 at 10:27 AM

## 2018-02-10 NOTE — PROGRESS NOTES
distress  Cardiovascular - Heart sounds are normal.  Regular rate and rhythm, SM   Abdomen - Soft, little tender, nondistended, bowel sound present  Neurologic - appropriate, following commands  Skin - No bruising or bleeding  Extremities - No clubbing, cyanosis, edema    MEDS      docusate sodium  200 mg Oral Daily    predniSONE  20 mg Oral BID    isosorbide mononitrate  30 mg Oral Daily    iron sucrose  100 mg Intravenous Q24H    hydrALAZINE  25 mg Oral TID    furosemide  40 mg Intravenous Daily    darbepoetin trang-polysorbate  25 mcg Subcutaneous Weekly - Thursday    sodium chloride flush  10 mL Intravenous 2 times per day    ipratropium-albuterol  1 ampule Inhalation Q4H WA    carvedilol  6.25 mg Oral BID WC    escitalopram  20 mg Oral Daily    mometasone-formoterol  2 puff Inhalation BID    tamsulosin  0.4 mg Oral Daily    therapeutic multivitamin-minerals  1 tablet Oral Daily    atorvastatin  80 mg Oral Nightly    sodium bicarbonate  650 mg Oral BID    insulin lispro  0-12 Units Subcutaneous TID WC    insulin lispro  0-6 Units Subcutaneous Nightly    sodium chloride  250 mL Intravenous Once    sodium chloride flush  10 mL Intravenous Q12H      pantoprozole (PROTONIX) infusion 8 mg/hr (02/10/18 1205)    dextrose       oxyCODONE-acetaminophen **AND** oxyCODONE, hydrALAZINE, sodium chloride flush, acetaminophen, magnesium hydroxide, bisacodyl, ondansetron, nicotine, albuterol, diazepam, glucose, dextrose, glucagon (rDNA), dextrose, ipratropium-albuterol, sodium chloride flush    LABS   CBC   Recent Labs      02/10/18   0515   WBC  5.6   HGB  8.4*   HCT  25.0*   MCV  99.1   PLT  167     BMP:   Lab Results   Component Value Date     02/10/2018    K 4.7 02/10/2018    CL 99 02/10/2018    CO2 31 02/10/2018    BUN 93 02/10/2018    LABALBU 2.7 02/06/2018    CREATININE 3.87 02/10/2018    CALCIUM 8.2 02/10/2018    GFRAA 19 02/10/2018    LABGLOM 16 02/10/2018     ABGs:  Lab Results   Component

## 2018-02-11 LAB
ALLEN TEST: ABNORMAL
ANION GAP SERPL CALCULATED.3IONS-SCNC: 10 MMOL/L (ref 9–17)
BUN BLDV-MCNC: 96 MG/DL (ref 8–23)
BUN/CREAT BLD: ABNORMAL (ref 9–20)
CALCIUM SERPL-MCNC: 8 MG/DL (ref 8.6–10.4)
CARBOXYHEMOGLOBIN: 1 % (ref 0–5)
CHLORIDE BLD-SCNC: 96 MMOL/L (ref 98–107)
CO2: 30 MMOL/L (ref 20–31)
CREAT SERPL-MCNC: 4.21 MG/DL (ref 0.7–1.2)
FIO2: ABNORMAL
GFR AFRICAN AMERICAN: 17 ML/MIN
GFR NON-AFRICAN AMERICAN: 14 ML/MIN
GFR SERPL CREATININE-BSD FRML MDRD: ABNORMAL ML/MIN/{1.73_M2}
GFR SERPL CREATININE-BSD FRML MDRD: ABNORMAL ML/MIN/{1.73_M2}
GLUCOSE BLD-MCNC: 118 MG/DL (ref 70–99)
GLUCOSE BLD-MCNC: 119 MG/DL (ref 75–110)
GLUCOSE BLD-MCNC: 146 MG/DL (ref 75–110)
GLUCOSE BLD-MCNC: 170 MG/DL (ref 75–110)
GLUCOSE BLD-MCNC: 184 MG/DL (ref 75–110)
GLUCOSE BLD-MCNC: 96 MG/DL (ref 75–110)
HCO3 ARTERIAL: 30.1 MMOL/L (ref 22–26)
HCT VFR BLD CALC: 25.8 % (ref 41–53)
HCT VFR BLD CALC: 26.4 % (ref 41–53)
HCT VFR BLD CALC: 26.5 % (ref 41–53)
HEMOGLOBIN: 8.3 G/DL (ref 13.5–17.5)
HEMOGLOBIN: 8.3 G/DL (ref 13.5–17.5)
HEMOGLOBIN: 8.4 G/DL (ref 13.5–17.5)
MCH RBC QN AUTO: 31.5 PG (ref 26–34)
MCHC RBC AUTO-ENTMCNC: 32 G/DL (ref 31–37)
MCV RBC AUTO: 98.4 FL (ref 80–100)
METHEMOGLOBIN: 0.8 % (ref 0–1.9)
MODE: ABNORMAL
NEGATIVE BASE EXCESS, ART: ABNORMAL MMOL/L (ref 0–2)
NOTIFICATION TIME: ABNORMAL
NOTIFICATION: ABNORMAL
NRBC AUTOMATED: ABNORMAL PER 100 WBC
O2 DEVICE/FLOW/%: ABNORMAL
O2 SAT, ARTERIAL: 93.2 % (ref 95–98)
OXYHEMOGLOBIN: ABNORMAL % (ref 95–98)
PATIENT TEMP: 37
PCO2 ARTERIAL: 68 MMHG (ref 35–45)
PCO2, ART, TEMP ADJ: ABNORMAL (ref 35–45)
PDW BLD-RTO: 18.6 % (ref 11.5–14.9)
PEEP/CPAP: ABNORMAL
PH ARTERIAL: 7.25 (ref 7.35–7.45)
PH, ART, TEMP ADJ: ABNORMAL (ref 7.35–7.45)
PLATELET # BLD: 158 K/UL (ref 150–450)
PMV BLD AUTO: 7.3 FL (ref 6–12)
PO2 ARTERIAL: 76 MMHG (ref 80–100)
PO2, ART, TEMP ADJ: ABNORMAL MMHG (ref 80–100)
POSITIVE BASE EXCESS, ART: 3 MMOL/L (ref 0–2)
POTASSIUM SERPL-SCNC: 4 MMOL/L (ref 3.7–5.3)
PSV: ABNORMAL
PT. POSITION: ABNORMAL
RBC # BLD: 2.63 M/UL (ref 4.5–5.9)
RESPIRATORY RATE: 21
SAMPLE SITE: ABNORMAL
SET RATE: ABNORMAL
SODIUM BLD-SCNC: 136 MMOL/L (ref 135–144)
TEXT FOR RESPIRATORY: ABNORMAL
TOTAL HB: ABNORMAL G/DL (ref 12–16)
TOTAL RATE: ABNORMAL
VT: ABNORMAL
WBC # BLD: 6.1 K/UL (ref 3.5–11)

## 2018-02-11 PROCEDURE — 6370000000 HC RX 637 (ALT 250 FOR IP): Performed by: INTERNAL MEDICINE

## 2018-02-11 PROCEDURE — 2060000000 HC ICU INTERMEDIATE R&B

## 2018-02-11 PROCEDURE — C9113 INJ PANTOPRAZOLE SODIUM, VIA: HCPCS | Performed by: STUDENT IN AN ORGANIZED HEALTH CARE EDUCATION/TRAINING PROGRAM

## 2018-02-11 PROCEDURE — 6360000002 HC RX W HCPCS: Performed by: STUDENT IN AN ORGANIZED HEALTH CARE EDUCATION/TRAINING PROGRAM

## 2018-02-11 PROCEDURE — 82805 BLOOD GASES W/O2 SATURATION: CPT

## 2018-02-11 PROCEDURE — 2580000003 HC RX 258: Performed by: INTERNAL MEDICINE

## 2018-02-11 PROCEDURE — 6370000000 HC RX 637 (ALT 250 FOR IP): Performed by: STUDENT IN AN ORGANIZED HEALTH CARE EDUCATION/TRAINING PROGRAM

## 2018-02-11 PROCEDURE — 99232 SBSQ HOSP IP/OBS MODERATE 35: CPT | Performed by: INTERNAL MEDICINE

## 2018-02-11 PROCEDURE — 80048 BASIC METABOLIC PNL TOTAL CA: CPT

## 2018-02-11 PROCEDURE — 94640 AIRWAY INHALATION TREATMENT: CPT

## 2018-02-11 PROCEDURE — 6370000000 HC RX 637 (ALT 250 FOR IP): Performed by: NURSE PRACTITIONER

## 2018-02-11 PROCEDURE — 36415 COLL VENOUS BLD VENIPUNCTURE: CPT

## 2018-02-11 PROCEDURE — 94762 N-INVAS EAR/PLS OXIMTRY CONT: CPT

## 2018-02-11 PROCEDURE — 82947 ASSAY GLUCOSE BLOOD QUANT: CPT

## 2018-02-11 PROCEDURE — 85014 HEMATOCRIT: CPT

## 2018-02-11 PROCEDURE — 6370000000 HC RX 637 (ALT 250 FOR IP): Performed by: FAMILY MEDICINE

## 2018-02-11 PROCEDURE — 6360000002 HC RX W HCPCS: Performed by: INTERNAL MEDICINE

## 2018-02-11 PROCEDURE — 36600 WITHDRAWAL OF ARTERIAL BLOOD: CPT

## 2018-02-11 PROCEDURE — 94660 CPAP INITIATION&MGMT: CPT

## 2018-02-11 PROCEDURE — 2580000003 HC RX 258: Performed by: STUDENT IN AN ORGANIZED HEALTH CARE EDUCATION/TRAINING PROGRAM

## 2018-02-11 PROCEDURE — 85018 HEMOGLOBIN: CPT

## 2018-02-11 PROCEDURE — 85027 COMPLETE CBC AUTOMATED: CPT

## 2018-02-11 RX ORDER — PANTOPRAZOLE SODIUM 40 MG/1
40 TABLET, DELAYED RELEASE ORAL
Status: DISCONTINUED | OUTPATIENT
Start: 2018-02-11 | End: 2018-02-11

## 2018-02-11 RX ORDER — PREDNISONE 20 MG/1
20 TABLET ORAL DAILY
Status: DISCONTINUED | OUTPATIENT
Start: 2018-02-12 | End: 2018-02-12

## 2018-02-11 RX ORDER — FAMOTIDINE 20 MG/1
20 TABLET, FILM COATED ORAL 2 TIMES DAILY
Status: DISCONTINUED | OUTPATIENT
Start: 2018-02-11 | End: 2018-02-11

## 2018-02-11 RX ORDER — FAMOTIDINE 20 MG/1
20 TABLET, FILM COATED ORAL DAILY
Status: DISCONTINUED | OUTPATIENT
Start: 2018-02-11 | End: 2018-02-12

## 2018-02-11 RX ADMIN — HYDRALAZINE HYDROCHLORIDE 25 MG: 25 TABLET, FILM COATED ORAL at 08:31

## 2018-02-11 RX ADMIN — OXYCODONE HYDROCHLORIDE 5 MG: 5 TABLET ORAL at 04:42

## 2018-02-11 RX ADMIN — ATORVASTATIN CALCIUM 80 MG: 80 TABLET, FILM COATED ORAL at 20:01

## 2018-02-11 RX ADMIN — DOCUSATE SODIUM 200 MG: 100 CAPSULE, LIQUID FILLED ORAL at 08:33

## 2018-02-11 RX ADMIN — OXYCODONE HYDROCHLORIDE AND ACETAMINOPHEN 1 TABLET: 5; 325 TABLET ORAL at 20:01

## 2018-02-11 RX ADMIN — SODIUM CHLORIDE: 9 INJECTION, SOLUTION INTRAVENOUS at 05:32

## 2018-02-11 RX ADMIN — PANTOPRAZOLE SODIUM 40 MG: 40 TABLET, DELAYED RELEASE ORAL at 08:32

## 2018-02-11 RX ADMIN — IPRATROPIUM BROMIDE AND ALBUTEROL SULFATE 1 AMPULE: .5; 3 SOLUTION RESPIRATORY (INHALATION) at 20:58

## 2018-02-11 RX ADMIN — PREDNISONE 20 MG: 20 TABLET ORAL at 08:33

## 2018-02-11 RX ADMIN — OXYCODONE HYDROCHLORIDE 5 MG: 5 TABLET ORAL at 20:01

## 2018-02-11 RX ADMIN — HYDRALAZINE HYDROCHLORIDE 25 MG: 25 TABLET, FILM COATED ORAL at 15:47

## 2018-02-11 RX ADMIN — ISOSORBIDE MONONITRATE 30 MG: 30 TABLET, EXTENDED RELEASE ORAL at 08:33

## 2018-02-11 RX ADMIN — SODIUM BICARBONATE 650 MG: 650 TABLET, ORALLY DISINTEGRATING ORAL at 20:01

## 2018-02-11 RX ADMIN — SODIUM CHLORIDE: 9 INJECTION, SOLUTION INTRAVENOUS at 19:59

## 2018-02-11 RX ADMIN — SODIUM CHLORIDE 8 MG/HR: 9 INJECTION, SOLUTION INTRAVENOUS at 01:35

## 2018-02-11 RX ADMIN — IPRATROPIUM BROMIDE AND ALBUTEROL SULFATE 1 AMPULE: .5; 3 SOLUTION RESPIRATORY (INHALATION) at 16:08

## 2018-02-11 RX ADMIN — MULTIPLE VITAMINS W/ MINERALS TAB 1 TABLET: TAB at 08:32

## 2018-02-11 RX ADMIN — CARVEDILOL 6.25 MG: 6.25 TABLET, FILM COATED ORAL at 15:47

## 2018-02-11 RX ADMIN — IPRATROPIUM BROMIDE AND ALBUTEROL SULFATE 1 AMPULE: .5; 3 SOLUTION RESPIRATORY (INHALATION) at 07:58

## 2018-02-11 RX ADMIN — SODIUM BICARBONATE 650 MG: 650 TABLET, ORALLY DISINTEGRATING ORAL at 08:32

## 2018-02-11 RX ADMIN — FAMOTIDINE 20 MG: 20 TABLET, FILM COATED ORAL at 20:01

## 2018-02-11 RX ADMIN — HYDRALAZINE HYDROCHLORIDE 25 MG: 25 TABLET, FILM COATED ORAL at 20:01

## 2018-02-11 RX ADMIN — OXYCODONE HYDROCHLORIDE AND ACETAMINOPHEN 1 TABLET: 5; 325 TABLET ORAL at 04:42

## 2018-02-11 RX ADMIN — CARVEDILOL 6.25 MG: 6.25 TABLET, FILM COATED ORAL at 08:32

## 2018-02-11 RX ADMIN — IRON SUCROSE 100 MG: 20 INJECTION, SOLUTION INTRAVENOUS at 09:00

## 2018-02-11 RX ADMIN — Medication 2 PUFF: at 08:33

## 2018-02-11 RX ADMIN — TAMSULOSIN HYDROCHLORIDE 0.4 MG: 0.4 CAPSULE ORAL at 08:32

## 2018-02-11 RX ADMIN — ESCITALOPRAM OXALATE 20 MG: 20 TABLET ORAL at 08:32

## 2018-02-11 RX ADMIN — Medication 2 PUFF: at 20:01

## 2018-02-11 ASSESSMENT — PAIN SCALES - GENERAL
PAINLEVEL_OUTOF10: 7
PAINLEVEL_OUTOF10: 4
PAINLEVEL_OUTOF10: 8

## 2018-02-11 NOTE — PROGRESS NOTES
Bipap on standby at this time. Pulse Ox-98%-3lpm   BS-diminished   Skin score-0    Nasal gel pad available at bedside. Pt awake/alert/no distress noted.  Patient declined bipap RN notified

## 2018-02-11 NOTE — PROGRESS NOTES
Bipap on standby at this time. Pulse Ox-98%-3lpm  BS-diminished    Skin score-0    Nasal gel pad available at bedside. Pt awake/alert/no distress noted.

## 2018-02-11 NOTE — PROGRESS NOTES
tamsulosin (FLOMAX) capsule 0.4 mg Daily   therapeutic multivitamin-minerals 1 tablet Daily   diazepam (VALIUM) tablet 5 mg Q12H PRN   atorvastatin (LIPITOR) tablet 80 mg Nightly   sodium bicarbonate tablet 650 mg BID   insulin lispro (HUMALOG) injection vial 0-12 Units TID WC   insulin lispro (HUMALOG) injection vial 0-6 Units Nightly   glucose (GLUTOSE) 40 % oral gel 15 g PRN   dextrose 50 % solution 12.5 g PRN   glucagon (rDNA) injection 1 mg PRN   dextrose 5 % solution PRN   ipratropium-albuterol (DUONEB) nebulizer solution 1 ampule As Directed RT PRN   0.9 % sodium chloride bolus Once   sodium chloride flush 0.9 % injection 10 mL Q12H   sodium chloride flush 0.9 % injection 10 mL PRN       Data:     Code Status:  Full Code    No family history on file. Social History     Social History    Marital status:      Spouse name: N/A    Number of children: N/A    Years of education: N/A     Occupational History    Not on file. Social History Main Topics    Smoking status: Current Every Day Smoker     Packs/day: 2.00     Types: Cigarettes    Smokeless tobacco: Not on file    Alcohol use No    Drug use: No    Sexual activity: Not on file     Other Topics Concern    Not on file     Social History Narrative    No narrative on file       Vitals:  BP (!) 129/57   Pulse 70   Temp 98.1 °F (36.7 °C) (Oral)   Resp 18   Ht 5' 8\" (1.727 m)   Wt 149 lb 7.6 oz (67.8 kg)   SpO2 90%   BMI 22.73 kg/m²   Temp (24hrs), Av.8 °F (36.6 °C), Min:97.3 °F (36.3 °C), Max:98.3 °F (36.8 °C)    Recent Labs      02/10/18   1632  02/10/18   2056  18   0728  18   1129   POCGLU  94  146*  96  170*       I/O (24Hr):     Intake/Output Summary (Last 24 hours) at 18 1617  Last data filed at 18 0551   Gross per 24 hour   Intake          1426.32 ml   Output             1050 ml   Net           376.32 ml       Labs:      CBC:   Lab Results   Component Value Date    WBC 6.1 2018    RBC 2.63 02/11/2018    HGB 8.3 02/11/2018    HCT 25.8 02/11/2018    MCV 98.4 02/11/2018    MCH 31.5 02/11/2018    MCHC 32.0 02/11/2018    RDW 18.6 02/11/2018     02/11/2018    MPV 7.3 02/11/2018     CBC with Differential:    Lab Results   Component Value Date    WBC 6.1 02/11/2018    RBC 2.63 02/11/2018    HGB 8.3 02/11/2018    HCT 25.8 02/11/2018     02/11/2018    MCV 98.4 02/11/2018    MCH 31.5 02/11/2018    MCHC 32.0 02/11/2018    RDW 18.6 02/11/2018    LYMPHOPCT 12 02/05/2018    MONOPCT 13 02/05/2018    BASOPCT 1 02/05/2018    MONOSABS 0.80 02/05/2018    LYMPHSABS 0.80 02/05/2018    EOSABS 0.00 02/05/2018    BASOSABS 0.10 02/05/2018    DIFFTYPE NOT REPORTED 02/05/2018     Hemoglobin/Hematocrit:    Lab Results   Component Value Date    HGB 8.3 02/11/2018    HCT 25.8 02/11/2018     CMP:    Lab Results   Component Value Date     02/11/2018    K 4.0 02/11/2018    CL 96 02/11/2018    CO2 30 02/11/2018    BUN 96 02/11/2018    CREATININE 4.21 02/11/2018    GFRAA 17 02/11/2018    LABGLOM 14 02/11/2018    GLUCOSE 118 02/11/2018    GLUCOSE 86 03/27/2012    PROT 6.0 02/07/2018    LABALBU 2.7 02/06/2018    CALCIUM 8.0 02/11/2018    BILITOT <0.15 02/06/2018    ALKPHOS 57 02/06/2018    AST 13 02/06/2018    ALT 14 02/06/2018     BMP:    Lab Results   Component Value Date     02/11/2018    K 4.0 02/11/2018    CL 96 02/11/2018    CO2 30 02/11/2018    BUN 96 02/11/2018    LABALBU 2.7 02/06/2018    CREATININE 4.21 02/11/2018    CALCIUM 8.0 02/11/2018    GFRAA 17 02/11/2018    LABGLOM 14 02/11/2018    GLUCOSE 118 02/11/2018    GLUCOSE 86 03/27/2012     PT/INR:  No results found for: PROTIME, INR  PTT:  No results found for: APTT, PTT[APTT}    Physical Examination:        General appearance: alert, cooperative mild resp distress  Mental Status: oriented to person, place and time and anxious affect    Abdomen: soft, nontender, nondistended, bowel sounds present large hernias       Assessment:        Primary Problem  Acute on chronic respiratory failure with hypercapnia [facility])     Active Hospital Problems    Diagnosis Date Noted    Gastrointestinal hemorrhage [K92.2]     Abdominal wall hernia [K43.9]     Absolute anemia [D64.9]     Acute on chronic respiratory failure with hypercapnia ([facility] [J96.22] 02/08/2018    Acute on chronic systolic heart failure (HCC) [I50.23] 02/08/2018    Drug-induced hyperglycemia [R73.9, T50.905A] 02/08/2018    Anemia [D64.9]     Abdominal aortic aneurysm (AAA) ([facility] [I71.4] 02/06/2018    COPD exacerbation ([facility]) [J44.1] 02/05/2018    Type 2 diabetes mellitus without complication, with long-term current use of insulin ([facility]) [E11.9, Z79.4]     Chronic kidney disease [N18.9]      Past Medical History:   Diagnosis Date    Abdominal pain, chronic, generalized     Cervicodynia     Chronic back pain     Chronic kidney disease     secondary to renal vascular disease    Cigarette smoker     COPD (chronic obstructive pulmonary disease) ([facility]     DDD (degenerative disc disease), lumbar     Depression     Family history of cancer     Hernia, ventral     Hyperlipidemia     Iron deficiency anemia     Lichen simplex chronicus     PUD (peptic ulcer disease)     Renal cyst     Type II or unspecified type diabetes mellitus without mention of complication, not stated as uncontrolled [facility]     Vitamin D deficiency         Plan:        1. Patient would need EGD colonoscopy , but that could be done electively as he not actively bleeding at this time         when his being creatinine normalized and he can tolerate prepas he not bleeding  2. F/u HGB  3. Will d/c PPI, start Zantac ( because of renal failure)  4.  Rx for Pul issues/nephrology      Explained to the patient and d/W Nursing Staff  Will F/U with you  Please call or Page for any issues or change in status  Thanks    Electronically signed by Mark Brito MD on 2/11/2018 at 4:17 PM

## 2018-02-11 NOTE — PROGRESS NOTES
Problem: Breathing Pattern - Ineffective:  Goal: Ability to achieve and maintain a regular respiratory rate will improve  Ability to achieve and maintain a regular respiratory rate will improve   Outcome: Ongoing  Patient exhibiting no S/S of respiratory distress     Problem: Falls - Risk of  Goal: Absence of falls  Outcome: Ongoing  Patient safety maintained.  Bed locked and lowest position.  Call light within reach. Hourly rounding performed. No falls or injuries.  Will continue to monitor.      Problem: Pain:  Goal: Pain level will decrease  Pain level will decrease   Outcome: Ongoing  Patient resting comfortably this shift with no acute pain

## 2018-02-11 NOTE — PROGRESS NOTES
[facility]    Progress Note    2/11/2018    10:50 AM    Name:   Dona Calloway  MRN:     [id number]     [place]:      [de-identified]   Room:   [facility] Day:  6  Admit Date:  2/5/2018  7:29 PM    PCP:   Clarence Vincent MD  Code Status:  Full Code    Subjective:     C/C:   Chief Complaint   Patient presents with    Shortness of Breath     Interval History Status: not changed. Patient found watching TV in bed. No issues overnight. Repeat ABG done this AM, showing slight worsening of Respiratory status. Pt has been wearing his BiPAP less frequently, which may explain his ABG. No Bowel ovement overnight, but little concern for continued GI Bleed. Lasix held overnight due to worsening BUN/Cr. Nephro to provide gentle IV hydration. Cardiology sign off for him. Awaiting Tunneled catheter placement on Monday for HD. Plan is to go home with VNS after HD complete. Brief History:        The patient is a 75 y.o. Non-/non  male wit hPMHx of COPD on 3 LPM, Systolic CHF, and CKD baseline Cr of 2.90 who presents with Shortness of Breath. Patient was at [facility] on 2/1 for similar complaint, and was given Levaquin and medrol dose pack PO. At the time, he had a productive cough, and chest pain with coughing. He had no improvement in his Sx, and came to ED for revaluation. In the ED, his O2 sat was at 85% on 3 LPM. He was given 4 LPM by Venturi mask. Pulmonology was consulted. CXR showed small B/L Pleural effusions and R sided hilar density. His Cr was also elevated at 3.70, so nephrology was consulted. Hgb was 7.0 in ED and he was given 1 U PRBC. Pt denied SANJAY, but reports possible bloody stools. He was given IV solumedrol bolus, and admitted to ICU intermediate   for the management of  Acute on Chronic Respiratory Failure        Review of Systems:     ROS    Constitutional: Negative for chills and fever.    Respiratory: Negative for cough and shortness of breath.    Cardiovascular: Negative for chest pain and palpitations. Gastrointestinal: Negative for Abdominal Pain, Positive for constipation. Negative for diarrhea, nausea and vomiting. Genitourinary: Negative for dysuria. Neurological: Negative for dizziness, tingling and headaches.        Medications: Allergies: Allergies   Allergen Reactions    Codeine     Contrast [Iodides]      IVP dyes       Current Meds:   Scheduled Meds:    pantoprazole  40 mg Oral QAM AC    docusate sodium  200 mg Oral Daily    predniSONE  20 mg Oral BID    isosorbide mononitrate  30 mg Oral Daily    iron sucrose  100 mg Intravenous Q24H    hydrALAZINE  25 mg Oral TID    darbepoetin trang-polysorbate  25 mcg Subcutaneous Weekly - Thursday    sodium chloride flush  10 mL Intravenous 2 times per day    ipratropium-albuterol  1 ampule Inhalation Q4H WA    carvedilol  6.25 mg Oral BID     escitalopram  20 mg Oral Daily    mometasone-formoterol  2 puff Inhalation BID    tamsulosin  0.4 mg Oral Daily    therapeutic multivitamin-minerals  1 tablet Oral Daily    atorvastatin  80 mg Oral Nightly    sodium bicarbonate  650 mg Oral BID    insulin lispro  0-12 Units Subcutaneous TID     insulin lispro  0-6 Units Subcutaneous Nightly    sodium chloride  250 mL Intravenous Once    sodium chloride flush  10 mL Intravenous Q12H     Continuous Infusions:    sodium chloride 75 mL/hr at 02/11/18 0532    dextrose       PRN Meds: oxyCODONE-acetaminophen **AND** oxyCODONE, hydrALAZINE, sodium chloride flush, acetaminophen, magnesium hydroxide, bisacodyl, ondansetron, nicotine, albuterol, diazepam, glucose, dextrose, glucagon (rDNA), dextrose, ipratropium-albuterol, sodium chloride flush    Data:     Past Medical History:   has a past medical history of Abdominal pain, chronic, generalized; Cervicodynia;  Chronic back pain; Chronic kidney disease; Cigarette smoker; COPD (chronic obstructive pulmonary QD     Hyperglycemia 2/2 steroids   - POCT glucose    - ISS low dose     Multiple nodules on CT chest   - Past smoker, 1-2 PPD for 40 years   - 2 prior pulmonary nodules with mediastinal lymphadenopathy found Jan 2016; interval increased size of left middle lobe density   - F/u outpatient pulmonology     Abdominal Pain, Chronic 15 year hx, improved   - Home PO Percocet 5-325 mg for pain 4-6, or Percocet  mg for pain     Merlyn Matias MD  2/11/2018  10:50 AM     I have discussed the care of Beaida Ibarra , including pertinent history and exam findings,    today with the resident. I have seen and examined the patient and the key elements of all parts of the encounter have been performed by me . I agree with the assessment, plan and orders as documented by the resident. Principal Problem:    Acute on chronic respiratory failure with hypercapnia (HCC)  Active Problems:    Type 2 diabetes mellitus without complication, with long-term current use of insulin (HCC)    Chronic kidney disease    COPD exacerbation (HCC)    Abdominal aortic aneurysm (AAA) (HCC)    Acute on chronic systolic heart failure (HCC)    Drug-induced hyperglycemia    Anemia    Gastrointestinal hemorrhage    Abdominal wall hernia    Absolute anemia  Resolved Problems:    * No resolved hospital problems.  *       Electronically signed by Sanjeev Mckenna MD

## 2018-02-11 NOTE — PROGRESS NOTES
Pulmonary Progress Note  [facility]      Patient - Mike Pineda,  Age - 75 y.o.    - 1950      Room Number - 0409/7187-35   N -  [id number]   [facility] # - [de-identified]  Date of Admission -  2018  7:29 PM    Follow-up: Acute exacerbation of COPD    Consulting Rod Oliveira MD  Primary Care Physician - Gadiel Solares MD     SUBJECTIVE   Feels fine today, denies any fever or chills  No chest pain, short of breath cough or wheezing  He refused to use the BiPAP last night   Denies any nausea vomiting or diarrhea  On 2 L O2  OBJECTIVE   VITALS    height is 5' 8\" (1.727 m) and weight is 149 lb 7.6 oz (67.8 kg). His oral temperature is 98.1 °F (36.7 °C). His blood pressure is 129/57 (abnormal) and his pulse is 70. His respiration is 18 and oxygen saturation is 90%. Body mass index is 22.73 kg/m². Temperature Range: Temp: 98.1 °F (36.7 °C) Temp  Av.8 °F (36.6 °C)  Min: 97.3 °F (36.3 °C)  Max: 98.3 °F (36.8 °C)  BP Range:  Systolic (47JPG), DGI:360 , Min:122 , FRF:794     Diastolic (28SSJ), DXR:03, Min:53, Max:68    Pulse Range: Pulse  Av  Min: 63  Max: 70  Respiration Range: Resp  Av.3  Min: 14  Max: 18  Current Pulse Ox[de-identified]  SpO2: 90 %  24HR Pulse Ox Range:  SpO2  Av %  Min: 90 %  Max: 100 %  Oxygen Amount and Delivery: O2 Flow Rate (L/min): 2 L/min    Wt Readings from Last 3 Encounters:   18 149 lb 7.6 oz (67.8 kg)   16 133 lb (60.3 kg)   11/23/15 134 lb 3.2 oz (60.9 kg)       I/O (24 Hours)    Intake/Output Summary (Last 24 hours) at 18 1535  Last data filed at 18 0551   Gross per 24 hour   Intake          1426.32 ml   Output             1050 ml   Net           376.32 ml       EXAM     General Appearance  Awake, alert, in no acute distress  HEENT - normocephalic, atraumatic.    Neck - Supple,  trachea midline , No JVD  Lungs - coarse , No wheezing, no distress, Decreased 7.255 02/11/2018    PO2ART 76.0 02/11/2018    FSK2ICG 68.0 02/11/2018      Lab Results   Component Value Date    MODE 3 LM NC 02/11/2018     Ionized Calcium:  No results found for: IONCA  Magnesium:    Lab Results   Component Value Date    MG 2.2 02/06/2018      Phosphorus:    Lab Results   Component Value Date    PHOS 6.6 02/06/2018        LIVER PROFILE   No results for input(s): AST, ALT, LIPASE, BILIDIR, BILITOT, ALKPHOS in the last 72 hours. Invalid input(s): AMYLASE,  ALB  INR No results for input(s): INR in the last 72 hours. PTT No results for input(s): APTT in the last 72 hours. BNP No results for input(s): BNP in the last 72 hours. RADIOLOGY     (See actual reports for details)    ASSESSMENT/PLAN     1. Acute exacerbation of COPD, possibly secondary to CHF. 2.  Bronchospasm/Improved  3. Chronic respiratory failure with hypoxia, on home O2 at 3 liters. 4.  Hypercapnia /respiratory acidosis PCO2 of 68  5.   bilateral lung nodules on CT chest   6. Anemia, acute on chronic. 7.  Chronic kidney disease. 8.  Diabetes. 9.  Tobacco abuse up to 2 packs per day for 40 years, quit 3 weeks ago. PLAN OF TREATMENT:  Discussed with patient the need to use BiPAP for 10-12 hours a day we'll follow-up his ABGs  aerosol treatment, Acapella  Decrease prednisone to daily. Normal charla on sputum cultures     O2, diuresis per renal, looks patient is going to have a tunneled catheter for hemodialysis.    Anemia workup, GI following  Needs PET scan as an outpatient  Follow-up ABGs, continue BiPAP   Anticoagulation held because of dropping hemoglobin and possible GI bleed    Electronically signed by Ana Maria Barksdale MD on 2/11/2018 at 3:35 PM

## 2018-02-12 ENCOUNTER — APPOINTMENT (OUTPATIENT)
Dept: GENERAL RADIOLOGY | Age: 68
DRG: 673 | End: 2018-02-12
Payer: COMMERCIAL

## 2018-02-12 LAB
-: ABNORMAL
ALLEN TEST: NORMAL
ALLEN TEST: NORMAL
AMORPHOUS: ABNORMAL
ANION GAP SERPL CALCULATED.3IONS-SCNC: 10 MMOL/L (ref 9–17)
BACTERIA: ABNORMAL
BILIRUBIN URINE: NEGATIVE
BUN BLDV-MCNC: 94 MG/DL (ref 8–23)
BUN/CREAT BLD: ABNORMAL (ref 9–20)
C-REACTIVE PROTEIN: 41.7 MG/L (ref 0–5)
CALCIUM SERPL-MCNC: 8.3 MG/DL (ref 8.6–10.4)
CARBOXYHEMOGLOBIN: 1.1 %
CARBOXYHEMOGLOBIN: 1.3 %
CASTS UA: ABNORMAL /LPF
CHLORIDE BLD-SCNC: 101 MMOL/L (ref 98–107)
CO2: 28 MMOL/L (ref 20–31)
COLOR: YELLOW
COMMENT UA: ABNORMAL
CREAT SERPL-MCNC: 3.88 MG/DL (ref 0.7–1.2)
CRYSTALS, UA: ABNORMAL /HPF
DIRECT EXAM: NORMAL
EPITHELIAL CELLS UA: ABNORMAL /HPF
FIO2: 40
FIO2: 60
GFR AFRICAN AMERICAN: 19 ML/MIN
GFR NON-AFRICAN AMERICAN: 16 ML/MIN
GFR SERPL CREATININE-BSD FRML MDRD: ABNORMAL ML/MIN/{1.73_M2}
GFR SERPL CREATININE-BSD FRML MDRD: ABNORMAL ML/MIN/{1.73_M2}
GLUCOSE BLD-MCNC: 119 MG/DL (ref 75–110)
GLUCOSE BLD-MCNC: 129 MG/DL (ref 75–110)
GLUCOSE BLD-MCNC: 71 MG/DL (ref 75–110)
GLUCOSE BLD-MCNC: 81 MG/DL (ref 75–110)
GLUCOSE BLD-MCNC: 83 MG/DL (ref 70–99)
GLUCOSE URINE: NEGATIVE
HCO3 ARTERIAL: 29.3 MMOL/L
HCO3 ARTERIAL: 29.5 MMOL/L
HCT VFR BLD CALC: 28.2 % (ref 41–53)
HCT VFR BLD CALC: 29.7 % (ref 41–53)
HEMOGLOBIN: 8.6 G/DL (ref 13.5–17.5)
HEMOGLOBIN: 9.3 G/DL (ref 13.5–17.5)
HEPATITIS B CORE TOTAL ANTIBODY: NONREACTIVE
HEPATITIS B SURFACE ANTIGEN: NONREACTIVE
HEPATITIS C ANTIBODY: NONREACTIVE
KETONES, URINE: NEGATIVE
LEUKOCYTE ESTERASE, URINE: ABNORMAL
Lab: NORMAL
MCH RBC QN AUTO: 31.1 PG (ref 26–34)
MCHC RBC AUTO-ENTMCNC: 31.3 G/DL (ref 31–37)
MCV RBC AUTO: 99.5 FL (ref 80–100)
METHEMOGLOBIN: 0.7 %
METHEMOGLOBIN: 1.2 %
MODE: NORMAL
MODE: NORMAL
MUCUS: ABNORMAL
NEGATIVE BASE EXCESS, ART: NORMAL MMOL/L (ref 0–2)
NEGATIVE BASE EXCESS, ART: NORMAL MMOL/L (ref 0–2)
NITRITE, URINE: NEGATIVE
NOTIFICATION TIME: NORMAL
NOTIFICATION TIME: NORMAL
NOTIFICATION: NORMAL
NOTIFICATION: NORMAL
NRBC AUTOMATED: ABNORMAL PER 100 WBC
O2 DEVICE/FLOW/%: NORMAL
O2 DEVICE/FLOW/%: NORMAL
O2 SAT, ARTERIAL: 86.4 %
O2 SAT, ARTERIAL: 88.3 %
OTHER OBSERVATIONS UA: ABNORMAL
OXYHEMOGLOBIN: NORMAL % (ref 95–98)
OXYHEMOGLOBIN: NORMAL % (ref 95–98)
PATIENT TEMP: 37
PATIENT TEMP: 37
PCO2 ARTERIAL: 73.1 MMHG
PCO2 ARTERIAL: 78.7 MMHG
PCO2, ART, TEMP ADJ: NORMAL
PCO2, ART, TEMP ADJ: NORMAL
PDW BLD-RTO: 18.1 % (ref 11.5–14.9)
PEEP/CPAP: 8
PEEP/CPAP: NORMAL
PH ARTERIAL: 7.18
PH ARTERIAL: 7.21
PH UA: 5 (ref 5–8)
PH, ART, TEMP ADJ: NORMAL
PH, ART, TEMP ADJ: NORMAL
PLATELET # BLD: 169 K/UL (ref 150–450)
PMV BLD AUTO: 7.2 FL (ref 6–12)
PO2 ARTERIAL: 60 MMHG
PO2 ARTERIAL: 60.3 MMHG
PO2, ART, TEMP ADJ: NORMAL MMHG
PO2, ART, TEMP ADJ: NORMAL MMHG
POSITIVE BASE EXCESS, ART: 0.9 MMOL/L (ref 0–2)
POSITIVE BASE EXCESS, ART: 1.7 MMOL/L (ref 0–2)
POTASSIUM SERPL-SCNC: 4 MMOL/L (ref 3.7–5.3)
PROCALCITONIN: 1.07 NG/ML
PROTEIN UA: ABNORMAL
PSV: NORMAL
PSV: NORMAL
PT. POSITION: NORMAL
PT. POSITION: NORMAL
RBC # BLD: 2.99 M/UL (ref 4.5–5.9)
RBC UA: ABNORMAL /HPF
RENAL EPITHELIAL, UA: ABNORMAL /HPF
RESPIRATORY RATE: 19
RESPIRATORY RATE: 22
SAMPLE SITE: NORMAL
SAMPLE SITE: NORMAL
SET RATE: 18
SET RATE: NORMAL
SODIUM BLD-SCNC: 139 MMOL/L (ref 135–144)
SPECIFIC GRAVITY UA: 1.02 (ref 1–1.03)
SPECIMEN DESCRIPTION: NORMAL
SPECIMEN DESCRIPTION: NORMAL
STATUS: NORMAL
TEXT FOR RESPIRATORY: NORMAL
TEXT FOR RESPIRATORY: NORMAL
TOTAL HB: NORMAL G/DL (ref 12–16)
TOTAL HB: NORMAL G/DL (ref 12–16)
TOTAL RATE: 22
TOTAL RATE: NORMAL
TRICHOMONAS: ABNORMAL
TURBIDITY: ABNORMAL
URINE HGB: NEGATIVE
UROBILINOGEN, URINE: NORMAL
VT: NORMAL
VT: NORMAL
WBC # BLD: 14 K/UL (ref 3.5–11)
WBC UA: ABNORMAL /HPF
YEAST: ABNORMAL

## 2018-02-12 PROCEDURE — 90937 HEMODIALYSIS REPEATED EVAL: CPT

## 2018-02-12 PROCEDURE — 94660 CPAP INITIATION&MGMT: CPT

## 2018-02-12 PROCEDURE — 86140 C-REACTIVE PROTEIN: CPT

## 2018-02-12 PROCEDURE — 82805 BLOOD GASES W/O2 SATURATION: CPT

## 2018-02-12 PROCEDURE — 36415 COLL VENOUS BLD VENIPUNCTURE: CPT

## 2018-02-12 PROCEDURE — 2580000003 HC RX 258: Performed by: INTERNAL MEDICINE

## 2018-02-12 PROCEDURE — 85018 HEMOGLOBIN: CPT

## 2018-02-12 PROCEDURE — 99232 SBSQ HOSP IP/OBS MODERATE 35: CPT | Performed by: INTERNAL MEDICINE

## 2018-02-12 PROCEDURE — 6360000002 HC RX W HCPCS: Performed by: STUDENT IN AN ORGANIZED HEALTH CARE EDUCATION/TRAINING PROGRAM

## 2018-02-12 PROCEDURE — 82947 ASSAY GLUCOSE BLOOD QUANT: CPT

## 2018-02-12 PROCEDURE — 87804 INFLUENZA ASSAY W/OPTIC: CPT

## 2018-02-12 PROCEDURE — 81001 URINALYSIS AUTO W/SCOPE: CPT

## 2018-02-12 PROCEDURE — 71045 X-RAY EXAM CHEST 1 VIEW: CPT

## 2018-02-12 PROCEDURE — 80048 BASIC METABOLIC PNL TOTAL CA: CPT

## 2018-02-12 PROCEDURE — S0028 INJECTION, FAMOTIDINE, 20 MG: HCPCS | Performed by: STUDENT IN AN ORGANIZED HEALTH CARE EDUCATION/TRAINING PROGRAM

## 2018-02-12 PROCEDURE — 02HV33Z INSERTION OF INFUSION DEVICE INTO SUPERIOR VENA CAVA, PERCUTANEOUS APPROACH: ICD-10-PCS | Performed by: FAMILY MEDICINE

## 2018-02-12 PROCEDURE — 94640 AIRWAY INHALATION TREATMENT: CPT

## 2018-02-12 PROCEDURE — 87086 URINE CULTURE/COLONY COUNT: CPT

## 2018-02-12 PROCEDURE — 6360000002 HC RX W HCPCS: Performed by: INTERNAL MEDICINE

## 2018-02-12 PROCEDURE — 6370000000 HC RX 637 (ALT 250 FOR IP): Performed by: NURSE PRACTITIONER

## 2018-02-12 PROCEDURE — 36600 WITHDRAWAL OF ARTERIAL BLOOD: CPT

## 2018-02-12 PROCEDURE — 87340 HEPATITIS B SURFACE AG IA: CPT

## 2018-02-12 PROCEDURE — 2000000000 HC ICU R&B

## 2018-02-12 PROCEDURE — 86704 HEP B CORE ANTIBODY TOTAL: CPT

## 2018-02-12 PROCEDURE — 85027 COMPLETE CBC AUTOMATED: CPT

## 2018-02-12 PROCEDURE — 87186 SC STD MICRODIL/AGAR DIL: CPT

## 2018-02-12 PROCEDURE — 87077 CULTURE AEROBIC IDENTIFY: CPT

## 2018-02-12 PROCEDURE — 99233 SBSQ HOSP IP/OBS HIGH 50: CPT | Performed by: INTERNAL MEDICINE

## 2018-02-12 PROCEDURE — 73552 X-RAY EXAM OF FEMUR 2/>: CPT

## 2018-02-12 PROCEDURE — 87040 BLOOD CULTURE FOR BACTERIA: CPT

## 2018-02-12 PROCEDURE — 6360000002 HC RX W HCPCS: Performed by: FAMILY MEDICINE

## 2018-02-12 PROCEDURE — 2580000003 HC RX 258: Performed by: NURSE PRACTITIONER

## 2018-02-12 PROCEDURE — 85014 HEMATOCRIT: CPT

## 2018-02-12 PROCEDURE — 86803 HEPATITIS C AB TEST: CPT

## 2018-02-12 PROCEDURE — 86403 PARTICLE AGGLUT ANTBDY SCRN: CPT

## 2018-02-12 PROCEDURE — 94762 N-INVAS EAR/PLS OXIMTRY CONT: CPT

## 2018-02-12 PROCEDURE — 6370000000 HC RX 637 (ALT 250 FOR IP): Performed by: STUDENT IN AN ORGANIZED HEALTH CARE EDUCATION/TRAINING PROGRAM

## 2018-02-12 PROCEDURE — 6370000000 HC RX 637 (ALT 250 FOR IP): Performed by: INTERNAL MEDICINE

## 2018-02-12 PROCEDURE — 5A1D70Z PERFORMANCE OF URINARY FILTRATION, INTERMITTENT, LESS THAN 6 HOURS PER DAY: ICD-10-PCS | Performed by: INTERNAL MEDICINE

## 2018-02-12 PROCEDURE — 2580000003 HC RX 258: Performed by: STUDENT IN AN ORGANIZED HEALTH CARE EDUCATION/TRAINING PROGRAM

## 2018-02-12 PROCEDURE — 84145 PROCALCITONIN (PCT): CPT

## 2018-02-12 PROCEDURE — 2500000003 HC RX 250 WO HCPCS: Performed by: STUDENT IN AN ORGANIZED HEALTH CARE EDUCATION/TRAINING PROGRAM

## 2018-02-12 RX ORDER — DEXTROSE AND SODIUM CHLORIDE 5; .45 G/100ML; G/100ML
INJECTION, SOLUTION INTRAVENOUS CONTINUOUS
Status: DISCONTINUED | OUTPATIENT
Start: 2018-02-12 | End: 2018-02-13

## 2018-02-12 RX ORDER — HYDRALAZINE HYDROCHLORIDE 20 MG/ML
10 INJECTION INTRAMUSCULAR; INTRAVENOUS EVERY 4 HOURS PRN
Status: DISCONTINUED | OUTPATIENT
Start: 2018-02-12 | End: 2018-02-27 | Stop reason: HOSPADM

## 2018-02-12 RX ORDER — LEVOFLOXACIN 5 MG/ML
500 INJECTION, SOLUTION INTRAVENOUS EVERY 24 HOURS
Status: DISCONTINUED | OUTPATIENT
Start: 2018-02-12 | End: 2018-02-12

## 2018-02-12 RX ORDER — METHYLPREDNISOLONE SODIUM SUCCINATE 40 MG/ML
10 INJECTION, POWDER, LYOPHILIZED, FOR SOLUTION INTRAMUSCULAR; INTRAVENOUS EVERY 12 HOURS
Status: DISCONTINUED | OUTPATIENT
Start: 2018-02-12 | End: 2018-02-12

## 2018-02-12 RX ORDER — HEPARIN SODIUM 1000 [USP'U]/ML
1200 INJECTION, SOLUTION INTRAVENOUS; SUBCUTANEOUS PRN
Status: DISCONTINUED | OUTPATIENT
Start: 2018-02-12 | End: 2018-02-19

## 2018-02-12 RX ORDER — LEVOFLOXACIN 5 MG/ML
750 INJECTION, SOLUTION INTRAVENOUS ONCE
Status: COMPLETED | OUTPATIENT
Start: 2018-02-12 | End: 2018-02-12

## 2018-02-12 RX ORDER — DEXTROSE MONOHYDRATE 25 G/50ML
12.5 INJECTION, SOLUTION INTRAVENOUS ONCE
Status: COMPLETED | OUTPATIENT
Start: 2018-02-12 | End: 2018-02-12

## 2018-02-12 RX ORDER — HEPARIN SODIUM 5000 [USP'U]/ML
5000 INJECTION, SOLUTION INTRAVENOUS; SUBCUTANEOUS EVERY 8 HOURS SCHEDULED
Status: DISCONTINUED | OUTPATIENT
Start: 2018-02-12 | End: 2018-02-15

## 2018-02-12 RX ORDER — HEPARIN SODIUM 5000 [USP'U]/ML
10000 INJECTION, SOLUTION INTRAVENOUS; SUBCUTANEOUS ONCE
Status: DISCONTINUED | OUTPATIENT
Start: 2018-02-12 | End: 2018-02-27 | Stop reason: HOSPADM

## 2018-02-12 RX ORDER — CARVEDILOL 6.25 MG/1
6.25 TABLET ORAL 2 TIMES DAILY WITH MEALS
Status: DISCONTINUED | OUTPATIENT
Start: 2018-02-12 | End: 2018-02-12

## 2018-02-12 RX ORDER — METOPROLOL TARTRATE 5 MG/5ML
2.5 INJECTION INTRAVENOUS EVERY 6 HOURS
Status: DISCONTINUED | OUTPATIENT
Start: 2018-02-12 | End: 2018-02-13

## 2018-02-12 RX ORDER — METHYLPREDNISOLONE SODIUM SUCCINATE 40 MG/ML
40 INJECTION, POWDER, LYOPHILIZED, FOR SOLUTION INTRAMUSCULAR; INTRAVENOUS EVERY 6 HOURS
Status: DISCONTINUED | OUTPATIENT
Start: 2018-02-12 | End: 2018-02-16

## 2018-02-12 RX ORDER — LEVOFLOXACIN 5 MG/ML
500 INJECTION, SOLUTION INTRAVENOUS
Status: DISCONTINUED | OUTPATIENT
Start: 2018-02-14 | End: 2018-02-16

## 2018-02-12 RX ORDER — METHYLPREDNISOLONE SODIUM SUCCINATE 40 MG/ML
40 INJECTION, POWDER, LYOPHILIZED, FOR SOLUTION INTRAMUSCULAR; INTRAVENOUS EVERY 8 HOURS
Status: DISCONTINUED | OUTPATIENT
Start: 2018-02-12 | End: 2018-02-12

## 2018-02-12 RX ADMIN — SODIUM CHLORIDE: 9 INJECTION, SOLUTION INTRAVENOUS at 10:00

## 2018-02-12 RX ADMIN — IRON SUCROSE 100 MG: 20 INJECTION, SOLUTION INTRAVENOUS at 17:21

## 2018-02-12 RX ADMIN — Medication 2 PUFF: at 20:17

## 2018-02-12 RX ADMIN — METOPROLOL TARTRATE 2.5 MG: 5 INJECTION, SOLUTION INTRAVENOUS at 22:20

## 2018-02-12 RX ADMIN — DEXTROSE AND SODIUM CHLORIDE: 5; 450 INJECTION, SOLUTION INTRAVENOUS at 15:49

## 2018-02-12 RX ADMIN — IPRATROPIUM BROMIDE AND ALBUTEROL SULFATE 1 AMPULE: .5; 3 SOLUTION RESPIRATORY (INHALATION) at 14:47

## 2018-02-12 RX ADMIN — IPRATROPIUM BROMIDE AND ALBUTEROL SULFATE 1 AMPULE: .5; 3 SOLUTION RESPIRATORY (INHALATION) at 11:21

## 2018-02-12 RX ADMIN — Medication 10 ML: at 10:00

## 2018-02-12 RX ADMIN — HEPARIN SODIUM 5000 UNITS: 5000 INJECTION, SOLUTION INTRAVENOUS; SUBCUTANEOUS at 15:49

## 2018-02-12 RX ADMIN — FAMOTIDINE 10 MG: 10 INJECTION, SOLUTION INTRAVENOUS at 10:26

## 2018-02-12 RX ADMIN — IPRATROPIUM BROMIDE AND ALBUTEROL SULFATE 1 AMPULE: .5; 3 SOLUTION RESPIRATORY (INHALATION) at 07:27

## 2018-02-12 RX ADMIN — METHYLPREDNISOLONE SODIUM SUCCINATE 10 MG: 40 INJECTION, POWDER, FOR SOLUTION INTRAMUSCULAR; INTRAVENOUS at 10:27

## 2018-02-12 RX ADMIN — HEPARIN SODIUM 1200 UNITS: 1000 INJECTION, SOLUTION INTRAVENOUS; SUBCUTANEOUS at 14:34

## 2018-02-12 RX ADMIN — DEXTROSE MONOHYDRATE 12.5 G: 25 INJECTION, SOLUTION INTRAVENOUS at 12:30

## 2018-02-12 RX ADMIN — Medication 10 ML: at 20:17

## 2018-02-12 RX ADMIN — METHYLPREDNISOLONE SODIUM SUCCINATE 40 MG: 40 INJECTION, POWDER, FOR SOLUTION INTRAMUSCULAR; INTRAVENOUS at 22:20

## 2018-02-12 RX ADMIN — IPRATROPIUM BROMIDE AND ALBUTEROL SULFATE 1 AMPULE: .5; 3 SOLUTION RESPIRATORY (INHALATION) at 19:43

## 2018-02-12 RX ADMIN — OXYCODONE HYDROCHLORIDE 5 MG: 5 TABLET ORAL at 20:42

## 2018-02-12 RX ADMIN — HEPARIN SODIUM 5000 UNITS: 5000 INJECTION, SOLUTION INTRAVENOUS; SUBCUTANEOUS at 22:11

## 2018-02-12 RX ADMIN — SODIUM CHLORIDE: 9 INJECTION, SOLUTION INTRAVENOUS at 07:56

## 2018-02-12 RX ADMIN — DIAZEPAM 5 MG: 5 TABLET ORAL at 22:11

## 2018-02-12 RX ADMIN — Medication 10 ML: at 10:26

## 2018-02-12 RX ADMIN — METHYLPREDNISOLONE SODIUM SUCCINATE 40 MG: 40 INJECTION, POWDER, FOR SOLUTION INTRAMUSCULAR; INTRAVENOUS at 17:05

## 2018-02-12 RX ADMIN — VANCOMYCIN HYDROCHLORIDE 1000 MG: 1 INJECTION, POWDER, LYOPHILIZED, FOR SOLUTION INTRAVENOUS at 16:04

## 2018-02-12 RX ADMIN — LEVOFLOXACIN 750 MG: 5 INJECTION, SOLUTION INTRAVENOUS at 15:43

## 2018-02-12 ASSESSMENT — PAIN SCALES - GENERAL
PAINLEVEL_OUTOF10: 7
PAINLEVEL_OUTOF10: 6

## 2018-02-12 NOTE — PLAN OF CARE
Problem: Restraint Use - Nonviolent/Non-Self-Destructive Behavior:  Goal: Absence of restraint indications  Absence of restraint indications   Outcome: Ongoing  Pt pulling at roman catheter. Goal: Absence of restraint-related injury  Absence of restraint-related injury   Outcome: Met This Shift  No restraint related injury.

## 2018-02-12 NOTE — PROGRESS NOTES
2/12/2018 at 10:47 AM    Patient seen and examined independently by me. Above discussed and I agree with resident note except where indicated in the EMR revision history. Also see my additional comments and changes indicated by discrete font, text color, italics, and/or initials. Labs, cultures, and radiographs where available were reviewed.      Electronically signed by Alo Humphreys MD on 2/12/2018 at 1:53 PM      Transferred to ICU due to worsening hypercapnic respiratory failure  CT of chest reviewed, tiny pulmonary nodules possibly infectious etiology   Doubt primary lung CA given the fact no large lung mass   Have discussed follow-up CT of chest with him, in the past he did not want to get it because of  pulmonary status and he would not be a candidate for resection  Will increase Solu-Medrol  Empiric antibiotics with vancomycin and Levaquin (looks to me more like an atypical infectious etiology)  Continue BiPAP  Follow-up ABG  Updated his wife  Stop IV fluids since 2 L are being removed on dialysis  Critical care time 35

## 2018-02-12 NOTE — PROGRESS NOTES
of complication, not stated as uncontrolled ([address] and Vitamin D deficiency. Social History:   reports that he has been smoking Cigarettes. He has been smoking about 2.00 packs per day. He does not have any smokeless tobacco history on file. He reports that he does not drink alcohol or use drugs. Family History: No family history on file. Vitals:  BP (!) 146/52   Pulse 77   Temp 97.8 °F (36.6 °C) (Axillary)   Resp 24   Ht 5' 8\" (1.727 m)   Wt 151 lb 3.8 oz (68.6 kg)   SpO2 94%   BMI 23.00 kg/m²   Temp (24hrs), Av.9 °F (36.6 °C), Min:97.7 °F (36.5 °C), Max:98.1 °F (36.7 °C)    Recent Labs      18   1129  18   1554  18   2042  18   0729   POCGLU  170*  119*  184*  81       I/O (24Hr): Intake/Output Summary (Last 24 hours) at 18 0940  Last data filed at 18 9210   Gross per 24 hour   Intake          2115.76 ml   Output              700 ml   Net          1415.76 ml       Labs:    @CBC@  basic metabolic panel    Lab Results   Component Value Date/Time    SPECIAL NOT REPORTED 2018 06:30 AM     Lab Results   Component Value Date/Time    CULTURE NORMAL RESPIRATORY ALLY MODERATE GROWTH 2018 06:30 AM    CULTURE  2018 06:30 AM     Performed at [address] [place] [address] [phone]       [facility]    Radiology:    Ct Abdomen Pelvis Wo Contrast Additional Contrast? Radiologist Recommendation    Result Date: 2018  EXAMINATION: CT OF THE ABDOMEN AND PELVIS WITHOUT CONTRAST 2018 11:03 am TECHNIQUE: CT of the abdomen and pelvis was performed without the administration of intravenous contrast. Multiplanar reformatted images are provided for review. Dose modulation, iterative reconstruction, and/or weight based adjustment of the mA/kV was utilized to reduce the radiation dose to as low as reasonably achievable. COMPARISON: CT abdomen/ pelvis from 2010.  HISTORY: ORDERING SYSTEM PROVIDED HISTORY: R/O Retroperitoneal 2/6/2018  EXAMINATION: CT OF THE CHEST WITHOUT CONTRAST 2/6/2018 2:41 pm TECHNIQUE: CT of the chest was performed without the administration of intravenous contrast. Multiplanar reformatted images are provided for review. Dose modulation, iterative reconstruction, and/or weight based adjustment of the mA/kV was utilized to reduce the radiation dose to as low as reasonably achievable. COMPARISON: January 8, 2016 HISTORY: ORDERING SYSTEM PROVIDED HISTORY: ACUTE RESP ILLNESS, >36YEARS OLD TECHNOLOGIST PROVIDED HISTORY: Ordering Physician Provided Reason for Exam: acute respiratory failure Acuity: Unknown Type of Exam: Unknown FINDINGS: Evaluation is limited by motion. Mediastinum: Cardiomegaly. No pericardial effusion. Coronary artery calcifications are seen. No mediastinal or hilar adenopathy is seen within limitations of a noncontrast study. Atherosclerotic calcification of the thoracic aorta. Lungs/Pleura: Trace pleural effusions. No pneumothorax. Chronic interstitial lung changes and emphysematous changes are again seen. Scarring is seen at the lung apices. No focal consolidation. Bronchiectasis is seen within the lower lobes. Adjacent to a vessel within the left lower lobe there is a 4 mm nodule series 4, image 70, not definitely seen on the prior study. A 4 mm nodular opacity is seen within the left lower lobe series 4, image 86, not definitely seen on the prior study. Within the right upper lobe there is a 9 mm spiculated nodule suboptimally visualized due to motion at this level series 4, image 34. This is not clearly seen on the prior study. Spiculated nodules along the right fissure appear smaller than on the previous study. A 10 mm subpleural nodule is seen within the superior segment of the right lower lobe series 4, image 41, not clearly seen on the prior study. Spiculated right lower lobe nodule measuring 13 mm series 4, image 63 is not clearly seen on the previous study.  Multiple nodular opacities are seen at the right lung base, increased from the previous study. For example there is a subpleural nodule measuring 9 mm series 4, image 105. Upper Abdomen: Limited views of the upper abdomen demonstrate a large cyst within the left kidney. No adrenal nodule. Soft Tissues/Bones: Vascular stent is seen along the left chest wall. No axillary adenopathy. No acute osseous abnormality. Multiple pulmonary nodules not seen on the previous study are concerning for malignancy. PET scan may be helpful for additional evaluation. Us Renal Complete    Result Date: 2/6/2018  EXAMINATION: RETROPERITONEAL ULTRASOUND OF THE KIDNEYS AND URINARY BLADDER 2/6/2018 COMPARISON: May 17, 2010 HISTORY: ORDERING SYSTEM PROVIDED HISTORY: RENAL FAILURE, CHRONIC (KIDNEY DISEASE) TECHNOLOGIST PROVIDED HISTORY: Acuity: Acute Type of Exam: Initial FINDINGS: Kidneys: The right kidney measures 9.6 cm in length and the left kidney measures 10 cm in length. Abnormally increased echogenicity is noted. No hydronephrosis or stones identified. Multiple bilateral cysts are present measuring up to 5.3 cm in the lower pole right kidney. Bladder: The bladder is well distended. Echogenic kidneys, consistent with intrinsic renal parenchymal disease. Bilateral renal cysts are noted. Xr Chest Portable    Result Date: 2/8/2018  EXAMINATION: SINGLE VIEW OF THE CHEST 2/8/2018 9:05 am COMPARISON: February 5, 2018 HISTORY: ORDERING SYSTEM PROVIDED HISTORY: Crackles on Auscultation TECHNOLOGIST PROVIDED HISTORY: Reason for exam:->Crackles on Auscultation Ordering Physician Provided Reason for Exam: crackles Acuity: Unknown Type of Exam: Unknown FINDINGS: The cardiomediastinal silhouette is stable. There is flattening of the diaphragms, likely related to COPD. There is mild pulmonary vascular congestion. There is mild atelectasis and small pleural effusions at the bilateral lung bases. No evidence of pneumothorax.  The osseous      Assessment:        Primary Problem  Acute on chronic respiratory failure with hypercapnia [facility])    Active Hospital Problems    Diagnosis Date Noted    Gastrointestinal hemorrhage [K92.2]     Abdominal wall hernia [K43.9]     Absolute anemia [D64.9]     Acute on chronic respiratory failure with hypercapnia ([facility] [J96.22] 2018    Acute on chronic systolic heart failure (HCC) [I50.23] 2018    Drug-induced hyperglycemia [R73.9, T50.905A] 2018    Anemia [D64.9]     [facility] ([facility] [I71.4] 2018    COPD exacerbation ([facility]) [J44.1] 2018    Type 2 diabetes mellitus without complication, with long-term current use of insulin ([facility]) [E11.9, Z79.4]     Chronic kidney disease [N18.9]        Plan:      Transfer to ICU     Hypercapnic acute-on-chronic respiratory failure, worsening    - Pulmonology on board   - Repeat AB.179/78.7/60.3/29.3/ 86.4% on BiPAP   - Duonebs, Dulera, BiPAP 12 hours   - Prednisone PO 20mg BID, Held, Solumedrol 10 mg IV Q12   - RT eval and treat     Systolic CHF, cardiology sign off   - EF 25%   - 1500 mL Fluid Restrict   - +1415 mL in last 24h; (-) 2.36L since admission   - IV Lasix 40 mg QD, held    - Will need HD per Nephro     ?ESRD, ALBERTINA on CKD refractory to Diuresis   - PT Scheduled for Tunneled Cath placement on    - Nephrology on board   - On Aranesp   - IV Lasix Held, IV NS at 75mL/hr   - Baseline creatinine ~2.9   - Cr 4.21 -> 3.88     Anemia, 2/2 chronic disease, ?acute loss   - GI on board   - Reticulocyte count 2.1%   - S/p 2u pRBCs with Hgb steady around 8.1-8.6   - Venofer per nephro (8 doses total)   - Hemoccult negative x1, repeat with stool sample, no overnight stools   - Colace 200mg PO QD for BM, held due to NPO     HTN   - Coreg 6.25 BID, hydralazine 25 TID, Imdur 30mg QD, Held due to NPO   - 10 mg Hydralazine q4h PRN for SBP >150 or DBP > 110    - 2.5 mg Lopressor Q6h PRN for SBP >150 or DBP > 110.  Hold if Hr <

## 2018-02-12 NOTE — CARE COORDINATION
ONGOING DISCHARGE PLAN:    Pt was transferred back to ICU, resp distress, bipap applied  Plan is for pt to d/c home with vns OL  Tiki owens placed for HD  Will continue to follow for additional discharge needs.     Electronically signed by David Whipple RN on 2/12/2018 at 10:14 AM

## 2018-02-12 NOTE — PROGRESS NOTES
Dialysis Post Treatment Report:     -Access Assessment  Catheter site C&D.     -Ultrafiltration   UF Goal 2400   Prime (-) 400   NS Flush (-) 0   Other (-/+) 0   Total UF Removed 2000     -Medications / Blood Administration  Medications Given (Y/N) n   Blood Products (Y/N) n     Narrative:  Patient tolerated HD well. No issues on treatment.

## 2018-02-12 NOTE — PROGRESS NOTES
Pt had called out to use restroom and PCT went in to assist.   Pt stood with PCT assist and pt slid to floor. PCT stated that she helped pt to floor when she realized he was going down. PCT called to RN for help. Pt denied getting hurt or hitting head. RN and PCT lifted pt back into bed. Appropriate staff notified.

## 2018-02-12 NOTE — PROGRESS NOTES
02/11/18   1751  02/12/18   0545   WBC  5.6   --   6.1   --   14.0*   HGB  8.4*   < >  8.3*  8.3*  9.3*   HCT  25.0*   < >  25.8*  26.4*  29.7*   MCV  99.1   --   98.4   --   99.5   PLT  167   --   158   --   169    < > = values in this interval not displayed. Recent Labs      02/10/18   0515  02/11/18   0635  02/12/18   0545   NA  139  136  139   K  4.7  4.0  4.0   CL  99  96*  101   CO2  31  30  28   GLUCOSE  113*  118*  83   BUN  93*  96*  94*   CREATININE  3.87*  4.21*  3.88*   LABGLOM  16*  14*  16*   GFRAA  19*  17*  19*         Assessment/Plan:     1. Acute kidney injury secondary to obstructive uropathy with significant urinary retention. QM catheter today. Acute hemodialysis with ultrafiltration ×2 hours. Renal USS shows multiple cysts-no calcification  Serologies SPEP, ANAcomplements-neg    2. Dyspnea secondary to acute exacerbation of chronic obstructive pulmonary disease as well as fluid overload secondary to decompensated systolic heart failure. Will monitor response to ultrafiltration. SOB related to AECOPD/fluid overload/AcuteDecompensated CHF with elevated BNP/renal failure     3. Anemia Of chronic kidney disease iron studies consistent with iron deficiency (Ferritin 153 and iron saturation 13%).   Venofer IV     4.  Cachexia with active smoking hx-r/o malignancy/multiple nodules on chest CT.     5.  Proteinuria-5 gms -SPEP-pending , complements negative       Brie Peña MD, Fady Gamez  Attending Nephrologist

## 2018-02-12 NOTE — PLAN OF CARE
monitor.     Goal: Control of acute pain  Control of acute pain   Outcome: Ongoing    Goal: Control of chronic pain  Control of chronic pain   Outcome: Ongoing

## 2018-02-12 NOTE — PROGRESS NOTES
chloride flush 0.9 % injection 10 mL 2 times per day   sodium chloride flush 0.9 % injection 10 mL PRN   acetaminophen (TYLENOL) tablet 650 mg Q4H PRN   magnesium hydroxide (MILK OF MAGNESIA) 400 MG/5ML suspension 30 mL Daily PRN   bisacodyl (DULCOLAX) suppository 10 mg Daily PRN   ondansetron (ZOFRAN) injection 4 mg Q6H PRN   nicotine (NICODERM CQ) 21 MG/24HR 1 patch Daily PRN   albuterol (PROVENTIL) nebulizer solution 2.5 mg Q2H PRN   ipratropium-albuterol (DUONEB) nebulizer solution 1 ampule Q4H WA   mometasone-formoterol (DULERA) 200-5 MCG/ACT inhaler 2 puff BID   diazepam (VALIUM) tablet 5 mg Q12H PRN   insulin lispro (HUMALOG) injection vial 0-12 Units TID WC   insulin lispro (HUMALOG) injection vial 0-6 Units Nightly   glucose (GLUTOSE) 40 % oral gel 15 g PRN   dextrose 50 % solution 12.5 g PRN   glucagon (rDNA) injection 1 mg PRN   dextrose 5 % solution PRN   ipratropium-albuterol (DUONEB) nebulizer solution 1 ampule As Directed RT PRN   sodium chloride flush 0.9 % injection 10 mL Q12H   sodium chloride flush 0.9 % injection 10 mL PRN       Data:     Code Status:  DNR-CCA    No family history on file. Social History     Social History    Marital status:      Spouse name: N/A    Number of children: N/A    Years of education: N/A     Occupational History    Not on file.      Social History Main Topics    Smoking status: Current Every Day Smoker     Packs/day: 2.00     Types: Cigarettes    Smokeless tobacco: Not on file    Alcohol use No    Drug use: No    Sexual activity: Not on file     Other Topics Concern    Not on file     Social History Narrative    No narrative on file       Vitals:  BP (!) 124/51   Pulse 78   Temp 97.8 °F (36.6 °C) (Axillary)   Resp 21   Ht 5' 8\" (1.727 m)   Wt 134 lb 7.7 oz (61 kg)   SpO2 94%   BMI 20.45 kg/m²   Temp (24hrs), Av.5 °F (36.4 °C), Min:96.6 °F (35.9 °C), Max:97.8 °F (36.6 °C)    Recent Labs      18   1554  18 0729  02/12/18   1223   POCGLU  119*  184*  81  71*       I/O (24Hr):     Intake/Output Summary (Last 24 hours) at 02/12/18 1632  Last data filed at 02/12/18 1607   Gross per 24 hour   Intake          2758.76 ml   Output             2700 ml   Net            58.76 ml       Labs:      CBC:   Lab Results   Component Value Date    WBC 14.0 02/12/2018    RBC 2.99 02/12/2018    HGB 9.3 02/12/2018    HCT 29.7 02/12/2018    MCV 99.5 02/12/2018    MCH 31.1 02/12/2018    MCHC 31.3 02/12/2018    RDW 18.1 02/12/2018     02/12/2018    MPV 7.2 02/12/2018     CBC with Differential:    Lab Results   Component Value Date    WBC 14.0 02/12/2018    RBC 2.99 02/12/2018    HGB 9.3 02/12/2018    HCT 29.7 02/12/2018     02/12/2018    MCV 99.5 02/12/2018    MCH 31.1 02/12/2018    MCHC 31.3 02/12/2018    RDW 18.1 02/12/2018    LYMPHOPCT 12 02/05/2018    MONOPCT 13 02/05/2018    BASOPCT 1 02/05/2018    MONOSABS 0.80 02/05/2018    LYMPHSABS 0.80 02/05/2018    EOSABS 0.00 02/05/2018    BASOSABS 0.10 02/05/2018    DIFFTYPE NOT REPORTED 02/05/2018     Hemoglobin/Hematocrit:    Lab Results   Component Value Date    HGB 9.3 02/12/2018    HCT 29.7 02/12/2018     CMP:    Lab Results   Component Value Date     02/12/2018    K 4.0 02/12/2018     02/12/2018    CO2 28 02/12/2018    BUN 94 02/12/2018    CREATININE 3.88 02/12/2018    GFRAA 19 02/12/2018    LABGLOM 16 02/12/2018    GLUCOSE 83 02/12/2018    GLUCOSE 86 03/27/2012    PROT 6.0 02/07/2018    LABALBU 2.7 02/06/2018    CALCIUM 8.3 02/12/2018    BILITOT <0.15 02/06/2018    ALKPHOS 57 02/06/2018    AST 13 02/06/2018    ALT 14 02/06/2018     BMP:    Lab Results   Component Value Date     02/12/2018    K 4.0 02/12/2018     02/12/2018    CO2 28 02/12/2018    BUN 94 02/12/2018    LABALBU 2.7 02/06/2018    CREATININE 3.88 02/12/2018    CALCIUM 8.3 02/12/2018    GFRAA 19 02/12/2018    LABGLOM 16 02/12/2018    GLUCOSE 83 02/12/2018    GLUCOSE 86 03/27/2012     PT/INR:

## 2018-02-12 NOTE — PROGRESS NOTES
Writer spoke with Dr. Jayden Davidson regarding STAT dialysis, he will put orders in.     Electronically signed by Ivelisse Dolan RN on 2/12/2018 at 10:56 AM

## 2018-02-13 ENCOUNTER — APPOINTMENT (OUTPATIENT)
Dept: GENERAL RADIOLOGY | Age: 68
DRG: 673 | End: 2018-02-13
Payer: COMMERCIAL

## 2018-02-13 LAB
ALLEN TEST: ABNORMAL
ANION GAP SERPL CALCULATED.3IONS-SCNC: 10 MMOL/L (ref 9–17)
BUN BLDV-MCNC: 62 MG/DL (ref 8–23)
BUN/CREAT BLD: ABNORMAL (ref 9–20)
CALCIUM SERPL-MCNC: 8.3 MG/DL (ref 8.6–10.4)
CARBOXYHEMOGLOBIN: 1.3 % (ref 0–5)
CHLORIDE BLD-SCNC: 100 MMOL/L (ref 98–107)
CO2: 28 MMOL/L (ref 20–31)
CREAT SERPL-MCNC: 3.01 MG/DL (ref 0.7–1.2)
DATE, STOOL #1: NORMAL
DATE, STOOL #2: NORMAL
DATE, STOOL #3: NORMAL
FIO2: ABNORMAL
GFR AFRICAN AMERICAN: 25 ML/MIN
GFR NON-AFRICAN AMERICAN: 21 ML/MIN
GFR SERPL CREATININE-BSD FRML MDRD: ABNORMAL ML/MIN/{1.73_M2}
GFR SERPL CREATININE-BSD FRML MDRD: ABNORMAL ML/MIN/{1.73_M2}
GLUCOSE BLD-MCNC: 103 MG/DL (ref 75–110)
GLUCOSE BLD-MCNC: 108 MG/DL (ref 75–110)
GLUCOSE BLD-MCNC: 113 MG/DL (ref 75–110)
GLUCOSE BLD-MCNC: 118 MG/DL (ref 70–99)
GLUCOSE BLD-MCNC: 133 MG/DL (ref 75–110)
HCO3 ARTERIAL: 28.5 MMOL/L (ref 22–26)
HCT VFR BLD CALC: 24.9 % (ref 41–53)
HCT VFR BLD CALC: 26.6 % (ref 41–53)
HEMOCCULT SP1 STL QL: NEGATIVE
HEMOCCULT SP2 STL QL: NORMAL
HEMOCCULT SP3 STL QL: NORMAL
HEMOGLOBIN: 7.9 G/DL (ref 13.5–17.5)
HEMOGLOBIN: 8.4 G/DL (ref 13.5–17.5)
MCH RBC QN AUTO: 31.1 PG (ref 26–34)
MCHC RBC AUTO-ENTMCNC: 31.5 G/DL (ref 31–37)
MCV RBC AUTO: 98.7 FL (ref 80–100)
METHEMOGLOBIN: 0.6 % (ref 0–1.9)
MODE: ABNORMAL
NEGATIVE BASE EXCESS, ART: ABNORMAL MMOL/L (ref 0–2)
NOTIFICATION TIME: ABNORMAL
NOTIFICATION: ABNORMAL
NRBC AUTOMATED: ABNORMAL PER 100 WBC
O2 DEVICE/FLOW/%: ABNORMAL
O2 SAT, ARTERIAL: 91.8 % (ref 95–98)
OXYHEMOGLOBIN: ABNORMAL % (ref 95–98)
PATIENT TEMP: 37
PCO2 ARTERIAL: 56.6 MMHG (ref 35–45)
PCO2, ART, TEMP ADJ: ABNORMAL (ref 35–45)
PDW BLD-RTO: 18.7 % (ref 11.5–14.9)
PEEP/CPAP: ABNORMAL
PH ARTERIAL: 7.31 (ref 7.35–7.45)
PH, ART, TEMP ADJ: ABNORMAL (ref 7.35–7.45)
PLATELET # BLD: 147 K/UL (ref 150–450)
PMV BLD AUTO: 7.8 FL (ref 6–12)
PO2 ARTERIAL: 65.8 MMHG (ref 80–100)
PO2, ART, TEMP ADJ: ABNORMAL MMHG (ref 80–100)
POSITIVE BASE EXCESS, ART: 2.2 MMOL/L (ref 0–2)
POTASSIUM SERPL-SCNC: 4.6 MMOL/L (ref 3.7–5.3)
PSV: ABNORMAL
PT. POSITION: ABNORMAL
RBC # BLD: 2.7 M/UL (ref 4.5–5.9)
RESPIRATORY RATE: 20
SAMPLE SITE: ABNORMAL
SET RATE: ABNORMAL
SODIUM BLD-SCNC: 138 MMOL/L (ref 135–144)
TEXT FOR RESPIRATORY: ABNORMAL
TIME, STOOL #1: 2243
TIME, STOOL #2: NORMAL
TIME, STOOL #3: NORMAL
TOTAL HB: ABNORMAL G/DL (ref 12–16)
TOTAL RATE: ABNORMAL
VT: ABNORMAL
WBC # BLD: 13.9 K/UL (ref 3.5–11)

## 2018-02-13 PROCEDURE — 36600 WITHDRAWAL OF ARTERIAL BLOOD: CPT

## 2018-02-13 PROCEDURE — 2580000003 HC RX 258: Performed by: EMERGENCY MEDICINE

## 2018-02-13 PROCEDURE — 6360000002 HC RX W HCPCS: Performed by: INTERNAL MEDICINE

## 2018-02-13 PROCEDURE — 85018 HEMOGLOBIN: CPT

## 2018-02-13 PROCEDURE — 2580000003 HC RX 258: Performed by: INTERNAL MEDICINE

## 2018-02-13 PROCEDURE — S0028 INJECTION, FAMOTIDINE, 20 MG: HCPCS | Performed by: STUDENT IN AN ORGANIZED HEALTH CARE EDUCATION/TRAINING PROGRAM

## 2018-02-13 PROCEDURE — 2000000000 HC ICU R&B

## 2018-02-13 PROCEDURE — 93970 EXTREMITY STUDY: CPT

## 2018-02-13 PROCEDURE — 82805 BLOOD GASES W/O2 SATURATION: CPT

## 2018-02-13 PROCEDURE — 36415 COLL VENOUS BLD VENIPUNCTURE: CPT

## 2018-02-13 PROCEDURE — 6360000002 HC RX W HCPCS: Performed by: FAMILY MEDICINE

## 2018-02-13 PROCEDURE — 71045 X-RAY EXAM CHEST 1 VIEW: CPT

## 2018-02-13 PROCEDURE — 82272 OCCULT BLD FECES 1-3 TESTS: CPT

## 2018-02-13 PROCEDURE — 6370000000 HC RX 637 (ALT 250 FOR IP): Performed by: STUDENT IN AN ORGANIZED HEALTH CARE EDUCATION/TRAINING PROGRAM

## 2018-02-13 PROCEDURE — 6360000002 HC RX W HCPCS: Performed by: STUDENT IN AN ORGANIZED HEALTH CARE EDUCATION/TRAINING PROGRAM

## 2018-02-13 PROCEDURE — 2500000003 HC RX 250 WO HCPCS: Performed by: STUDENT IN AN ORGANIZED HEALTH CARE EDUCATION/TRAINING PROGRAM

## 2018-02-13 PROCEDURE — 85014 HEMATOCRIT: CPT

## 2018-02-13 PROCEDURE — 87641 MR-STAPH DNA AMP PROBE: CPT

## 2018-02-13 PROCEDURE — 94660 CPAP INITIATION&MGMT: CPT

## 2018-02-13 PROCEDURE — 99233 SBSQ HOSP IP/OBS HIGH 50: CPT | Performed by: INTERNAL MEDICINE

## 2018-02-13 PROCEDURE — 94640 AIRWAY INHALATION TREATMENT: CPT

## 2018-02-13 PROCEDURE — 85027 COMPLETE CBC AUTOMATED: CPT

## 2018-02-13 PROCEDURE — 80048 BASIC METABOLIC PNL TOTAL CA: CPT

## 2018-02-13 PROCEDURE — 90937 HEMODIALYSIS REPEATED EVAL: CPT

## 2018-02-13 PROCEDURE — 6370000000 HC RX 637 (ALT 250 FOR IP): Performed by: INTERNAL MEDICINE

## 2018-02-13 PROCEDURE — 6370000000 HC RX 637 (ALT 250 FOR IP): Performed by: NURSE PRACTITIONER

## 2018-02-13 PROCEDURE — 94762 N-INVAS EAR/PLS OXIMTRY CONT: CPT

## 2018-02-13 PROCEDURE — 82947 ASSAY GLUCOSE BLOOD QUANT: CPT

## 2018-02-13 RX ORDER — CARVEDILOL 6.25 MG/1
6.25 TABLET ORAL 2 TIMES DAILY WITH MEALS
Status: DISCONTINUED | OUTPATIENT
Start: 2018-02-13 | End: 2018-02-27 | Stop reason: HOSPADM

## 2018-02-13 RX ORDER — HYDRALAZINE HYDROCHLORIDE 10 MG/1
10 TABLET, FILM COATED ORAL 3 TIMES DAILY
Status: DISCONTINUED | OUTPATIENT
Start: 2018-02-13 | End: 2018-02-14

## 2018-02-13 RX ADMIN — METOPROLOL TARTRATE 2.5 MG: 5 INJECTION, SOLUTION INTRAVENOUS at 17:40

## 2018-02-13 RX ADMIN — HEPARIN SODIUM 5000 UNITS: 5000 INJECTION, SOLUTION INTRAVENOUS; SUBCUTANEOUS at 12:10

## 2018-02-13 RX ADMIN — METHYLPREDNISOLONE SODIUM SUCCINATE 40 MG: 40 INJECTION, POWDER, FOR SOLUTION INTRAMUSCULAR; INTRAVENOUS at 17:40

## 2018-02-13 RX ADMIN — METHYLPREDNISOLONE SODIUM SUCCINATE 40 MG: 40 INJECTION, POWDER, FOR SOLUTION INTRAMUSCULAR; INTRAVENOUS at 12:01

## 2018-02-13 RX ADMIN — IPRATROPIUM BROMIDE AND ALBUTEROL SULFATE 1 AMPULE: .5; 3 SOLUTION RESPIRATORY (INHALATION) at 15:42

## 2018-02-13 RX ADMIN — METOPROLOL TARTRATE 2.5 MG: 5 INJECTION, SOLUTION INTRAVENOUS at 11:54

## 2018-02-13 RX ADMIN — HYDRALAZINE HYDROCHLORIDE 10 MG: 10 TABLET, FILM COATED ORAL at 21:44

## 2018-02-13 RX ADMIN — HYDRALAZINE HYDROCHLORIDE 10 MG: 20 INJECTION INTRAMUSCULAR; INTRAVENOUS at 16:26

## 2018-02-13 RX ADMIN — METHYLPREDNISOLONE SODIUM SUCCINATE 40 MG: 40 INJECTION, POWDER, FOR SOLUTION INTRAMUSCULAR; INTRAVENOUS at 04:45

## 2018-02-13 RX ADMIN — FAMOTIDINE 10 MG: 10 INJECTION, SOLUTION INTRAVENOUS at 12:10

## 2018-02-13 RX ADMIN — HYDRALAZINE HYDROCHLORIDE 10 MG: 20 INJECTION INTRAMUSCULAR; INTRAVENOUS at 23:59

## 2018-02-13 RX ADMIN — OXYCODONE HYDROCHLORIDE 5 MG: 5 TABLET ORAL at 11:09

## 2018-02-13 RX ADMIN — IPRATROPIUM BROMIDE AND ALBUTEROL SULFATE 1 AMPULE: .5; 3 SOLUTION RESPIRATORY (INHALATION) at 19:41

## 2018-02-13 RX ADMIN — IPRATROPIUM BROMIDE AND ALBUTEROL SULFATE 1 AMPULE: .5; 3 SOLUTION RESPIRATORY (INHALATION) at 08:18

## 2018-02-13 RX ADMIN — IPRATROPIUM BROMIDE AND ALBUTEROL SULFATE 1 AMPULE: .5; 3 SOLUTION RESPIRATORY (INHALATION) at 11:25

## 2018-02-13 RX ADMIN — HEPARIN SODIUM 1200 UNITS: 1000 INJECTION, SOLUTION INTRAVENOUS; SUBCUTANEOUS at 10:37

## 2018-02-13 RX ADMIN — HEPARIN SODIUM 5000 UNITS: 5000 INJECTION, SOLUTION INTRAVENOUS; SUBCUTANEOUS at 21:45

## 2018-02-13 RX ADMIN — METHYLPREDNISOLONE SODIUM SUCCINATE 40 MG: 40 INJECTION, POWDER, FOR SOLUTION INTRAMUSCULAR; INTRAVENOUS at 23:11

## 2018-02-13 RX ADMIN — OXYCODONE HYDROCHLORIDE 5 MG: 5 TABLET ORAL at 03:27

## 2018-02-13 RX ADMIN — CARVEDILOL 6.25 MG: 6.25 TABLET, FILM COATED ORAL at 21:01

## 2018-02-13 RX ADMIN — Medication 2 PUFF: at 21:01

## 2018-02-13 RX ADMIN — Medication 2 PUFF: at 12:14

## 2018-02-13 RX ADMIN — METOPROLOL TARTRATE 2.5 MG: 5 INJECTION, SOLUTION INTRAVENOUS at 04:49

## 2018-02-13 RX ADMIN — ACETAMINOPHEN 650 MG: 325 TABLET, FILM COATED ORAL at 04:42

## 2018-02-13 RX ADMIN — IRON SUCROSE 100 MG: 20 INJECTION, SOLUTION INTRAVENOUS at 17:40

## 2018-02-13 RX ADMIN — Medication 10 ML: at 21:50

## 2018-02-13 RX ADMIN — DEXTROSE AND SODIUM CHLORIDE: 5; 450 INJECTION, SOLUTION INTRAVENOUS at 12:00

## 2018-02-13 RX ADMIN — OXYCODONE HYDROCHLORIDE AND ACETAMINOPHEN 1 TABLET: 5; 325 TABLET ORAL at 02:12

## 2018-02-13 RX ADMIN — Medication 10 ML: at 12:13

## 2018-02-13 RX ADMIN — HEPARIN SODIUM 5000 UNITS: 5000 INJECTION, SOLUTION INTRAVENOUS; SUBCUTANEOUS at 06:06

## 2018-02-13 RX ADMIN — OXYCODONE HYDROCHLORIDE AND ACETAMINOPHEN 1 TABLET: 5; 325 TABLET ORAL at 18:43

## 2018-02-13 RX ADMIN — OXYCODONE HYDROCHLORIDE AND ACETAMINOPHEN 1 TABLET: 5; 325 TABLET ORAL at 09:44

## 2018-02-13 ASSESSMENT — PAIN SCALES - GENERAL
PAINLEVEL_OUTOF10: 7
PAINLEVEL_OUTOF10: 8
PAINLEVEL_OUTOF10: 7

## 2018-02-13 NOTE — PROGRESS NOTES
-882.83 ml       EXAM     General Appearance  Awake, alert, seems more agitated today  HEENT - normocephalic, atraumatic.    Neck - Supple,  trachea midline , No JVD  Lungs - coarse rhonchi , mild wheezing  Cardiovascular - Heart sounds are normal.  Regular rate and rhythm, SM   Abdomen - Soft, little tender, nondistended, bowel sound present  Neurologic - appropriate, following commands, agitated  Skin - No bruising or bleeding  Extremities - No clubbing, cyanosis, edema    MEDS      heparin (porcine)  10,000 Units Intercatheter Once    famotidine (PEPCID) injection  10 mg Intravenous Daily    metoprolol  2.5 mg Intravenous Q6H    heparin (porcine)  5,000 Units Subcutaneous 3 times per day    [START ON 2/14/2018] levofloxacin  500 mg Intravenous Q48H    vancomycin (VANCOCIN) intermittent dosing (placeholder)   Other RX Placeholder    methylPREDNISolone  40 mg Intravenous Q6H    insulin lispro  0-6 Units Subcutaneous TID WC    insulin lispro  0-3 Units Subcutaneous Nightly    docusate sodium  200 mg Oral Daily    iron sucrose  100 mg Intravenous Q24H    darbepoetin trang-polysorbate  25 mcg Subcutaneous Weekly - Thursday    sodium chloride flush  10 mL Intravenous 2 times per day    ipratropium-albuterol  1 ampule Inhalation Q4H WA    mometasone-formoterol  2 puff Inhalation BID    sodium chloride flush  10 mL Intravenous Q12H      dextrose 5 % and 0.45 % NaCl 50 mL/hr at 02/12/18 2000    dextrose       hydrALAZINE, heparin (porcine), heparin (porcine), oxyCODONE-acetaminophen **AND** oxyCODONE, sodium chloride flush, acetaminophen, magnesium hydroxide, bisacodyl, ondansetron, nicotine, albuterol, diazepam, glucose, dextrose, glucagon (rDNA), dextrose, ipratropium-albuterol, sodium chloride flush    LABS   CBC   Recent Labs      02/13/18   0522   WBC  13.9*   HGB  8.4*   HCT  26.6*   MCV  98.7   PLT  147*     BMP:   Lab Results   Component Value Date     02/13/2018    K 4.6 02/13/2018     9:04 AM

## 2018-02-13 NOTE — PROGRESS NOTES
([facility] [address]    Acute on chronic systolic heart failure (HCC)    Drug-induced hyperglycemia    Anemia    Gastrointestinal hemorrhage    Abdominal wall hernia    Absolute anemia  Resolved Problems:    * No resolved hospital problems. *  CKD    Plan  1. Your medical management  2.  Will follow for possible tunneled catheter placement

## 2018-02-13 NOTE — PROGRESS NOTES
[address] [address]    Progress Note    2/13/2018    10:04 AM    Name:   Karen Macdonald  MRN:     [id number]     [place]:      [de-identified]   Room:   2007/2007-01   Day:  8  Admit Date:  2/5/2018  7:29 PM    PCP:   Dayna Mohs, MD  Code Status:  DNR-CCA    Subjective:     C/C:   Chief Complaint   Patient presents with    Shortness of Breath     Interval History Status: improved. Patient seen at bedside. Used BiPAP overnight. Could only tolerate 5 min of NC at 4 LPM. Had 2 L of UF off during hemodialysis. GOal is for 1.5 L today. Pt remains oriented only to person and place. B/L soft mittens in place to prevent pt pulling out devices    Brief History:     The patient is a 75 y.o. Non-/non  male wit hPMHx of COPD on 3 LPM, Systolic CHF, and CKD baseline Cr of 2.90 who presents with Shortness of Breath. Patient was at PCP on 2/1 for similar complaint, and was given Levaquin and medrol dose pack PO. At the time, he had a productive cough, and chest pain with coughing. He had no improvement in his Sx, and came to ED for revaluation. In the ED, his O2 sat was at 85% on 3 LPM. He was given 4 LPM by Venturi mask. Pulmonology was consulted. CXR showed small B/L Pleural effusions and R sided hilar density. His Cr was also elevated at 3.70, so nephrology was consulted. Hgb was 7.0 in ED and he was given 1 U PRBC. Pt denied SANJAY, but reports possible bloody stools. He was given IV solumedrol bolus, and admitted to ICU intermediate   for the management of  Acute on Chronic Respiratory Failure     Review of Systems:     ROS   Unable to perform ROS: Mental status change           Medications: Allergies:     Allergies   Allergen Reactions    Codeine     Contrast [Iodides]      IVP dyes       Current Meds:   Scheduled Meds:    heparin (porcine)  10,000 Units Intercatheter Once    famotidine (PEPCID) injection  10 mg Intravenous Daily   

## 2018-02-13 NOTE — PROGRESS NOTES
Dialysis Post Treatment Report:     -Access Assessment  no s/s infection     -Ultrafiltration   UF Goal 2000   Prime (-) 400   NS Flush (-) 100   Other (-/+)    Total UF Removed 1500     -Medications / Blood Administration  Medications Given (Y/N) n   Blood Products (Y/N) m     Narrative:  Pt. tolerated treatment well.

## 2018-02-14 ENCOUNTER — APPOINTMENT (OUTPATIENT)
Dept: GENERAL RADIOLOGY | Age: 68
DRG: 673 | End: 2018-02-14
Payer: COMMERCIAL

## 2018-02-14 ENCOUNTER — ANESTHESIA EVENT (OUTPATIENT)
Dept: OPERATING ROOM | Age: 68
DRG: 673 | End: 2018-02-14
Payer: COMMERCIAL

## 2018-02-14 LAB
ANION GAP SERPL CALCULATED.3IONS-SCNC: 10 MMOL/L (ref 9–17)
BUN BLDV-MCNC: 39 MG/DL (ref 8–23)
BUN/CREAT BLD: ABNORMAL (ref 9–20)
CALCIUM SERPL-MCNC: 8.6 MG/DL (ref 8.6–10.4)
CALCIUM SERPL-MCNC: 9.1 MG/DL (ref 8.6–10.4)
CHLORIDE BLD-SCNC: 100 MMOL/L (ref 98–107)
CO2: 26 MMOL/L (ref 20–31)
CREAT SERPL-MCNC: 2.29 MG/DL (ref 0.7–1.2)
FOLATE: 16.4 NG/ML
GFR AFRICAN AMERICAN: 35 ML/MIN
GFR NON-AFRICAN AMERICAN: 29 ML/MIN
GFR SERPL CREATININE-BSD FRML MDRD: ABNORMAL ML/MIN/{1.73_M2}
GFR SERPL CREATININE-BSD FRML MDRD: ABNORMAL ML/MIN/{1.73_M2}
GLUCOSE BLD-MCNC: 107 MG/DL (ref 70–99)
GLUCOSE BLD-MCNC: 133 MG/DL (ref 75–110)
GLUCOSE BLD-MCNC: 148 MG/DL (ref 75–110)
GLUCOSE BLD-MCNC: 87 MG/DL (ref 75–110)
HCT VFR BLD CALC: 24.9 % (ref 41–53)
HCT VFR BLD CALC: 25.5 % (ref 41–53)
HCT VFR BLD CALC: 27.4 % (ref 41–53)
HEMOGLOBIN: 7.9 G/DL (ref 13.5–17.5)
HEMOGLOBIN: 8.1 G/DL (ref 13.5–17.5)
HEMOGLOBIN: 8.7 G/DL (ref 13.5–17.5)
HEPARIN INDUCED PLATELET ANTIBODY: 0.26 O.D. (ref 0–0.4)
MAGNESIUM: 1.7 MG/DL (ref 1.6–2.6)
MCH RBC QN AUTO: 32.1 PG (ref 26–34)
MCHC RBC AUTO-ENTMCNC: 31.7 G/DL (ref 31–37)
MCV RBC AUTO: 101 FL (ref 80–100)
MRSA, DNA, NASAL: ABNORMAL
NRBC AUTOMATED: ABNORMAL PER 100 WBC
PDW BLD-RTO: 18.3 % (ref 11.5–14.9)
PLATELET # BLD: 135 K/UL (ref 150–450)
PMV BLD AUTO: 7.8 FL (ref 6–12)
POTASSIUM SERPL-SCNC: 4.1 MMOL/L (ref 3.7–5.3)
RBC # BLD: 2.53 M/UL (ref 4.5–5.9)
SODIUM BLD-SCNC: 136 MMOL/L (ref 135–144)
SPECIMEN DESCRIPTION: ABNORMAL
THYROXINE, FREE: 1.13 NG/DL (ref 0.93–1.7)
TOTAL CK: 178 U/L (ref 39–308)
TSH SERPL DL<=0.05 MIU/L-ACNC: 0.51 MIU/L (ref 0.3–5)
VANCOMYCIN RANDOM DATE LAST DOSE: NORMAL
VANCOMYCIN RANDOM DOSE AMOUNT: 1000
VANCOMYCIN RANDOM TIME LAST DOSE: 1604
VANCOMYCIN RANDOM: 8.7 UG/ML
VITAMIN B-12: 699 PG/ML (ref 232–1245)
VITAMIN D 25-HYDROXY: 15.5 NG/ML (ref 30–100)
WBC # BLD: 9.4 K/UL (ref 3.5–11)

## 2018-02-14 PROCEDURE — 94640 AIRWAY INHALATION TREATMENT: CPT

## 2018-02-14 PROCEDURE — 2000000000 HC ICU R&B

## 2018-02-14 PROCEDURE — 83735 ASSAY OF MAGNESIUM: CPT

## 2018-02-14 PROCEDURE — 82947 ASSAY GLUCOSE BLOOD QUANT: CPT

## 2018-02-14 PROCEDURE — 82746 ASSAY OF FOLIC ACID SERUM: CPT

## 2018-02-14 PROCEDURE — 84165 PROTEIN E-PHORESIS SERUM: CPT

## 2018-02-14 PROCEDURE — 80202 ASSAY OF VANCOMYCIN: CPT

## 2018-02-14 PROCEDURE — 86022 PLATELET ANTIBODIES: CPT

## 2018-02-14 PROCEDURE — 82306 VITAMIN D 25 HYDROXY: CPT

## 2018-02-14 PROCEDURE — 6370000000 HC RX 637 (ALT 250 FOR IP): Performed by: STUDENT IN AN ORGANIZED HEALTH CARE EDUCATION/TRAINING PROGRAM

## 2018-02-14 PROCEDURE — 2500000003 HC RX 250 WO HCPCS: Performed by: STUDENT IN AN ORGANIZED HEALTH CARE EDUCATION/TRAINING PROGRAM

## 2018-02-14 PROCEDURE — 6370000000 HC RX 637 (ALT 250 FOR IP): Performed by: NURSE PRACTITIONER

## 2018-02-14 PROCEDURE — 85014 HEMATOCRIT: CPT

## 2018-02-14 PROCEDURE — 6360000002 HC RX W HCPCS: Performed by: INTERNAL MEDICINE

## 2018-02-14 PROCEDURE — 94762 N-INVAS EAR/PLS OXIMTRY CONT: CPT

## 2018-02-14 PROCEDURE — 6370000000 HC RX 637 (ALT 250 FOR IP): Performed by: INTERNAL MEDICINE

## 2018-02-14 PROCEDURE — 6360000002 HC RX W HCPCS: Performed by: STUDENT IN AN ORGANIZED HEALTH CARE EDUCATION/TRAINING PROGRAM

## 2018-02-14 PROCEDURE — 36415 COLL VENOUS BLD VENIPUNCTURE: CPT

## 2018-02-14 PROCEDURE — 85027 COMPLETE CBC AUTOMATED: CPT

## 2018-02-14 PROCEDURE — 82310 ASSAY OF CALCIUM: CPT

## 2018-02-14 PROCEDURE — 84155 ASSAY OF PROTEIN SERUM: CPT

## 2018-02-14 PROCEDURE — 85018 HEMOGLOBIN: CPT

## 2018-02-14 PROCEDURE — 6360000002 HC RX W HCPCS: Performed by: FAMILY MEDICINE

## 2018-02-14 PROCEDURE — 86334 IMMUNOFIX E-PHORESIS SERUM: CPT

## 2018-02-14 PROCEDURE — 99233 SBSQ HOSP IP/OBS HIGH 50: CPT | Performed by: INTERNAL MEDICINE

## 2018-02-14 PROCEDURE — 80048 BASIC METABOLIC PNL TOTAL CA: CPT

## 2018-02-14 PROCEDURE — 94660 CPAP INITIATION&MGMT: CPT

## 2018-02-14 PROCEDURE — 82550 ASSAY OF CK (CPK): CPT

## 2018-02-14 PROCEDURE — 2580000003 HC RX 258: Performed by: NURSE PRACTITIONER

## 2018-02-14 PROCEDURE — 2580000003 HC RX 258: Performed by: INTERNAL MEDICINE

## 2018-02-14 PROCEDURE — 84439 ASSAY OF FREE THYROXINE: CPT

## 2018-02-14 PROCEDURE — S0028 INJECTION, FAMOTIDINE, 20 MG: HCPCS | Performed by: STUDENT IN AN ORGANIZED HEALTH CARE EDUCATION/TRAINING PROGRAM

## 2018-02-14 PROCEDURE — 90937 HEMODIALYSIS REPEATED EVAL: CPT

## 2018-02-14 PROCEDURE — 82607 VITAMIN B-12: CPT

## 2018-02-14 PROCEDURE — 84443 ASSAY THYROID STIM HORMONE: CPT

## 2018-02-14 PROCEDURE — 2580000003 HC RX 258: Performed by: STUDENT IN AN ORGANIZED HEALTH CARE EDUCATION/TRAINING PROGRAM

## 2018-02-14 PROCEDURE — 2580000003 HC RX 258: Performed by: EMERGENCY MEDICINE

## 2018-02-14 PROCEDURE — 71045 X-RAY EXAM CHEST 1 VIEW: CPT

## 2018-02-14 RX ORDER — HYDRALAZINE HYDROCHLORIDE 25 MG/1
25 TABLET, FILM COATED ORAL EVERY 8 HOURS SCHEDULED
Status: DISCONTINUED | OUTPATIENT
Start: 2018-02-14 | End: 2018-02-15

## 2018-02-14 RX ORDER — FENTANYL CITRATE 50 UG/ML
25 INJECTION, SOLUTION INTRAMUSCULAR; INTRAVENOUS
Status: DISCONTINUED | OUTPATIENT
Start: 2018-02-14 | End: 2018-02-27 | Stop reason: HOSPADM

## 2018-02-14 RX ORDER — ISOSORBIDE MONONITRATE 30 MG/1
30 TABLET, EXTENDED RELEASE ORAL DAILY
Status: DISCONTINUED | OUTPATIENT
Start: 2018-02-14 | End: 2018-02-27 | Stop reason: HOSPADM

## 2018-02-14 RX ADMIN — OXYCODONE HYDROCHLORIDE AND ACETAMINOPHEN 1 TABLET: 5; 325 TABLET ORAL at 19:09

## 2018-02-14 RX ADMIN — HYDRALAZINE HYDROCHLORIDE 25 MG: 10 TABLET, FILM COATED ORAL at 22:21

## 2018-02-14 RX ADMIN — FENTANYL CITRATE 25 MCG: 50 INJECTION INTRAMUSCULAR; INTRAVENOUS at 17:30

## 2018-02-14 RX ADMIN — ISOSORBIDE MONONITRATE 30 MG: 30 TABLET, EXTENDED RELEASE ORAL at 13:02

## 2018-02-14 RX ADMIN — OXYCODONE HYDROCHLORIDE 5 MG: 5 TABLET ORAL at 13:29

## 2018-02-14 RX ADMIN — OXYCODONE HYDROCHLORIDE 5 MG: 5 TABLET ORAL at 09:27

## 2018-02-14 RX ADMIN — METHYLPREDNISOLONE SODIUM SUCCINATE 40 MG: 40 INJECTION, POWDER, FOR SOLUTION INTRAMUSCULAR; INTRAVENOUS at 05:07

## 2018-02-14 RX ADMIN — Medication 10 ML: at 20:06

## 2018-02-14 RX ADMIN — METHYLPREDNISOLONE SODIUM SUCCINATE 40 MG: 40 INJECTION, POWDER, FOR SOLUTION INTRAMUSCULAR; INTRAVENOUS at 12:27

## 2018-02-14 RX ADMIN — Medication 10 ML: at 12:03

## 2018-02-14 RX ADMIN — CARVEDILOL 6.25 MG: 6.25 TABLET, FILM COATED ORAL at 17:29

## 2018-02-14 RX ADMIN — Medication 2 PUFF: at 19:09

## 2018-02-14 RX ADMIN — FAMOTIDINE 10 MG: 10 INJECTION, SOLUTION INTRAVENOUS at 12:03

## 2018-02-14 RX ADMIN — OXYCODONE HYDROCHLORIDE 5 MG: 5 TABLET ORAL at 03:56

## 2018-02-14 RX ADMIN — DIAZEPAM 5 MG: 5 TABLET ORAL at 19:33

## 2018-02-14 RX ADMIN — VANCOMYCIN HYDROCHLORIDE 750 MG: 10 INJECTION, POWDER, LYOPHILIZED, FOR SOLUTION INTRAVENOUS at 15:21

## 2018-02-14 RX ADMIN — IPRATROPIUM BROMIDE AND ALBUTEROL SULFATE 1 AMPULE: .5; 3 SOLUTION RESPIRATORY (INHALATION) at 19:38

## 2018-02-14 RX ADMIN — HEPARIN SODIUM 1200 UNITS: 1000 INJECTION, SOLUTION INTRAVENOUS; SUBCUTANEOUS at 10:58

## 2018-02-14 RX ADMIN — OXYCODONE HYDROCHLORIDE AND ACETAMINOPHEN 1 TABLET: 5; 325 TABLET ORAL at 03:55

## 2018-02-14 RX ADMIN — HYDRALAZINE HYDROCHLORIDE 10 MG: 20 INJECTION INTRAMUSCULAR; INTRAVENOUS at 07:24

## 2018-02-14 RX ADMIN — HEPARIN SODIUM 5000 UNITS: 5000 INJECTION, SOLUTION INTRAVENOUS; SUBCUTANEOUS at 22:22

## 2018-02-14 RX ADMIN — Medication 2 PUFF: at 12:04

## 2018-02-14 RX ADMIN — OXYCODONE HYDROCHLORIDE AND ACETAMINOPHEN 1 TABLET: 5; 325 TABLET ORAL at 12:38

## 2018-02-14 RX ADMIN — IPRATROPIUM BROMIDE AND ALBUTEROL SULFATE 1 AMPULE: .5; 3 SOLUTION RESPIRATORY (INHALATION) at 11:21

## 2018-02-14 RX ADMIN — OXYCODONE HYDROCHLORIDE 5 MG: 5 TABLET ORAL at 20:17

## 2018-02-14 RX ADMIN — METHYLPREDNISOLONE SODIUM SUCCINATE 40 MG: 40 INJECTION, POWDER, FOR SOLUTION INTRAMUSCULAR; INTRAVENOUS at 18:22

## 2018-02-14 RX ADMIN — IRON SUCROSE 100 MG: 20 INJECTION, SOLUTION INTRAVENOUS at 17:38

## 2018-02-14 RX ADMIN — LEVOFLOXACIN 500 MG: 5 INJECTION, SOLUTION INTRAVENOUS at 11:52

## 2018-02-14 RX ADMIN — HYDRALAZINE HYDROCHLORIDE 10 MG: 20 INJECTION INTRAMUSCULAR; INTRAVENOUS at 04:13

## 2018-02-14 RX ADMIN — METHYLPREDNISOLONE SODIUM SUCCINATE 40 MG: 40 INJECTION, POWDER, FOR SOLUTION INTRAMUSCULAR; INTRAVENOUS at 22:22

## 2018-02-14 RX ADMIN — FENTANYL CITRATE 25 MCG: 50 INJECTION INTRAMUSCULAR; INTRAVENOUS at 20:12

## 2018-02-14 RX ADMIN — HEPARIN SODIUM 5000 UNITS: 5000 INJECTION, SOLUTION INTRAVENOUS; SUBCUTANEOUS at 15:49

## 2018-02-14 RX ADMIN — IPRATROPIUM BROMIDE AND ALBUTEROL SULFATE 1 AMPULE: .5; 3 SOLUTION RESPIRATORY (INHALATION) at 07:25

## 2018-02-14 RX ADMIN — FENTANYL CITRATE 25 MCG: 50 INJECTION INTRAMUSCULAR; INTRAVENOUS at 23:50

## 2018-02-14 RX ADMIN — IPRATROPIUM BROMIDE AND ALBUTEROL SULFATE 1 AMPULE: .5; 3 SOLUTION RESPIRATORY (INHALATION) at 15:15

## 2018-02-14 RX ADMIN — HEPARIN SODIUM 5000 UNITS: 5000 INJECTION, SOLUTION INTRAVENOUS; SUBCUTANEOUS at 06:02

## 2018-02-14 ASSESSMENT — ENCOUNTER SYMPTOMS
COUGH: 0
SHORTNESS OF BREATH: 1
ABDOMINAL PAIN: 1
NAUSEA: 0
SHORTNESS OF BREATH: 0
CONSTIPATION: 0
VOMITING: 0

## 2018-02-14 ASSESSMENT — PAIN DESCRIPTION - PAIN TYPE
TYPE: CHRONIC PAIN

## 2018-02-14 ASSESSMENT — PAIN SCALES - GENERAL
PAINLEVEL_OUTOF10: 7
PAINLEVEL_OUTOF10: 8
PAINLEVEL_OUTOF10: 3
PAINLEVEL_OUTOF10: 8
PAINLEVEL_OUTOF10: 5
PAINLEVEL_OUTOF10: 7
PAINLEVEL_OUTOF10: 7
PAINLEVEL_OUTOF10: 8
PAINLEVEL_OUTOF10: 6
PAINLEVEL_OUTOF10: 6
PAINLEVEL_OUTOF10: 7

## 2018-02-14 ASSESSMENT — PAIN DESCRIPTION - FREQUENCY: FREQUENCY: CONTINUOUS

## 2018-02-14 ASSESSMENT — PAIN DESCRIPTION - LOCATION
LOCATION: LEG
LOCATION: GENERALIZED
LOCATION: LEG

## 2018-02-14 ASSESSMENT — LIFESTYLE VARIABLES: SMOKING_STATUS: 1

## 2018-02-14 ASSESSMENT — PAIN DESCRIPTION - ORIENTATION
ORIENTATION: RIGHT
ORIENTATION: RIGHT

## 2018-02-14 NOTE — PROGRESS NOTES
Nutrition Assessment    Type and Reason for Visit: Reassess    Nutrition Recommendations: Recommend consider SLP evaluation. Continue current diet as appropriate. Ensure Enlive BID. Malnutrition Assessment:  · Malnutrition Status: At risk for malnutrition  · Context: Acute illness or injury  · Findings of the 6 clinical characteristics of malnutrition (Minimum of 2 out of 6 clinical characteristics is required to make the diagnosis of moderate or severe Protein Calorie Malnutrition based on AND/ASPEN Guidelines):  1. Energy Intake-Less than or equal to 50%, greater than or equal to 5 days    2. Weight Loss-No significant weight loss,    3. Fat Loss-Unable to assess,    4. Muscle Loss-Unable to assess,    5. Fluid Accumulation-No significant fluid accumulation, Extremities    Nutrition Diagnosis:   · Problem: Inadequate oral intake  · Etiology: related to Acute injury/trauma     Signs and symptoms:  as evidenced by Diet history of poor intake, Intake 0-25%    Nutrition Assessment:  · Subjective Assessment: Pt was previously on pureed diet but was apparently refusing, so MD allowed advancement to dental soft. SLP is not on pt case. RN reports pt has not been eating much, but she has observed him tolerating diet okay. RD encouraged RN to see about SLP eval. Pt asleep x 3 attempts at visiting with pt. Would benefit from oral nutrition supplementation.   · Current Nutrition Therapies:  · Oral Diet Orders: Renal, Carb Control 4 Carbs/Meal, Dental Soft, Fluid Restriction   · Oral Diet intake: 1-25%  · Anthropometric Measures:  · Ht: 5' 8\" (172.7 cm)   · Current Body Wt: 127 lb 13.9 oz (58 kg)  · Admission Body Wt: 133 lb 13.1 oz (60.7 kg)  · Ideal Body Wt: 154 lb (69.9 kg), % Ideal Body 87%  · BMI Classification: BMI 18.5 - 24.9 Normal Weight  · Comparative Standards (Estimated Nutrition Needs):  · Estimated Daily Total Kcal: 1830 kcal  · Estimated Daily Protein (g): 79-92 gm    Estimated Intake vs Estimated Needs: Intake Less Than Needs    Nutrition Risk Level: High    Nutrition Interventions:   Continue current diet, Start ONS  Continued Inpatient Monitoring, Swallow Evaluation, Speech Therapy    Nutrition Evaluation:   · Evaluation: No progress toward goals   · Goals: Adequate nutrition intake or provision    · Monitoring: Meal Intake, Supplement Intake, Diet Progression, Weight, Pertinent Labs, Chewing/Swallowing, Skin Integrity, Diet Tolerance    See Adult Nutrition Doc Flowsheet for more detail. Danielito Mariee R.D., L.D.   Clinical Dietitian    [phone]

## 2018-02-14 NOTE — PROGRESS NOTES
Nephrology Progress Note    Subjective/     Patient is being followed by nephrology for the management of acute kidney injury superimposed on chronic kidney disease stage IV. This is a 75 y.o. male active smoker of 2 PPD for many years, hyperlipidemia, COPD and chronic kidney disease stage 4 secondary to hypertension + type 2 DM (baseline creatinine of 2.6-2.8 mg/dl), who presented with decreased urine output and general weakness and was found to have a potassium of 6.0 and BUN/creatinne of 77/3.70 mg/dl. BNP was markedly elevated at 59,238. A bladder scan at bedside showed 1000 cc of urine and insertion of diggs catheter yielded 950 cc of brisk urine. 2-D echocardiogram shows ejection fraction 25% and chest CT scan showed multiple nodules some of which was spiculated reason suspicion for metastatic process. He was transferred back to the ICU due to increasing respiratory distress and is currently on BiPAP with FiO2 50%. Today:  Patient remains on BiPAP seen and examined during dialysis patient is having his third dialysis treatment today. Patient c/o weakness of right Ankle. Objective/     Vitals:    02/14/18 1030 02/14/18 1045 02/14/18 1121 02/14/18 1139   BP: 134/69 138/61  (!) 151/60   Pulse: 82 83     Resp: 16 18 16    Temp:    98.3 °F (36.8 °C)   TempSrc:       SpO2: 96%  96%    Weight:    127 lb 13.9 oz (58 kg)   Height:         24HR INTAKE/OUTPUT:      Intake/Output Summary (Last 24 hours) at 02/14/18 1438  Last data filed at 02/14/18 0548   Gross per 24 hour   Intake             1139 ml   Output              625 ml   Net              514 ml     Patient Vitals for the past 96 hrs (Last 3 readings):   Weight   02/14/18 1139 127 lb 13.9 oz (58 kg)   02/14/18 0800 132 lb 0.9 oz (59.9 kg)   02/14/18 0545 133 lb 2.5 oz (60.4 kg)       Constitutional:  Alert, awake,but in apparent distress  Cardiovascular:  S1, S2 without m/r/g  Respiratory:  Diminished.   Abdomen: +bs, soft, nt  Ext:  LE edema  Right foot drop. Data/  Recent Labs      02/12/18   0545   02/13/18   0522  02/13/18   1938  02/14/18   0436  02/14/18   0823   WBC  14.0*   --   13.9*   --   9.4   --    HGB  9.3*   < >  8.4*  7.9*  8.1*  8.7*   HCT  29.7*   < >  26.6*  24.9*  25.5*  27.4*   MCV  99.5   --   98.7   --   101.0*   --    PLT  169   --   147*   --   135*   --     < > = values in this interval not displayed. Recent Labs      02/12/18   0545  02/13/18   0522  02/14/18   0436 02/14/18   1342   NA  139  138  136   --    K  4.0  4.6  4.1   --    CL  101  100  100   --    CO2  28  28  26   --    GLUCOSE  83  118*  107*   --    MG   --    --    --   1.7   BUN  94*  62*  39*   --    CREATININE  3.88*  3.01*  2.29*   --    LABGLOM  16*  21*  29*   --    GFRAA  19*  25*  35*   --          Assessment/Plan:     1. Acute kidney injury on ckd 4 secondary to obstructive uropathy with significant urinary retention. Patient has a history of '  CK D4 secondary to diabetic and hypertensive nephropathy, patient's kidney function progressed requiring dialysis. QM catheter 2/12/18  Initiated dialysis  Renal USS shows multiple cysts-no calcification  Serologies SPEP, ANAcomplements-neg    #2 chronic kidney disease stage IV secondary to diabetes and hypertensive nephropathy, baseline creatinine 2.6-2.8 mg/dL, at bedtime kidney function has progressed and now on dialysis    2. Dyspnea secondary to acute exacerbation of chronic obstructive pulmonary disease as well as fluid overload secondary to decompensated systolic heart failure. Will monitor response to ultrafiltration. SOB related to AECOPD/fluid overload/AcuteDecompensated CHF with elevated BNP/renal failure     3. Anemia Of chronic kidney disease iron studies consistent with iron deficiency (Ferritin 153 and iron saturation 13%).   Venofer IV     4.  Cachexia with active smoking hx-r/o malignancy/multiple nodules on chest CT.     5.  Proteinuria-5 gms -SPEP-pending , complements negative

## 2018-02-14 NOTE — CARE COORDINATION
ONGOING DISCHARGE PLAN:    Writer attempted to speak to family member in the room, States he \"is his Brother Florentino Feliz & pt's wife will answer all questions\". She will be here later today. Renal functioning improving Bun, 39, Cr 2.29, Has Temp Luis, Surgery following for Possible Tunneled Cath. Did get Dialysis today. Per Pulmonary Notes:  Have been updating his wife, is a DNR CCA  Do not think he is ready for hospice yet (this has been discussed with him in the past)      Will continue to follow for additional discharge needs.     Electronically signed by Shahzad Myrick RN on 2/14/2018 at 11:00 AM

## 2018-02-14 NOTE — PROGRESS NOTES
methylPREDNISolone  40 mg Intravenous Q6H    insulin lispro  0-6 Units Subcutaneous TID WC    insulin lispro  0-3 Units Subcutaneous Nightly    docusate sodium  200 mg Oral Daily    iron sucrose  100 mg Intravenous Q24H    darbepoetin trang-polysorbate  25 mcg Subcutaneous Weekly - Thursday    sodium chloride flush  10 mL Intravenous 2 times per day    ipratropium-albuterol  1 ampule Inhalation Q4H WA    mometasone-formoterol  2 puff Inhalation BID    sodium chloride flush  10 mL Intravenous Q12H     hydrALAZINE, heparin (porcine), heparin (porcine), oxyCODONE-acetaminophen **AND** oxyCODONE, sodium chloride flush, acetaminophen, magnesium hydroxide, bisacodyl, ondansetron, nicotine, albuterol, diazepam, glucose, dextrose, glucagon (rDNA), dextrose, ipratropium-albuterol, sodium chloride flush  IV Drips/Infusions   dextrose         Diet/Nutrition   DIET DYSPHAGIA I PUREED; Low Sodium (2 GM); Nectar Thick; Daily Fluid Restriction: 1500 ml; Renal    Exam      Constitutional - Alert, arousable  General Appearance  well developed, well nourished  HEENT -normocephalic, atraumatic. PERRLA  Lungs - Chest expands equally, no wheezes, rales or rhonchi. Cardiovascular - Heart sounds are normal.  normal rate and rhythm regular, no murmur, gallop or rub. Abdomen - soft, nontender, nondistended, no masses or organomegaly  Neurologic - CN II-XII are grossly intact.  There are no focal motor deficits  Skin - no bruising or bleeding  Extremities - no cyanosis, clubbing or edema    Lab Results   CBC   Lab Results   Component Value Date    WBC 9.4 02/14/2018    RBC 2.53 02/14/2018    HGB 8.1 02/14/2018    HCT 25.5 02/14/2018     02/14/2018    .0 02/14/2018    MCH 32.1 02/14/2018    MCHC 31.7 02/14/2018    RDW 18.3 02/14/2018    LYMPHOPCT 12 02/05/2018    MONOPCT 13 02/05/2018    BASOPCT 1 02/05/2018    MONOSABS 0.80 02/05/2018    LYMPHSABS 0.80 02/05/2018    EOSABS 0.00 02/05/2018    BASOSABS 0.10 02/05/2018

## 2018-02-14 NOTE — PROGRESS NOTES
General Surgery Progress Note            PATIENT NAME: Maria A Joshua     TODAY'S DATE: 2/14/2018, 11:07 AM    SUBJECTIVE:    Pt  seen and examined. Remains on and off BiPAP     OBJECTIVE:   VITALS:  /61   Pulse 83   Temp 98.6 °F (37 °C)   Resp 18   Ht 5' 8\" (1.727 m)   Wt 132 lb 0.9 oz (59.9 kg)   SpO2 96%   BMI 20.08 kg/m²      INTAKE/OUTPUT:      Intake/Output Summary (Last 24 hours) at 02/14/18 1107  Last data filed at 02/14/18 0548   Gross per 24 hour   Intake             1139 ml   Output              625 ml   Net              514 ml                 CONSTITUTIONAL:  awake and alert. no apparent distress  HEART:   Regular  LUNGS:   Clear  ABDOMEN:   Abdomen soft, non-tender, non-distended  EXTREMITIES:   No edema  Luis catheter site is clean and dry.     No issues with dialysis    Data:  CBC:   Lab Results   Component Value Date    WBC 9.4 02/14/2018    RBC 2.53 02/14/2018    HGB 8.7 02/14/2018    HCT 27.4 02/14/2018    .0 02/14/2018    MCH 32.1 02/14/2018    MCHC 31.7 02/14/2018    RDW 18.3 02/14/2018     02/14/2018    MPV 7.8 02/14/2018     BMP:    Lab Results   Component Value Date     02/14/2018    K 4.1 02/14/2018     02/14/2018    CO2 26 02/14/2018    BUN 39 02/14/2018    LABALBU 2.7 02/06/2018    CREATININE 2.29 02/14/2018    CALCIUM 8.6 02/14/2018    GFRAA 35 02/14/2018    LABGLOM 29 02/14/2018    GLUCOSE 107 02/14/2018    GLUCOSE 86 03/27/2012         ASSESSMENT     Principal Problem:    Acute on chronic respiratory failure with hypercapnia (HCC)  Active Problems:    Type 2 diabetes mellitus without complication, with long-term current use of insulin (HCC)    Chronic kidney disease    COPD exacerbation (HCC)    Abdominal aortic aneurysm (AAA) (HCC)    Acute on chronic systolic heart failure (HCC)    Drug-induced hyperglycemia    Anemia    Gastrointestinal hemorrhage    Abdominal wall hernia    Absolute anemia  Resolved Problems:    * No resolved hospital

## 2018-02-14 NOTE — PROGRESS NOTES
Dialysis Safety Checks:    Patient ID Verified (Y/N) y     -Hepatitis Test                   Date      Result  Hepatitis B Surface Antigen   18 neg     Hepatitis B Surface Antibody 18 neg     Hepatitis B Core Antibody        -Treatment Initiation  Blood Vol Processed Goal (Liters)  Pump Speed x Treatment Hours x 60 Minutes 83020   Target Fluid Removal 2500     * Intra-treatment documented Safety Checks include the followin) Access and face visible at all times. 2) All connections and blood lines are secure with no kinks. 3) NVL alarm engaged. 4) Hemosafe device applied (if applicable). 5) No collapse of Arterial or Venous blood chambers. 6) All blood lines / pump segments in the air detectors.   --------------------------------------------------------------------------------

## 2018-02-14 NOTE — CONSULTS
[address] [facility]                   [address] [facility], [address]                                   CONSULTATION    PATIENT NAME: Yvonne Yap                      :        1950  MED REC NO:   [id number]                              ROOM:       2012  ACCOUNT NO:   [de-identified]                           ADMIT DATE: 2018  PROVIDER:     Tim Shannon DATE:  2018    REFERRING PHYSICIAN:  Dr. Raul Greene. HISTORY OF PRESENT ILLNESS:  The patient is a 68-year-old right-handed  gentleman who was admitted to the hospital with respiratory failure  secondary to pneumonia. He has been here for 9 days. Today, the patient  reported weakness of his foot. We have been asked to see him for weakness  of his right foot. He does complain of right proximal limb pain. Denies  numbness. Denies weakness of the upper extremities. Denies weakness of  the left lower extremity. Denies back pain with radicular symptoms. Is a  previous smoker. Also, now known to have diabetes and renal failure. Multiple medical problems identified include diabetes, hyperlipidemia,  COPD, smoking, degenerative joint disease, lumbar degenerative joint  disease, cervical neck pain. Multiple services are involved with his care. Partially edentulous elderly gentleman is seen lying in bed, arouses  easily, but confused and slightly disoriented. Attributed foot numbness to  use of steroids at home before admission. States that he became aware of  his right foot weakness in his dream.    Symmetric facies are noted. Tongue palate and uvula midline. No drift of  the upper extremities. No dysmetria. Jugular catheter on the right side. Strength upper extremity 5-. Diffuse ischemia is noted of the proximal and  distal muscles. 5- upper extremity strength. Bilateral hip flexion right  side 3, left side 4.   Knee flexion, extension 5- left side, 4/5 on

## 2018-02-14 NOTE — PROGRESS NOTES
abdominal pain (Constant). Negative for constipation, nausea and vomiting. Medications: Allergies: Allergies   Allergen Reactions    Codeine     Contrast [Iodides]      IVP dyes       Current Meds:   Scheduled Meds:    hydrALAZINE  25 mg Oral 3 times per day    isosorbide mononitrate  30 mg Oral Daily    vancomycin  750 mg Intravenous Once    carvedilol  6.25 mg Oral BID WC    heparin (porcine)  10,000 Units Intercatheter Once    famotidine (PEPCID) injection  10 mg Intravenous Daily    heparin (porcine)  5,000 Units Subcutaneous 3 times per day    levofloxacin  500 mg Intravenous Q48H    vancomycin (VANCOCIN) intermittent dosing (placeholder)   Other RX Placeholder    methylPREDNISolone  40 mg Intravenous Q6H    insulin lispro  0-6 Units Subcutaneous TID WC    insulin lispro  0-3 Units Subcutaneous Nightly    docusate sodium  200 mg Oral Daily    iron sucrose  100 mg Intravenous Q24H    darbepoetin trang-polysorbate  25 mcg Subcutaneous Weekly - Thursday    sodium chloride flush  10 mL Intravenous 2 times per day    ipratropium-albuterol  1 ampule Inhalation Q4H WA    mometasone-formoterol  2 puff Inhalation BID    sodium chloride flush  10 mL Intravenous Q12H     Continuous Infusions:    dextrose       PRN Meds: hydrALAZINE, heparin (porcine), heparin (porcine), oxyCODONE-acetaminophen **AND** oxyCODONE, sodium chloride flush, acetaminophen, magnesium hydroxide, bisacodyl, ondansetron, nicotine, albuterol, diazepam, glucose, dextrose, glucagon (rDNA), dextrose, ipratropium-albuterol, sodium chloride flush    Data:     Past Medical History:   has a past medical history of Abdominal pain, chronic, generalized; Cervicodynia; Chronic back pain; Chronic kidney disease; Cigarette smoker; COPD (chronic obstructive pulmonary disease) (HCC); DDD (degenerative disc disease), lumbar; Depression; Family history of cancer; Hernia, ventral; Hyperlipidemia; Iron deficiency anemia;  Lichen free air identified in the abdomen or pelvis. Stable findings associated with patient's axillary bifemoral graft with the visualized component of the graft descending along the left chest wall and left flank and a communicating with the left femoral artery with bridge connecting to the right femoral artery. There is focal occlusion of the abdominal aorta inferior to the take-off of the bilateral renal arteries. No retroperitoneal or intramuscular hematoma identified. Pelvis: Manzanares catheter in the urinary bladder. Normal noncontrast CT appearance of the prostate. . The urinary bladder is unremarkable. Bones/soft tissues: Similar appearance of increased sclerosis of the left femoral head may reflect sequelae of prior AVN. No suspicious osseous lesions are identified. 1. No retroperitoneal or intramuscular hematoma. 2. There is new nonspecific prominence of the main portal vein. Could consider Doppler integration for further evaluation, as indicated. 3. There are centrilobular emphysematous changes in the lung bases with peripherally oriented nodular opacities, may reflect atelectatic changes, but true lung nodules are not excluded. Nonemergent chest CT is recommended for further evaluation. 4. Other stable findings, as above. Xr Chest Standard (2 Vw)    Result Date: 2/5/2018  EXAMINATION: TWO VIEWS OF THE CHEST 2/5/2018 8:56 pm COMPARISON: November 24, 2015 HISTORY: ORDERING SYSTEM PROVIDED HISTORY: shortness of breath TECHNOLOGIST PROVIDED HISTORY: Reason for exam:->shortness of breath Ordering Physician Provided Reason for Exam: shortness of breath Acuity: Acute Type of Exam: Initial FINDINGS: Right hilar fullness has increased. Small pleural effusions are suspected. Chronic interstitial lung changes are again seen. Cardiomegaly. No pulmonary edema. Diffuse osteopenia with thoracic spine degenerative changes. Small pleural effusions. Increased right hilar density may reflect underlying adenopathy. posterior tibial and peroneal veins. Normal compressibility of the great  Normal compressibility of the great  saphenous vein. saphenous vein. Normal compressibility of the small  Normal compressibility of the small  saphenous vein. saphenous vein. Velocities are measured in cm/s ; Diameters are measured in mm Right Lower Extremities DVT Study Measurements Right 2D Measurements +------------------------------------+----------+---------------+----------+ ! Location                            ! Visualized! Compressibility! Thrombosis! +------------------------------------+----------+---------------+----------+ ! Common Femoral                      !Yes       ! Yes            ! None      ! +------------------------------------+----------+---------------+----------+ ! Prox Femoral                        !Yes       ! Yes            ! None      ! +------------------------------------+----------+---------------+----------+ ! Mid Femoral                         !Yes       ! Yes            ! None      ! +------------------------------------+----------+---------------+----------+ ! Dist Femoral                        !Yes       ! Yes            ! None      ! +------------------------------------+----------+---------------+----------+ ! Deep Femoral                        !Yes       ! Yes            ! None      ! +------------------------------------+----------+---------------+----------+ ! Popliteal                           !Yes       ! Yes            ! None      ! +------------------------------------+----------+---------------+----------+ ! Sapheno Femoral Junction            ! Yes       ! Yes            ! None      ! +------------------------------------+----------+---------------+----------+ ! PTV                                 ! Partial   !Yes            ! None      ! +------------------------------------+----------+---------------+----------+ ! Peroneal                            !Partial   !Yes !None      ! +------------------------------------+----------+---------------+----------+ ! Gastroc                             ! Yes       ! Yes            ! None      ! +------------------------------------+----------+---------------+----------+ ! GSV Thigh                           ! Yes       ! Yes            ! None      ! +------------------------------------+----------+---------------+----------+ ! GSV Knee                            ! Yes       ! Yes            ! None      ! +------------------------------------+----------+---------------+----------+ ! GSV Ankle                           ! Yes       ! Yes            ! None      ! +------------------------------------+----------+---------------+----------+ ! SSV                                 ! Yes       ! Yes            ! None      ! +------------------------------------+----------+---------------+----------+ Left Lower Extremities DVT Study Measurements Left 2D Measurements +------------------------------------+----------+---------------+----------+ ! Location                            ! Visualized! Compressibility! Thrombosis! +------------------------------------+----------+---------------+----------+ ! Common Femoral                      !Yes       ! Yes            ! None      ! +------------------------------------+----------+---------------+----------+ ! Prox Femoral                        !Yes       ! Yes            ! None      ! +------------------------------------+----------+---------------+----------+ ! Mid Femoral                         !Yes       ! Yes            ! None      ! +------------------------------------+----------+---------------+----------+ ! Dist Femoral                        !Yes       ! Yes            ! None      ! +------------------------------------+----------+---------------+----------+ ! Deep Femoral                        !Yes       ! Yes            ! None      ! +------------------------------------+----------+---------------+----------+ ! Popliteal !Yes       !Yes            ! None      ! +------------------------------------+----------+---------------+----------+ ! Sapheno Femoral Junction            ! Yes       ! Yes            ! None      ! +------------------------------------+----------+---------------+----------+ ! PTV                                 ! Partial   !Yes            ! None      ! +------------------------------------+----------+---------------+----------+ ! Peroneal                            !Partial   !Yes            ! None      ! +------------------------------------+----------+---------------+----------+ ! Gastroc                             ! Yes       ! Yes            ! None      ! +------------------------------------+----------+---------------+----------+ ! GSV Thigh                           ! Yes       ! Yes            ! None      ! +------------------------------------+----------+---------------+----------+ ! GSV Knee                            ! Yes       ! Yes            ! None      ! +------------------------------------+----------+---------------+----------+ ! GSV Ankle                           ! Yes       ! Yes            ! None      ! +------------------------------------+----------+---------------+----------+ ! SSV                                 ! Yes       ! Yes            ! None      ! +------------------------------------+----------+---------------+----------+        Physical Examination:        Physical Exam     Constitutional: No distress. Thin gentleman lying in bed on BiPAP. HENT:   Head: Normocephalic and atraumatic. Eyes: Conjunctivae and EOM are normal. Right eye exhibits no discharge. Left eye exhibits no discharge. Cardiovascular: Normal rate and intact distal pulses.    Breath sounds crackles diffusely, loud  Pulmonary/Chest: Effort normal. No respiratory distress. He has no wheezes. Abdominal: Soft. He exhibits no distension. There is no tenderness. There is no rebound and no guarding.    Multiple surgical scars due to prior surgeries for AAA, anterior fascial defect due to multiple prior surgeries   Musculoskeletal: Normal range of motion. He exhibits no edema, legs are not edematous   Neurological: He is alert and oriented x4. Skin: Skin is warm and dry.  He is not diaphoretic.      Assessment:        Primary Problem  Acute on chronic respiratory failure with hypercapnia [facility])    Active Hospital Problems    Diagnosis Date Noted    Gastrointestinal hemorrhage [K92.2]     Abdominal wall hernia [K43.9]     Absolute anemia [D64.9]     Acute on chronic respiratory failure with hypercapnia ([facility] [address] [J96.22] 2018    Acute on chronic systolic heart failure (HCC) [I50.23] 2018    Drug-induced hyperglycemia [R73.9, T50.905A] 2018    Anemia [D64.9]     Abdominal aortic aneurysm (AAA) ([facility] [place] [address] [I71.4] 2018    COPD exacerbation ([facility]) [J44.1] 2018    Type 2 diabetes mellitus without complication, with long-term current use of insulin ([facility]) [E11.9, Z79.4]     Chronic kidney disease [N18.9]        Plan:        Hypercapnic acute-on-chronic respiratory failure, worsening    - Pulmonology on board   - AB.214/73.1/60.0/29.5/88.3 O2 sat on BiPAP, as of    - Duone[place], BiPAP 12 hours   - Solumedrol 40 mg IV G4Q     Systolic CHF, cardiology sign off   - EF 25%   - -625 mL in last 24h; (-) 2.7L since admission   - HD 2L UF today   - Will need tunneled cath prior to D/C     ?ESRD, ALBERTINA on CKD refractory to Diuresis   - Nephrology on board   - On Aranesp   - HD goal for 2.0 L today   - Will need tunneled cath prior to D/C   - Baseline creatinine ~2.9   - Cr 3.01 -> 2.29   - Began on Dental Soft diet, Renal. Patient aware of aspiration risk     Anemia, 2/2 chronic disease, ?acute loss, likely resolved   - GI on board   - Reticulocyte count 2.1%   - S/p 2u pRBCs with Hgb steady around 8.1-8.6   - Venofer per nephro (8 doses total), last dose      HTN   -  Restart Coreg 6.25 BID, hydralazine

## 2018-02-14 NOTE — PLAN OF CARE
Problem: Discharge Planning:  Goal: Discharged to appropriate level of care  Discharged to appropriate level of care   Outcome: Ongoing  Pt working with  at this time for dialysis placement and discharge placement; will continue to monitor d/c planning. Problem: Activity Intolerance:  Goal: Ability to tolerate increased activity will improve  Ability to tolerate increased activity will improve   Outcome: Ongoing  Patient sitting up at side of bed per self; will continue to monitor for increased activity tolerance. Problem: Airway Clearance - Ineffective:  Goal: Ability to maintain a clear airway will improve  Ability to maintain a clear airway will improve   Outcome: Ongoing  Patient resp rate 21-23 breath/min; pulse ox 98% on HighFlow 40% and 97% on BiPap; lung sounds rhonchi bilaterally; will continue to monitor airway clearance. Problem: Gas Exchange - Impaired:  Goal: Levels of oxygenation will improve  Levels of oxygenation will improve   Outcome: Ongoing  Patient resp rate 21-23 breath/min; pulse ox 98% on HighFlow 40% and 97% on BiPap; lung sounds rhonchi bilaterally; will continue to monitor oxygenation/respiration. Problem: Falls - Risk of  Goal: Absence of falls  Outcome: Ongoing  Pt assessed as a fall risk this shift. Remains free from falls and accidental injury at this time. Fall precautions in place, including falling star sign and fall risk band on pt. Floor free from obstacles, and bed is locked and in lowest position. Adequate lighting provided. Pt encouraged to call before getting OOB for any need. Bed alarm activated. Will continue to monitor needs during hourly rounding, and reinforce education on use of call light. Problem: Pain:  Goal: Pain level will decrease  Pain level will decrease   Outcome: Ongoing  Pt medicated with pain medication prn. Assessed all pain characteristics including level, type, location, frequency, and onset.   Non-pharmacologic

## 2018-02-15 ENCOUNTER — ANESTHESIA (OUTPATIENT)
Dept: OPERATING ROOM | Age: 68
DRG: 673 | End: 2018-02-15
Payer: COMMERCIAL

## 2018-02-15 LAB
ANION GAP SERPL CALCULATED.3IONS-SCNC: 11 MMOL/L (ref 9–17)
BUN BLDV-MCNC: 37 MG/DL (ref 8–23)
BUN/CREAT BLD: ABNORMAL (ref 9–20)
CALCIUM SERPL-MCNC: 8.5 MG/DL (ref 8.6–10.4)
CHLORIDE BLD-SCNC: 97 MMOL/L (ref 98–107)
CO2: 26 MMOL/L (ref 20–31)
CREAT SERPL-MCNC: 2.05 MG/DL (ref 0.7–1.2)
CULTURE: ABNORMAL
CULTURE: ABNORMAL
GFR AFRICAN AMERICAN: 39 ML/MIN
GFR NON-AFRICAN AMERICAN: 32 ML/MIN
GFR SERPL CREATININE-BSD FRML MDRD: ABNORMAL ML/MIN/{1.73_M2}
GFR SERPL CREATININE-BSD FRML MDRD: ABNORMAL ML/MIN/{1.73_M2}
GLUCOSE BLD-MCNC: 113 MG/DL (ref 75–110)
GLUCOSE BLD-MCNC: 143 MG/DL (ref 75–110)
GLUCOSE BLD-MCNC: 144 MG/DL (ref 75–110)
GLUCOSE BLD-MCNC: 167 MG/DL (ref 70–99)
HCT VFR BLD CALC: 24 % (ref 41–53)
HCT VFR BLD CALC: 25.7 % (ref 41–53)
HCT VFR BLD CALC: 26 % (ref 41–53)
HEMOGLOBIN: 7.6 G/DL (ref 13.5–17.5)
HEMOGLOBIN: 7.8 G/DL (ref 13.5–17.5)
HEMOGLOBIN: 7.9 G/DL (ref 13.5–17.5)
Lab: ABNORMAL
MCH RBC QN AUTO: 31.2 PG (ref 26–34)
MCHC RBC AUTO-ENTMCNC: 31.6 G/DL (ref 31–37)
MCV RBC AUTO: 98.6 FL (ref 80–100)
NRBC AUTOMATED: ABNORMAL PER 100 WBC
ORGANISM: ABNORMAL
PDW BLD-RTO: 18 % (ref 11.5–14.9)
PLATELET # BLD: 138 K/UL (ref 150–450)
PMV BLD AUTO: 7.6 FL (ref 6–12)
POTASSIUM SERPL-SCNC: 4.1 MMOL/L (ref 3.7–5.3)
RBC # BLD: 2.43 M/UL (ref 4.5–5.9)
SODIUM BLD-SCNC: 134 MMOL/L (ref 135–144)
SPECIMEN DESCRIPTION: ABNORMAL
STATUS: ABNORMAL
VANCOMYCIN RANDOM DATE LAST DOSE: NORMAL
VANCOMYCIN RANDOM DOSE AMOUNT: NORMAL
VANCOMYCIN RANDOM TIME LAST DOSE: 1520
VANCOMYCIN RANDOM: 15.2 UG/ML
WBC # BLD: 6.7 K/UL (ref 3.5–11)

## 2018-02-15 PROCEDURE — 82947 ASSAY GLUCOSE BLOOD QUANT: CPT

## 2018-02-15 PROCEDURE — S0028 INJECTION, FAMOTIDINE, 20 MG: HCPCS | Performed by: STUDENT IN AN ORGANIZED HEALTH CARE EDUCATION/TRAINING PROGRAM

## 2018-02-15 PROCEDURE — 36415 COLL VENOUS BLD VENIPUNCTURE: CPT

## 2018-02-15 PROCEDURE — 6360000002 HC RX W HCPCS: Performed by: FAMILY MEDICINE

## 2018-02-15 PROCEDURE — 87070 CULTURE OTHR SPECIMN AEROBIC: CPT

## 2018-02-15 PROCEDURE — 6360000002 HC RX W HCPCS: Performed by: STUDENT IN AN ORGANIZED HEALTH CARE EDUCATION/TRAINING PROGRAM

## 2018-02-15 PROCEDURE — 2580000003 HC RX 258: Performed by: STUDENT IN AN ORGANIZED HEALTH CARE EDUCATION/TRAINING PROGRAM

## 2018-02-15 PROCEDURE — 94640 AIRWAY INHALATION TREATMENT: CPT

## 2018-02-15 PROCEDURE — 85014 HEMATOCRIT: CPT

## 2018-02-15 PROCEDURE — 86403 PARTICLE AGGLUT ANTBDY SCRN: CPT

## 2018-02-15 PROCEDURE — 85027 COMPLETE CBC AUTOMATED: CPT

## 2018-02-15 PROCEDURE — 80202 ASSAY OF VANCOMYCIN: CPT

## 2018-02-15 PROCEDURE — 93923 UPR/LXTR ART STDY 3+ LVLS: CPT

## 2018-02-15 PROCEDURE — 6370000000 HC RX 637 (ALT 250 FOR IP): Performed by: NURSE PRACTITIONER

## 2018-02-15 PROCEDURE — 87205 SMEAR GRAM STAIN: CPT

## 2018-02-15 PROCEDURE — 6370000000 HC RX 637 (ALT 250 FOR IP): Performed by: STUDENT IN AN ORGANIZED HEALTH CARE EDUCATION/TRAINING PROGRAM

## 2018-02-15 PROCEDURE — 2580000003 HC RX 258: Performed by: INTERNAL MEDICINE

## 2018-02-15 PROCEDURE — 2500000003 HC RX 250 WO HCPCS: Performed by: STUDENT IN AN ORGANIZED HEALTH CARE EDUCATION/TRAINING PROGRAM

## 2018-02-15 PROCEDURE — 80048 BASIC METABOLIC PNL TOTAL CA: CPT

## 2018-02-15 PROCEDURE — 6360000002 HC RX W HCPCS: Performed by: INTERNAL MEDICINE

## 2018-02-15 PROCEDURE — 94762 N-INVAS EAR/PLS OXIMTRY CONT: CPT

## 2018-02-15 PROCEDURE — 2000000000 HC ICU R&B

## 2018-02-15 PROCEDURE — 6370000000 HC RX 637 (ALT 250 FOR IP): Performed by: FAMILY MEDICINE

## 2018-02-15 PROCEDURE — 94660 CPAP INITIATION&MGMT: CPT

## 2018-02-15 PROCEDURE — 2580000003 HC RX 258: Performed by: NURSE PRACTITIONER

## 2018-02-15 PROCEDURE — 93925 LOWER EXTREMITY STUDY: CPT

## 2018-02-15 PROCEDURE — 87186 SC STD MICRODIL/AGAR DIL: CPT

## 2018-02-15 PROCEDURE — 2580000003 HC RX 258: Performed by: EMERGENCY MEDICINE

## 2018-02-15 PROCEDURE — 6370000000 HC RX 637 (ALT 250 FOR IP): Performed by: INTERNAL MEDICINE

## 2018-02-15 PROCEDURE — 85018 HEMOGLOBIN: CPT

## 2018-02-15 PROCEDURE — 99232 SBSQ HOSP IP/OBS MODERATE 35: CPT | Performed by: INTERNAL MEDICINE

## 2018-02-15 RX ORDER — HEPARIN SODIUM 5000 [USP'U]/ML
5000 INJECTION, SOLUTION INTRAVENOUS; SUBCUTANEOUS EVERY 8 HOURS SCHEDULED
Status: DISCONTINUED | OUTPATIENT
Start: 2018-02-15 | End: 2018-02-19

## 2018-02-15 RX ORDER — HYDRALAZINE HYDROCHLORIDE 50 MG/1
50 TABLET, FILM COATED ORAL EVERY 8 HOURS SCHEDULED
Status: DISCONTINUED | OUTPATIENT
Start: 2018-02-15 | End: 2018-02-27 | Stop reason: HOSPADM

## 2018-02-15 RX ADMIN — DARBEPOETIN ALFA 25 MCG: 25 INJECTION, SOLUTION INTRAVENOUS; SUBCUTANEOUS at 13:31

## 2018-02-15 RX ADMIN — HYDRALAZINE HYDROCHLORIDE 50 MG: 50 TABLET, FILM COATED ORAL at 23:29

## 2018-02-15 RX ADMIN — NAFCILLIN SODIUM 1 G: 1 INJECTION, POWDER, LYOPHILIZED, FOR SOLUTION INTRAMUSCULAR; INTRAVENOUS at 14:51

## 2018-02-15 RX ADMIN — OXYCODONE HYDROCHLORIDE AND ACETAMINOPHEN 1 TABLET: 5; 325 TABLET ORAL at 06:41

## 2018-02-15 RX ADMIN — Medication 10 ML: at 10:01

## 2018-02-15 RX ADMIN — Medication 2 PUFF: at 09:50

## 2018-02-15 RX ADMIN — METHYLPREDNISOLONE SODIUM SUCCINATE 40 MG: 40 INJECTION, POWDER, FOR SOLUTION INTRAMUSCULAR; INTRAVENOUS at 05:59

## 2018-02-15 RX ADMIN — Medication 10 ML: at 19:42

## 2018-02-15 RX ADMIN — Medication 10 ML: at 10:10

## 2018-02-15 RX ADMIN — OXYCODONE HYDROCHLORIDE 5 MG: 5 TABLET ORAL at 19:41

## 2018-02-15 RX ADMIN — HEPARIN SODIUM 5000 UNITS: 5000 INJECTION, SOLUTION INTRAVENOUS; SUBCUTANEOUS at 14:51

## 2018-02-15 RX ADMIN — FAMOTIDINE 10 MG: 10 INJECTION, SOLUTION INTRAVENOUS at 10:01

## 2018-02-15 RX ADMIN — IRON SUCROSE 100 MG: 20 INJECTION, SOLUTION INTRAVENOUS at 17:33

## 2018-02-15 RX ADMIN — METHYLPREDNISOLONE SODIUM SUCCINATE 40 MG: 40 INJECTION, POWDER, FOR SOLUTION INTRAMUSCULAR; INTRAVENOUS at 21:31

## 2018-02-15 RX ADMIN — METHYLPREDNISOLONE SODIUM SUCCINATE 40 MG: 40 INJECTION, POWDER, FOR SOLUTION INTRAMUSCULAR; INTRAVENOUS at 13:29

## 2018-02-15 RX ADMIN — IPRATROPIUM BROMIDE AND ALBUTEROL SULFATE 1 AMPULE: .5; 3 SOLUTION RESPIRATORY (INHALATION) at 20:47

## 2018-02-15 RX ADMIN — IPRATROPIUM BROMIDE AND ALBUTEROL SULFATE 1 AMPULE: .5; 3 SOLUTION RESPIRATORY (INHALATION) at 16:43

## 2018-02-15 RX ADMIN — CARVEDILOL 6.25 MG: 6.25 TABLET, FILM COATED ORAL at 10:01

## 2018-02-15 RX ADMIN — OXYCODONE HYDROCHLORIDE 5 MG: 5 TABLET ORAL at 13:29

## 2018-02-15 RX ADMIN — HYDRALAZINE HYDROCHLORIDE 50 MG: 50 TABLET, FILM COATED ORAL at 14:51

## 2018-02-15 RX ADMIN — ISOSORBIDE MONONITRATE 30 MG: 30 TABLET, EXTENDED RELEASE ORAL at 10:01

## 2018-02-15 RX ADMIN — OXYCODONE HYDROCHLORIDE AND ACETAMINOPHEN 1 TABLET: 5; 325 TABLET ORAL at 13:29

## 2018-02-15 RX ADMIN — NAFCILLIN SODIUM 1 G: 1 INJECTION, POWDER, LYOPHILIZED, FOR SOLUTION INTRAMUSCULAR; INTRAVENOUS at 19:41

## 2018-02-15 RX ADMIN — IPRATROPIUM BROMIDE AND ALBUTEROL SULFATE 1 AMPULE: .5; 3 SOLUTION RESPIRATORY (INHALATION) at 11:10

## 2018-02-15 RX ADMIN — IPRATROPIUM BROMIDE AND ALBUTEROL SULFATE 1 AMPULE: .5; 3 SOLUTION RESPIRATORY (INHALATION) at 07:44

## 2018-02-15 RX ADMIN — Medication 2 PUFF: at 19:42

## 2018-02-15 RX ADMIN — HEPARIN SODIUM 5000 UNITS: 5000 INJECTION, SOLUTION INTRAVENOUS; SUBCUTANEOUS at 21:31

## 2018-02-15 RX ADMIN — FENTANYL CITRATE 25 MCG: 50 INJECTION INTRAMUSCULAR; INTRAVENOUS at 23:28

## 2018-02-15 RX ADMIN — CARVEDILOL 6.25 MG: 6.25 TABLET, FILM COATED ORAL at 17:35

## 2018-02-15 RX ADMIN — HYDRALAZINE HYDROCHLORIDE 25 MG: 10 TABLET, FILM COATED ORAL at 05:58

## 2018-02-15 RX ADMIN — FENTANYL CITRATE 25 MCG: 50 INJECTION INTRAMUSCULAR; INTRAVENOUS at 09:48

## 2018-02-15 RX ADMIN — OXYCODONE HYDROCHLORIDE AND ACETAMINOPHEN 1 TABLET: 5; 325 TABLET ORAL at 19:41

## 2018-02-15 RX ADMIN — OXYCODONE HYDROCHLORIDE AND ACETAMINOPHEN 1 TABLET: 5; 325 TABLET ORAL at 02:03

## 2018-02-15 ASSESSMENT — PAIN DESCRIPTION - LOCATION
LOCATION: LEG

## 2018-02-15 ASSESSMENT — PAIN DESCRIPTION - FREQUENCY
FREQUENCY: CONTINUOUS

## 2018-02-15 ASSESSMENT — PAIN DESCRIPTION - ONSET
ONSET: ON-GOING

## 2018-02-15 ASSESSMENT — PAIN SCALES - GENERAL
PAINLEVEL_OUTOF10: 5
PAINLEVEL_OUTOF10: 5
PAINLEVEL_OUTOF10: 6
PAINLEVEL_OUTOF10: 7
PAINLEVEL_OUTOF10: 5
PAINLEVEL_OUTOF10: 8
PAINLEVEL_OUTOF10: 8
PAINLEVEL_OUTOF10: 7
PAINLEVEL_OUTOF10: 7

## 2018-02-15 ASSESSMENT — PAIN DESCRIPTION - DESCRIPTORS
DESCRIPTORS: CRUSHING

## 2018-02-15 ASSESSMENT — PAIN DESCRIPTION - PAIN TYPE
TYPE: ACUTE PAIN

## 2018-02-15 ASSESSMENT — ENCOUNTER SYMPTOMS
VOMITING: 0
SHORTNESS OF BREATH: 1
NAUSEA: 0
ABDOMINAL PAIN: 1

## 2018-02-15 ASSESSMENT — PAIN DESCRIPTION - ORIENTATION
ORIENTATION: RIGHT

## 2018-02-15 ASSESSMENT — COPD QUESTIONNAIRES: CAT_SEVERITY: NO INTERVAL CHANGE

## 2018-02-15 ASSESSMENT — LIFESTYLE VARIABLES: SMOKING_STATUS: 1

## 2018-02-15 NOTE — PROGRESS NOTES
Pharmacy Vancomycin Consult     Vancomycin Day: 4  Current Dosing: pulse dosing for HD    Temp max:  97.8F    Recent Labs      02/14/18   0436  02/15/18   0426   BUN  39*  37*       Recent Labs      02/14/18   0436  02/15/18   0426   CREATININE  2.29*  2.05*       Recent Labs      02/14/18   0436  02/15/18   0426   WBC  9.4  6.7         Intake/Output Summary (Last 24 hours) at 02/15/18 1212  Last data filed at 02/15/18 0400   Gross per 24 hour   Intake 1465 ml   Output 225 ml   Net 1240 ml       Culture Date      Source                       Results  2/12                     urine                           Staph, Coag neg >100,000 R= levaquin, gentamycin  S =  oxacillin, tetracycline  2/12                      blood x 2                    pending  2/13                      nasal swab                 MRSA positive    Ht Readings from Last 1 Encounters:   02/06/18 5' 8\" (1.727 m)        Wt Readings from Last 1 Encounters:   02/14/18 127 lb 13.9 oz (58 kg)         Body mass index is 19.44 kg/m². Estimated Creatinine Clearance: 28 mL/min (based on SCr of 2.05 mg/dL). Random level 15.2    Assessment/Plan:  NO vancomycin today as patient received 750 mg yesterday  Will order random vancomycin level for tomorrow morning and will dose vancomycin according to level to be given at end of HD    Kevin Conti.   [address]  2/15/2018 12:20 PM

## 2018-02-15 NOTE — PROGRESS NOTES
Dr. Elliot Downey spoke with Dr. Gilford Pearl regarding this patient. Dr. Elliot Downey does not feel that this patient is stable to undergo tunneled cath placement. Pt is DNR-CCA code status, does not want ventilator. Dr. Ronnie Simmons with anethesiology notified by Radha Babin.     Electronically signed by Pedro Diaz RN on 2/15/2018 at 8:12 AM

## 2018-02-15 NOTE — FLOWSHEET NOTE
02/15/18 1515   Encounter Summary   Services provided to: Patient not available  (Sleeping)   Referral/Consult From: Oral   Routine   Type Follow up   Assessment Sleeping

## 2018-02-15 NOTE — ANESTHESIA PRE PROCEDURE
[facility] HF) 45 MCG/ACT inhaler Inhale 1-2 puffs into the lungs every 4 hours as needed.      Yes Historical Provider, MD       Current medications:    Current Facility-Administered Medications   Medication Dose Route Frequency Provider Last Rate Last Dose    hydrALAZINE (APRESOLINE) tablet 25 mg  25 mg Oral 3 times per day Joanna Brown MD        isosorbide mononitrate (IMDUR) extended release tablet 30 mg  30 mg Oral Daily Joanna Brown MD   30 mg at 02/14/18 1302    fentaNYL (SUBLIMAZE) injection 25 mcg  25 mcg Intravenous Q2H PRN Susan Broderick MD   25 mcg at 02/14/18 1730    carvedilol (COREG) tablet 6.25 mg  6.25 mg Oral BID  Fredie Cushing, MD   6.25 mg at 02/14/18 1729    heparin (porcine) injection 10,000 Units  10,000 Units Intercatheter Once [facility] Otaibi, DO        hydrALAZINE (APRESOLINE) injection 10 mg  10 mg Intravenous Q4H PRN Joanna Brown MD   10 mg at 02/14/18 0724    famotidine (PEPCID) injection 10 mg  10 mg Intravenous Daily Joanna Brown MD   10 mg at 02/14/18 1203    heparin (porcine) injection 5,000 Units  5,000 Units Subcutaneous 3 times per day Susan Broderick MD   5,000 Units at 02/14/18 1549    heparin (porcine) injection 1,200 Units  1,200 Units Intercatheter PRN Julissa Villarreal MD   1,200 Units at 02/14/18 1058    heparin (porcine) injection 1,200 Units  1,200 Units Intercatheter PRN Julissa Villarreal MD   1,200 Units at 02/14/18 1058    levofloxacin (LEVAQUIN) 500 MG/100ML infusion 500 mg  500 mg Intravenous Q48H Joanna Brown MD   Stopped at 02/14/18 1252    vancomycin (VANCOCIN) intermittent dosing (placeholder)   Other RX Placeholder Joanna Brown MD        methylPREDNISolone sodium (SOLU-MEDROL) injection 40 mg  40 mg Intravenous Q6H Emily Ashford MD   40 mg at 02/14/18 1822    insulin lispro (HUMALOG) injection vial 0-6 Units  0-6 Units Subcutaneous TID  Joanna Brown MD        insulin lispro (HUMALOG) injection vial 0-3 Units  0-3 Units Subcutaneous Nightly Sanjana Bazzi MD        oxyCODONE-acetaminophen (PERCOCET) 5-325 MG per tablet 1 tablet  1 tablet Oral Q6H PRN Sanjana Bazzi MD   1 tablet at 02/14/18 1909    And    oxyCODONE (ROXICODONE) immediate release tablet 5 mg  5 mg Oral Q6H PRN Sanjana Bazzi MD   5 mg at 02/14/18 1329    docusate sodium (COLACE) capsule 200 mg  200 mg Oral Daily Sanjana Bazzi MD   Stopped at 02/12/18 7098    iron sucrose (VENOFER) 100 mg in sodium chloride 0.9 % 100 mL IVPB  100 mg Intravenous Q24H Glendy Westbrook MD   Stopped at 02/14/18 4717    darbepoetin trang-polysorbate (ARANESP) injection 25 mcg  25 mcg Subcutaneous Weekly - Thursday Glendy Westbrook MD   25 mcg at 02/08/18 0830    sodium chloride flush 0.9 % injection 10 mL  10 mL Intravenous 2 times per day Helen Barnett CNP   10 mL at 02/12/18 2017    sodium chloride flush 0.9 % injection 10 mL  10 mL Intravenous PRN Helen Barnett CNP   10 mL at 02/12/18 1026    acetaminophen (TYLENOL) tablet 650 mg  650 mg Oral Q4H PRN Helen Barnett CNP   650 mg at 02/13/18 0442    magnesium hydroxide (MILK OF MAGNESIA) 400 MG/5ML suspension 30 mL  30 mL Oral Daily PRN Helen Barnett CNP   30 mL at 02/09/18 1002    bisacodyl (DULCOLAX) suppository 10 mg  10 mg Rectal Daily PRN Helen Barnett CNP        ondansetron [facility]) injection 4 mg  4 mg Intravenous Q6H PRN Helen Barnett CNP        nicotine (NICODERM CQ) 21 MG/24HR 1 patch  1 patch Transdermal Daily PRN Helen Barnett CNP        albuterol (PROVENTIL) nebulizer solution 2.5 mg  2.5 mg Nebulization Q2H PRN Helen Barnett CNP   2.5 mg at 02/06/18 0416    ipratropium-albuterol (DUONEB) nebulizer solution 1 ampule  1 ampule Inhalation Q4H RYLIE Painting MD   1 ampule at 02/14/18 1938    mometasone-formoterol (DULERA) 200-5 MCG/ACT inhaler 2 puff  2 puff Inhalation BID Helen Barnett CNP   2 puff at 02/14/18 1909    diazepam (VALIUM) tablet 5 mg  5 mg Oral Q12H PRN Helen Barnett CNP   5 mg at 02/14/18 1933    glucose (GLUTOSE) 40 % oral gel 15 g  15 g Oral PRN Casey Lerma MD        dextrose 50 % solution 12.5 g  12.5 g Intravenous PRN Casey Lerma MD        glucagon (rDNA) injection 1 mg  1 mg Intramuscular PRN Casey Lerma MD        dextrose 5 % solution  100 mL/hr Intravenous PRN Casey Lerma MD        ipratropium-albuterol (DUONEB) nebulizer solution 1 ampule  1 ampule Inhalation As Directed RT PRN Rito Stallworth MD        sodium chloride flush 0.9 % injection 10 mL  10 mL Intravenous Q12H Rito Stallworth MD   10 mL at 02/14/18 1203    sodium chloride flush 0.9 % injection 10 mL  10 mL Intravenous PRN Rito Stallworth MD           Allergies:     Allergies   Allergen Reactions    Codeine     Contrast [Iodides]      IVP dyes       Problem List:    Patient Active Problem List   Diagnosis Code    Type 2 diabetes mellitus without complication, with long-term current use of insulin ([facility]) E11.9, Z79.4    Hyperlipidemia E78.5    COPD (chronic obstructive pulmonary disease) ([facility]) J44.9    Iron deficiency anemia due to chronic blood loss D50.0    Cigarette smoker F17.210    Abdominal pain, chronic, generalized R10.84, G89.29    Hernia, ventral K43.9    DDD (degenerative disc disease), lumbar M51.36    Cervicodynia M54.2    PUD (peptic ulcer disease) K27.9    Chronic kidney disease N18.9    Depression F32.9    Vitamin D deficiency F84.8    Lichen simplex chronicus L28.0    Hyperkalemia E87.5    Family history of cancer Z80.9    COPD exacerbation ([facility]) J44.1    Abdominal aortic aneurysm (AAA) ([facility]) I71.4    Acute on chronic respiratory failure with hypercapnia ([facility]) J96.22    Acute on chronic systolic heart failure ([facility]) I50.23    Drug-induced hyperglycemia R73.9, T50.905A    Anemia D64.9    Gastrointestinal hemorrhage K92.2    Abdominal wall hernia K43.9    Absolute anemia D64.9       Past Medical History:        Diagnosis Date    Abdominal pain, chronic, generalized

## 2018-02-15 NOTE — PROGRESS NOTES
CULTURE (A) 02/12/2018 01:43 PM     STAPHYLOCOCCUS SPECIES, COAGULASE NEGATIVE >[id number] CFU/ML    CULTURE  02/12/2018 01:43 PM     Performed at [address] [facility], [address] [place] [address] [phone]     Radiology:    Ct Abdomen Pelvis Wo Contrast Additional Contrast? Radiologist Recommendation    Result Date: 2/8/2018  EXAMINATION: CT OF THE ABDOMEN AND PELVIS WITHOUT CONTRAST 2/8/2018 11:03 am TECHNIQUE: CT of the abdomen and pelvis was performed without the administration of intravenous contrast. Multiplanar reformatted images are provided for review. Dose modulation, iterative reconstruction, and/or weight based adjustment of the mA/kV was utilized to reduce the radiation dose to as low as reasonably achievable. COMPARISON: CT abdomen/ pelvis from 01/18/2010. HISTORY: ORDERING SYSTEM PROVIDED HISTORY: R/O Retroperitoneal bleed. HbG continues to drop s/p 2 U PRBCs TECHNOLOGIST PROVIDED HISTORY: Additional Contrast?->Radiologist Recommendation Ordering Physician Provided Reason for Exam: DR LOOKING FOR A BLED. BLOOD COUNTS ARE GOING DOWN. PT IS ALLERGIC TO IV CONTRAST. GFR IS TOO LOW TO INJECT. FINDINGS: Lung bases: There is progression of centrilobular emphysematous changes in the lung bases. There are peripheral nodular opacities, may reflect atelectasis versus true lung nodules. No pleural or pericardial effusion. There is moderate multichamber cardiac enlargement. Organs: The liver has normal size and contours. No focal intrahepatic mass lesion identified. No extrahepatic biliary ductal dilatation. The gallbladder is present. The main portal vein is prominent in appearance. The spleen, pancreas and adrenal glands have a normal noncontrast CT appearance. The kidneys are symmetric in size and noncontrast appearance.   There are cortical hypodensities in the bilateral kidneys, which are incompletely evaluated on the basis of this noncontrast study, but all have attenuation characteristics consistent with simple fluid and statistically most likely relate to benign renal cysts. Nonemergent renal ultrasound could be considered for further evaluation, as clinically indicated. No suspicious renal lesions identified. No obstructive uropathy. GI/bowel:  Similar appearance of ligamentous laxity versus large anterior abdominal wall defect containing small and large bowel loops. There is a large stool burden in the colon. No dilated loops of bowel, or findings to suggest obstruction. No mural thickening or adjacent inflammatory changes identified. An appendix is not confidently identified but there are no inflammatory changes identified in the right lower quadrant of the abdomen. . Peritoneum/retroperitoneum:  No lymphadenopathy, free fluid or free air identified in the abdomen or pelvis. Stable findings associated with patient's axillary bifemoral graft with the visualized component of the graft descending along the left chest wall and left flank and a communicating with the left femoral artery with bridge connecting to the right femoral artery. There is focal occlusion of the abdominal aorta inferior to the take-off of the bilateral renal arteries. No retroperitoneal or intramuscular hematoma identified. Pelvis: Manzanares catheter in the urinary bladder. Normal noncontrast CT appearance of the prostate. . The urinary bladder is unremarkable. Bones/soft tissues: Similar appearance of increased sclerosis of the left femoral head may reflect sequelae of prior AVN. No suspicious osseous lesions are identified. 1. No retroperitoneal or intramuscular hematoma. 2. There is new nonspecific prominence of the main portal vein. Could consider Doppler integration for further evaluation, as indicated. 3. There are centrilobular emphysematous changes in the lung bases with peripherally oriented nodular opacities, may reflect atelectatic changes, but true lung nodules are not excluded.   Nonemergent chest CT is

## 2018-02-15 NOTE — ANESTHESIA PRE PROCEDURE
Department of Anesthesiology  Preprocedure Note       Name:  Maria A Joshua   Age:  75 y.o.  :  1950                                          MRN:  [id number]         Date:  2/15/2018      Surgeon: Jostin Olivares):  Agustin Grier DO    Procedure: Procedure(s):  CATHETER INSERTION HEMODIALYSIS    Medications prior to admission:   Prior to Admission medications    Medication Sig Start Date End Date Taking? Authorizing Provider   carvedilol (COREG) 6.25 MG tablet Take 6.25 mg by mouth 2 times daily (with meals)   Yes Historical Provider, MD   hydrALAZINE (APRESOLINE) 10 MG tablet Take 10 mg by mouth 3 times daily   Yes Historical Provider, MD   sodium bicarbonate 650 MG tablet Take 650 mg by mouth 2 times daily   Yes Historical Provider, MD   tamsulosin (FLOMAX) 0.4 MG capsule Take 0.4 mg by mouth daily   Yes Historical Provider, MD   oxyCODONE-acetaminophen (PERCOCET)  MG per tablet  16  Yes Historical Provider, MD   escitalopram (LEXAPRO) 20 MG tablet Take 20 mg by mouth daily. Yes Historical Provider, MD   famotidine (PEPCID) 20 MG tablet Take 20 mg by mouth 2 times daily. Yes Historical Provider, MD   aspirin 81 MG EC tablet Take 81 mg by mouth daily. Yes Historical Provider, MD   Iron (FEOSOL PO) Take 325 mg by mouth 2 times daily. Yes Historical Provider, MD   therapeutic multivitamin-minerals (THERAGRAN-M) tablet Take 1 tablet by mouth daily. Yes Historical Provider, MD   rosuvastatin (CRESTOR) 20 MG tablet Take 20 mg by mouth daily. Yes Historical Provider, MD   albuterol (PROVENTIL) (2.5 MG/3ML) 0.083% nebulizer solution Take 2.5 mg by nebulization every 6 hours as needed. Yes Historical Provider, MD   fluticasone-salmeterol (ADVAIR DISKUS) 500-50 MCG/DOSE diskus inhaler Inhale 1 puff into the lungs 2 times daily. Yes Historical Provider, MD   tiotropium (SPIRIVA) 18 MCG inhalation capsule Inhale 18 mcg into the lungs daily.      Yes Historical Provider, MD   levalbuterol [facility] HF) 45 MCG/ACT inhaler Inhale 1-2 puffs into the lungs every 4 hours as needed.      Yes Historical Provider, MD       Current medications:    Current Facility-Administered Medications   Medication Dose Route Frequency Provider Last Rate Last Dose    hydrALAZINE (APRESOLINE) tablet 25 mg  25 mg Oral 3 times per day Theo Ruiz MD   25 mg at 02/15/18 0558    isosorbide mononitrate (IMDUR) extended release tablet 30 mg  30 mg Oral Daily Theo Ruiz MD   30 mg at 02/14/18 1302    fentaNYL (SUBLIMAZE) injection 25 mcg  25 mcg Intravenous Q2H PRN Jc Shanks MD   25 mcg at 02/14/18 2350    carvedilol (COREG) tablet 6.25 mg  6.25 mg Oral BID  Taqueria Monique MD   6.25 mg at 02/14/18 1729    heparin (porcine) injection 10,000 Units  10,000 Units Intercatheter Once [facility] Otaibi, DO        hydrALAZINE (APRESOLINE) injection 10 mg  10 mg Intravenous Q4H PRN Theo Ruiz MD   10 mg at 02/14/18 0724    famotidine (PEPCID) injection 10 mg  10 mg Intravenous Daily Theo Ruiz MD   10 mg at 02/14/18 1203    heparin (porcine) injection 5,000 Units  5,000 Units Subcutaneous 3 times per day Jc Shanks MD   5,000 Units at 02/14/18 2222    heparin (porcine) injection 1,200 Units  1,200 Units Intercatheter PRN Srinivasan Bright MD   1,200 Units at 02/14/18 1058    heparin (porcine) injection 1,200 Units  1,200 Units Intercatheter PRN Srinivasan Bright MD   1,200 Units at 02/14/18 1058    levofloxacin (LEVAQUIN) 500 MG/100ML infusion 500 mg  500 mg Intravenous Q48H Theo Ruiz MD   Stopped at 02/14/18 1252    vancomycin (VANCOCIN) intermittent dosing (placeholder)   Other RX Placeholder Theo Ruiz MD        methylPREDNISolone sodium (SOLU-MEDROL) injection 40 mg  40 mg Intravenous Q6H Emely Hansen MD   40 mg at 02/15/18 0559    insulin lispro (HUMALOG) injection vial 0-6 Units  0-6 Units Subcutaneous TID  Theo Ruiz MD   Stopped at 02/15/18 0732    insulin lispro (HUMALOG) injection vial 0-3 Units  0-3 Units Subcutaneous Nightly Sarina Benietz MD        oxyCODONE-acetaminophen (PERCOCET) 5-325 MG per tablet 1 tablet  1 tablet Oral Q6H PRN Sarina Benitez MD   1 tablet at 02/15/18 0641    And    oxyCODONE (ROXICODONE) immediate release tablet 5 mg  5 mg Oral Q6H PRN Sarina Benitez MD   5 mg at 02/14/18 2017    docusate sodium (COLACE) capsule 200 mg  200 mg Oral Daily Sarina Benitez MD   Stopped at 02/12/18 5707    iron sucrose (VENOFER) 100 mg in sodium chloride 0.9 % 100 mL IVPB  100 mg Intravenous Q24H Kumar Dennison MD   Stopped at 02/14/18 2748    darbepoetin trang-polysorbate (ARANESP) injection 25 mcg  25 mcg Subcutaneous Weekly - Thursday Kumar Miller MD   25 mcg at 02/08/18 0830    sodium chloride flush 0.9 % injection 10 mL  10 mL Intravenous 2 times per day Najma Modi CNP   10 mL at 02/14/18 2006    sodium chloride flush 0.9 % injection 10 mL  10 mL Intravenous PRN Najma Modi CNP   10 mL at 02/12/18 1026    acetaminophen (TYLENOL) tablet 650 mg  650 mg Oral Q4H PRN Najma Modi CNP   650 mg at 02/13/18 0442    magnesium hydroxide (MILK OF MAGNESIA) 400 MG/5ML suspension 30 mL  30 mL Oral Daily PRN Najma Moid CNP   30 mL at 02/09/18 1002    bisacodyl (DULCOLAX) suppository 10 mg  10 mg Rectal Daily PRN Najma Modi CNP        ondansetron [facility]) injection 4 mg  4 mg Intravenous Q6H PRN Najma Modi CNP        nicotine (NICODERM CQ) 21 MG/24HR 1 patch  1 patch Transdermal Daily PRN Najma Modi CNP        albuterol (PROVENTIL) nebulizer solution 2.5 mg  2.5 mg Nebulization Q2H PRN Najma Modi CNP   2.5 mg at 02/06/18 0416    ipratropium-albuterol (DUONEB) nebulizer solution 1 ampule  1 ampule Inhalation Q4H RYLIE Florentino MD   1 ampule at 02/15/18 0744    mometasone-formoterol (DULERA) 200-5 MCG/ACT inhaler 2 puff  2 puff Inhalation BID Najma Modi CNP   2 puff at 02/14/18 1908    diazepam (VALIUM) tablet 5 mg 5 mg Oral Q12H PRN Kelsey Crandall, CNP   5 mg at 02/14/18 1933    glucose (GLUTOSE) 40 % oral gel 15 g  15 g Oral PRN Yvonne Jacobs MD        dextrose 50 % solution 12.5 g  12.5 g Intravenous PRN Yvonne Jacobs MD        glucagon (rDNA) injection 1 mg  1 mg Intramuscular PRN Yvonne Jacobs MD        dextrose 5 % solution  100 mL/hr Intravenous PRN Yvonne Jacobs MD        ipratropium-albuterol (DUONEB) nebulizer solution 1 ampule  1 ampule Inhalation As Directed RT PRN Damian Prather MD        sodium chloride flush 0.9 % injection 10 mL  10 mL Intravenous Q12H Damian Prather MD   10 mL at 02/14/18 1203    sodium chloride flush 0.9 % injection 10 mL  10 mL Intravenous PRN Damian Prather MD           Allergies:     Allergies   Allergen Reactions    Codeine     Contrast [Iodides]      IVP dyes       Problem List:    Patient Active Problem List   Diagnosis Code    Type 2 diabetes mellitus without complication, with long-term current use of insulin ([facility]) E11.9, Z79.4    Hyperlipidemia E78.5    COPD (chronic obstructive pulmonary disease) ([facility]) J44.9    Iron deficiency anemia due to chronic blood loss D50.0    Cigarette smoker F17.210    Abdominal pain, chronic, generalized R10.84, G89.29    Hernia, ventral K43.9    DDD (degenerative disc disease), lumbar M51.36    Cervicodynia M54.2    PUD (peptic ulcer disease) K27.9    Chronic kidney disease N18.9    Depression F32.9    Vitamin D deficiency C44.3    Lichen simplex chronicus 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

## 2018-02-15 NOTE — PROGRESS NOTES
Dr. Peg Flores notified regarding new consult for occluded fem pop bypass.     Electronically signed by Dianne Burdick RN on 2/15/2018 at 5:29 PM

## 2018-02-15 NOTE — PROGRESS NOTES
drop.    Data/  Recent Labs      02/13/18   0522 02/14/18 0436   02/14/18   1959  02/15/18   0426  02/15/18   0821   WBC  13.9*   --   9.4   --    --   6.7   --    HGB  8.4*   < >  8.1*   < >  7.9*  7.6*  7.9*   HCT  26.6*   < >  25.5*   < >  24.9*  24.0*  26.0*   MCV  98.7   --   101.0*   --    --   98.6   --    PLT  147*   --   135*   --    --   138*   --     < > = values in this interval not displayed. Recent Labs      02/13/18   0522 02/14/18 0436  02/14/18   1342  02/15/18   0426   NA  138  136   --   134*   K  4.6  4.1   --   4.1   CL  100  100   --   97*   CO2  28  26   --   26   GLUCOSE  118*  107*   --   167*   MG   --    --   1.7   --    BUN  62*  39*   --   37*   CREATININE  3.01*  2.29*   --   2.05*   LABGLOM  21*  29*   --   32*   GFRAA  25*  35*   --   39*         Assessment/Plan:     1. Acute kidney injury on ckd 4 secondary to obstructive uropathy with significant urinary retention. Patient has a history of '  CK D4 secondary to diabetic and hypertensive nephropathy, patient's kidney function progressed requiring dialysis. QM catheter 2/12/18  Initiated dialysis  Renal USS shows multiple cysts-no calcification  Serologies SPEP, ANAcomplements-neg    #2 chronic kidney disease stage IV secondary to diabetes and hypertensive nephropathy, baseline creatinine 2.6-2.8 mg/dL, at bedtime kidney function has progressed and now on dialysis    2. Dyspnea secondary to acute exacerbation of chronic obstructive pulmonary disease as well as fluid overload secondary to decompensated systolic heart failure. Will monitor response to ultrafiltration. SOB related to AECOPD/fluid overload/AcuteDecompensated CHF with elevated BNP/renal failure     3. Anemia Of chronic kidney disease iron studies consistent with iron deficiency (Ferritin 153 and iron saturation 13%).   Venofer IV     4.  Cachexia with active smoking hx-r/o malignancy/multiple nodules on chest CT.     5.  Proteinuria-5 gms -SPEP-pending , complements negative     6. Hypertension, Not optimally controlled. On po Hydralazine, coreg. Agree with Hydralazine at 50 mg tid, can titrate according to BP. Plan:  Hemodialysis tomorrow. Tunnel catheter before discharge when respiratory status is stable. Consult  for outpatient dialysis unit.         Anjali PENA.  Attending Nephrologist

## 2018-02-15 NOTE — PROGRESS NOTES
Dr. Lyndsay Bingham at bedside to evaluate pt. No intervention at this time. Dr. Leigh Ann Taylor notified.     Electronically signed by Demetria Soulier, RN on 2/15/2018 at 6:44 PM

## 2018-02-15 NOTE — PROGRESS NOTES
Dr. Patti James calls in to check on pt, updated on pulm status, will have tunnel cath placed tomorrow

## 2018-02-15 NOTE — PROGRESS NOTES
Daily    carvedilol  6.25 mg Oral BID WC    heparin (porcine)  10,000 Units Intercatheter Once    famotidine (PEPCID) injection  10 mg Intravenous Daily    heparin (porcine)  5,000 Units Subcutaneous 3 times per day    levofloxacin  500 mg Intravenous Q48H    vancomycin (VANCOCIN) intermittent dosing (placeholder)   Other RX Placeholder    methylPREDNISolone  40 mg Intravenous Q6H    insulin lispro  0-6 Units Subcutaneous TID WC    insulin lispro  0-3 Units Subcutaneous Nightly    docusate sodium  200 mg Oral Daily    iron sucrose  100 mg Intravenous Q24H    darbepoetin trang-polysorbate  25 mcg Subcutaneous Weekly - Thursday    sodium chloride flush  10 mL Intravenous 2 times per day    ipratropium-albuterol  1 ampule Inhalation Q4H WA    mometasone-formoterol  2 puff Inhalation BID    sodium chloride flush  10 mL Intravenous Q12H     fentanNYL, hydrALAZINE, heparin (porcine), heparin (porcine), oxyCODONE-acetaminophen **AND** oxyCODONE, sodium chloride flush, acetaminophen, magnesium hydroxide, bisacodyl, ondansetron, nicotine, albuterol, diazepam, glucose, dextrose, glucagon (rDNA), dextrose, ipratropium-albuterol, sodium chloride flush  IV Drips/Infusions   dextrose         Diet/Nutrition   Diet NPO Effective Now Exceptions are: Sips with Meds    Exam      Constitutional - Alert, arousable  General Appearance  well developed, well nourished  HEENT -normocephalic, atraumatic. PERRLA  Lungs - Chest expands equally, Bilateral wheezes with rhonchi. Cardiovascular - Heart sounds are normal.  normal rate and rhythm regular, no murmur, gallop or rub.   Abdomen - soft, nontender, nondistended, no masses or organomegaly  Neurologic - alert and oriented ×3  Skin - no bruising or bleeding  Extremities - no cyanosis, clubbing or edema    Lab Results   CBC     Lab Results   Component Value Date    WBC 6.7 02/15/2018    RBC 2.43 02/15/2018    HGB 7.6 02/15/2018    HCT 24.0 02/15/2018     02/15/2018 MCV 98.6 02/15/2018    MCH 31.2 02/15/2018    MCHC 31.6 02/15/2018    RDW 18.0 02/15/2018    LYMPHOPCT 12 02/05/2018    MONOPCT 13 02/05/2018    BASOPCT 1 02/05/2018    MONOSABS 0.80 02/05/2018    LYMPHSABS 0.80 02/05/2018    EOSABS 0.00 02/05/2018    BASOSABS 0.10 02/05/2018    DIFFTYPE NOT REPORTED 02/05/2018       BMP   Lab Results   Component Value Date     02/15/2018    K 4.1 02/15/2018    CL 97 02/15/2018    CO2 26 02/15/2018    BUN 37 02/15/2018    CREATININE 2.05 02/15/2018    GLUCOSE 167 02/15/2018    GLUCOSE 86 03/27/2012       LFTS  Lab Results   Component Value Date    ALKPHOS 57 02/06/2018    ALT 14 02/06/2018    AST 13 02/06/2018    PROT 5.1 02/14/2018    BILITOT <0.15 02/06/2018    BILIDIR <0.08 02/06/2018    IBILI CANNOT BE CALCULATED 02/06/2018    LABALBU 2.7 02/06/2018       ABG ABGs:   Lab Results   Component Value Date    PHART 7.310 02/13/2018    PO2ART 65.8 02/13/2018    AOK1XNV 56.6 02/13/2018       Lab Results   Component Value Date    MODE 18/8 02/13/2018         INR  No results for input(s): PROTIME, INR in the last 72 hours. APTT  No results for input(s): APTT in the last 72 hours. Lactic Acid  No results found for: LACTA     BNP   No results for input(s): BNP in the last 72 hours. Cultures       Radiology     CXR      Chest x-ray stable without any new infiltrates or effusions  SYSTEMS ASSESSMENT    #1 acute on chronic hypercapnic respiratory failure secondary to COPD and volume overload  #2 acute on chronic renal failure  #3 multiple pulmonary nodules on the chest, infectious versus malignant etiology   (Patient missed multiple CAT scans of the chest which had been scheduled)  #4 DNR CCA no intubation    Neuro   Continues to become more alert    Respiratory   Wean oxygen as tolerated.  Keep O2 sat > 88%  Continue to alternate between bilevel pressure and high flow oxygen   Wean off high flow if possible  He needs to wear the BiPAP at night  Cardiovascular   Will increase hydralazine from 25 mg to 50 mg 3 times a day. Gastrointestinal   Tolerating a renal diet    Renal   Dr. Platt Left spoke with general surgery in regards to deferring tunneled cath placement until patient is more stable. He continues to get dialysis to keep volume off  Nephrology consult    Infectious Disease   MRSA swab positive  Urine culture pending  Continue Levaquin and vancomycin    Hematology/Oncology   Continue to monitor H&H's    Endocrine   Blood sugars Well controlled    Social/Spiritual/DNR/Disposition/Other   Have been updating his wife, is a DNR CCA      Critical Care Time   35 min    Will discuss plan with attending: Dr. Platt Left    Electronically signed by Queenie Davis MD on 2/15/2018 at 8:35 AM      Patient seen and examined independently by me. Above discussed and I agree with resident note except where indicated in the EMR revision history. Also see my additional comments and changes indicated by discrete font, text color, italics, and/or initials. Labs, cultures, and radiographs where available were reviewed.      Not stable for tunnel catheter, due to the fact that he may require ventilator and he does not want a ventilator  His pulmonary status is very marginal  Will try to wean him off his high flow  Dialysis per nephrology  Electronically signed by Stepan Angelo MD on 2/15/2018 at 9:49 AM

## 2018-02-16 LAB
ANION GAP SERPL CALCULATED.3IONS-SCNC: 13 MMOL/L (ref 9–17)
BUN BLDV-MCNC: 62 MG/DL (ref 8–23)
BUN/CREAT BLD: ABNORMAL (ref 9–20)
CALCIUM SERPL-MCNC: 8.9 MG/DL (ref 8.6–10.4)
CHLORIDE BLD-SCNC: 98 MMOL/L (ref 98–107)
CO2: 24 MMOL/L (ref 20–31)
CREAT SERPL-MCNC: 2.94 MG/DL (ref 0.7–1.2)
GFR AFRICAN AMERICAN: 26 ML/MIN
GFR NON-AFRICAN AMERICAN: 21 ML/MIN
GFR SERPL CREATININE-BSD FRML MDRD: ABNORMAL ML/MIN/{1.73_M2}
GFR SERPL CREATININE-BSD FRML MDRD: ABNORMAL ML/MIN/{1.73_M2}
GLUCOSE BLD-MCNC: 113 MG/DL (ref 75–110)
GLUCOSE BLD-MCNC: 123 MG/DL (ref 70–99)
GLUCOSE BLD-MCNC: 134 MG/DL (ref 75–110)
GLUCOSE BLD-MCNC: 88 MG/DL (ref 75–110)
HCT VFR BLD CALC: 26.9 % (ref 41–53)
HEMOGLOBIN: 8.5 G/DL (ref 13.5–17.5)
MCH RBC QN AUTO: 32 PG (ref 26–34)
MCHC RBC AUTO-ENTMCNC: 31.8 G/DL (ref 31–37)
MCV RBC AUTO: 100.8 FL (ref 80–100)
NRBC AUTOMATED: ABNORMAL PER 100 WBC
PDW BLD-RTO: 18.5 % (ref 11.5–14.9)
PLATELET # BLD: 140 K/UL (ref 150–450)
PMV BLD AUTO: 8.5 FL (ref 6–12)
POTASSIUM SERPL-SCNC: 4.5 MMOL/L (ref 3.7–5.3)
RBC # BLD: 2.67 M/UL (ref 4.5–5.9)
SODIUM BLD-SCNC: 135 MMOL/L (ref 135–144)
WBC # BLD: 6.4 K/UL (ref 3.5–11)

## 2018-02-16 PROCEDURE — 80048 BASIC METABOLIC PNL TOTAL CA: CPT

## 2018-02-16 PROCEDURE — 85027 COMPLETE CBC AUTOMATED: CPT

## 2018-02-16 PROCEDURE — 6360000002 HC RX W HCPCS: Performed by: STUDENT IN AN ORGANIZED HEALTH CARE EDUCATION/TRAINING PROGRAM

## 2018-02-16 PROCEDURE — 6370000000 HC RX 637 (ALT 250 FOR IP): Performed by: NURSE PRACTITIONER

## 2018-02-16 PROCEDURE — 2580000003 HC RX 258: Performed by: NURSE PRACTITIONER

## 2018-02-16 PROCEDURE — 6360000002 HC RX W HCPCS: Performed by: FAMILY MEDICINE

## 2018-02-16 PROCEDURE — 2060000000 HC ICU INTERMEDIATE R&B

## 2018-02-16 PROCEDURE — 6370000000 HC RX 637 (ALT 250 FOR IP): Performed by: STUDENT IN AN ORGANIZED HEALTH CARE EDUCATION/TRAINING PROGRAM

## 2018-02-16 PROCEDURE — 6370000000 HC RX 637 (ALT 250 FOR IP): Performed by: FAMILY MEDICINE

## 2018-02-16 PROCEDURE — 82947 ASSAY GLUCOSE BLOOD QUANT: CPT

## 2018-02-16 PROCEDURE — 94762 N-INVAS EAR/PLS OXIMTRY CONT: CPT

## 2018-02-16 PROCEDURE — 94660 CPAP INITIATION&MGMT: CPT

## 2018-02-16 PROCEDURE — 6370000000 HC RX 637 (ALT 250 FOR IP): Performed by: INTERNAL MEDICINE

## 2018-02-16 PROCEDURE — 6360000002 HC RX W HCPCS: Performed by: INTERNAL MEDICINE

## 2018-02-16 PROCEDURE — 99233 SBSQ HOSP IP/OBS HIGH 50: CPT | Performed by: INTERNAL MEDICINE

## 2018-02-16 PROCEDURE — 2500000003 HC RX 250 WO HCPCS: Performed by: STUDENT IN AN ORGANIZED HEALTH CARE EDUCATION/TRAINING PROGRAM

## 2018-02-16 PROCEDURE — 36415 COLL VENOUS BLD VENIPUNCTURE: CPT

## 2018-02-16 PROCEDURE — 2580000003 HC RX 258: Performed by: INTERNAL MEDICINE

## 2018-02-16 PROCEDURE — 94640 AIRWAY INHALATION TREATMENT: CPT

## 2018-02-16 PROCEDURE — S0028 INJECTION, FAMOTIDINE, 20 MG: HCPCS | Performed by: STUDENT IN AN ORGANIZED HEALTH CARE EDUCATION/TRAINING PROGRAM

## 2018-02-16 PROCEDURE — 2580000003 HC RX 258: Performed by: STUDENT IN AN ORGANIZED HEALTH CARE EDUCATION/TRAINING PROGRAM

## 2018-02-16 PROCEDURE — 99221 1ST HOSP IP/OBS SF/LOW 40: CPT | Performed by: SURGERY

## 2018-02-16 PROCEDURE — 94664 DEMO&/EVAL PT USE INHALER: CPT

## 2018-02-16 PROCEDURE — 90937 HEMODIALYSIS REPEATED EVAL: CPT

## 2018-02-16 PROCEDURE — 97162 PT EVAL MOD COMPLEX 30 MIN: CPT

## 2018-02-16 PROCEDURE — 97110 THERAPEUTIC EXERCISES: CPT

## 2018-02-16 RX ORDER — METHYLPREDNISOLONE SODIUM SUCCINATE 40 MG/ML
40 INJECTION, POWDER, LYOPHILIZED, FOR SOLUTION INTRAMUSCULAR; INTRAVENOUS EVERY 8 HOURS
Status: DISCONTINUED | OUTPATIENT
Start: 2018-02-16 | End: 2018-02-17

## 2018-02-16 RX ORDER — LEVOFLOXACIN 5 MG/ML
500 INJECTION, SOLUTION INTRAVENOUS ONCE
Status: COMPLETED | OUTPATIENT
Start: 2018-02-16 | End: 2018-02-16

## 2018-02-16 RX ORDER — LINEZOLID 2 MG/ML
600 INJECTION, SOLUTION INTRAVENOUS EVERY 12 HOURS
Status: DISCONTINUED | OUTPATIENT
Start: 2018-02-16 | End: 2018-02-20

## 2018-02-16 RX ORDER — LEVOFLOXACIN 5 MG/ML
250 INJECTION, SOLUTION INTRAVENOUS
Status: DISCONTINUED | OUTPATIENT
Start: 2018-02-18 | End: 2018-02-20

## 2018-02-16 RX ADMIN — LINEZOLID 600 MG: 600 INJECTION, SOLUTION INTRAVENOUS at 12:54

## 2018-02-16 RX ADMIN — OXYCODONE HYDROCHLORIDE 5 MG: 5 TABLET ORAL at 23:34

## 2018-02-16 RX ADMIN — OXYCODONE HYDROCHLORIDE 5 MG: 5 TABLET ORAL at 12:53

## 2018-02-16 RX ADMIN — NAFCILLIN SODIUM 1 G: 1 INJECTION, POWDER, LYOPHILIZED, FOR SOLUTION INTRAMUSCULAR; INTRAVENOUS at 03:31

## 2018-02-16 RX ADMIN — NAFCILLIN SODIUM 1 G: 1 INJECTION, POWDER, LYOPHILIZED, FOR SOLUTION INTRAMUSCULAR; INTRAVENOUS at 00:00

## 2018-02-16 RX ADMIN — IRON SUCROSE 100 MG: 20 INJECTION, SOLUTION INTRAVENOUS at 17:13

## 2018-02-16 RX ADMIN — DIAZEPAM 5 MG: 5 TABLET ORAL at 23:42

## 2018-02-16 RX ADMIN — HYDRALAZINE HYDROCHLORIDE 50 MG: 50 TABLET, FILM COATED ORAL at 06:25

## 2018-02-16 RX ADMIN — Medication 2 PUFF: at 11:22

## 2018-02-16 RX ADMIN — IPRATROPIUM BROMIDE AND ALBUTEROL SULFATE 1 AMPULE: .5; 3 SOLUTION RESPIRATORY (INHALATION) at 12:01

## 2018-02-16 RX ADMIN — FAMOTIDINE 10 MG: 10 INJECTION, SOLUTION INTRAVENOUS at 11:22

## 2018-02-16 RX ADMIN — HEPARIN SODIUM 5000 UNITS: 5000 INJECTION, SOLUTION INTRAVENOUS; SUBCUTANEOUS at 21:43

## 2018-02-16 RX ADMIN — CARVEDILOL 6.25 MG: 6.25 TABLET, FILM COATED ORAL at 17:14

## 2018-02-16 RX ADMIN — Medication 10 ML: at 11:28

## 2018-02-16 RX ADMIN — ISOSORBIDE MONONITRATE 30 MG: 30 TABLET, EXTENDED RELEASE ORAL at 11:23

## 2018-02-16 RX ADMIN — IPRATROPIUM BROMIDE AND ALBUTEROL SULFATE 1 AMPULE: .5; 3 SOLUTION RESPIRATORY (INHALATION) at 20:28

## 2018-02-16 RX ADMIN — OXYCODONE HYDROCHLORIDE AND ACETAMINOPHEN 1 TABLET: 5; 325 TABLET ORAL at 04:39

## 2018-02-16 RX ADMIN — FENTANYL CITRATE 25 MCG: 50 INJECTION INTRAMUSCULAR; INTRAVENOUS at 14:46

## 2018-02-16 RX ADMIN — HEPARIN SODIUM 1200 UNITS: 1000 INJECTION, SOLUTION INTRAVENOUS; SUBCUTANEOUS at 10:36

## 2018-02-16 RX ADMIN — IPRATROPIUM BROMIDE AND ALBUTEROL SULFATE 1 AMPULE: .5; 3 SOLUTION RESPIRATORY (INHALATION) at 07:18

## 2018-02-16 RX ADMIN — LEVOFLOXACIN 500 MG: 5 INJECTION, SOLUTION INTRAVENOUS at 11:23

## 2018-02-16 RX ADMIN — HYDRALAZINE HYDROCHLORIDE 50 MG: 50 TABLET, FILM COATED ORAL at 14:46

## 2018-02-16 RX ADMIN — OXYCODONE HYDROCHLORIDE AND ACETAMINOPHEN 1 TABLET: 5; 325 TABLET ORAL at 19:43

## 2018-02-16 RX ADMIN — HEPARIN SODIUM 5000 UNITS: 5000 INJECTION, SOLUTION INTRAVENOUS; SUBCUTANEOUS at 06:25

## 2018-02-16 RX ADMIN — NAFCILLIN SODIUM 1 G: 1 INJECTION, POWDER, LYOPHILIZED, FOR SOLUTION INTRAMUSCULAR; INTRAVENOUS at 06:24

## 2018-02-16 RX ADMIN — OXYCODONE HYDROCHLORIDE AND ACETAMINOPHEN 1 TABLET: 5; 325 TABLET ORAL at 11:23

## 2018-02-16 RX ADMIN — METHYLPREDNISOLONE SODIUM SUCCINATE 40 MG: 40 INJECTION, POWDER, FOR SOLUTION INTRAMUSCULAR; INTRAVENOUS at 03:32

## 2018-02-16 RX ADMIN — HYDRALAZINE HYDROCHLORIDE 50 MG: 50 TABLET, FILM COATED ORAL at 21:42

## 2018-02-16 RX ADMIN — METHYLPREDNISOLONE SODIUM SUCCINATE 40 MG: 40 INJECTION, POWDER, FOR SOLUTION INTRAMUSCULAR; INTRAVENOUS at 14:46

## 2018-02-16 RX ADMIN — HEPARIN SODIUM 5000 UNITS: 5000 INJECTION, SOLUTION INTRAVENOUS; SUBCUTANEOUS at 14:46

## 2018-02-16 RX ADMIN — METHYLPREDNISOLONE SODIUM SUCCINATE 40 MG: 40 INJECTION, POWDER, FOR SOLUTION INTRAMUSCULAR; INTRAVENOUS at 23:35

## 2018-02-16 RX ADMIN — LINEZOLID 600 MG: 600 INJECTION, SOLUTION INTRAVENOUS at 23:35

## 2018-02-16 RX ADMIN — Medication 10 ML: at 21:43

## 2018-02-16 RX ADMIN — HEPARIN SODIUM 1200 UNITS: 1000 INJECTION, SOLUTION INTRAVENOUS; SUBCUTANEOUS at 10:35

## 2018-02-16 RX ADMIN — CARVEDILOL 6.25 MG: 6.25 TABLET, FILM COATED ORAL at 11:23

## 2018-02-16 RX ADMIN — IPRATROPIUM BROMIDE AND ALBUTEROL SULFATE 1 AMPULE: .5; 3 SOLUTION RESPIRATORY (INHALATION) at 16:54

## 2018-02-16 RX ADMIN — Medication 2 PUFF: at 21:43

## 2018-02-16 RX ADMIN — OXYCODONE HYDROCHLORIDE 5 MG: 5 TABLET ORAL at 06:24

## 2018-02-16 ASSESSMENT — PAIN SCALES - GENERAL
PAINLEVEL_OUTOF10: 3
PAINLEVEL_OUTOF10: 9
PAINLEVEL_OUTOF10: 7
PAINLEVEL_OUTOF10: 5
PAINLEVEL_OUTOF10: 6
PAINLEVEL_OUTOF10: 7
PAINLEVEL_OUTOF10: 9
PAINLEVEL_OUTOF10: 7
PAINLEVEL_OUTOF10: 9
PAINLEVEL_OUTOF10: 5
PAINLEVEL_OUTOF10: 7
PAINLEVEL_OUTOF10: 9
PAINLEVEL_OUTOF10: 7

## 2018-02-16 ASSESSMENT — ENCOUNTER SYMPTOMS
VOMITING: 0
WHEEZING: 1
SHORTNESS OF BREATH: 1
ALLERGIC/IMMUNOLOGIC NEGATIVE: 1
GASTROINTESTINAL NEGATIVE: 1
COUGH: 0
EYES NEGATIVE: 1
NAUSEA: 0
COUGH: 1
ABDOMINAL PAIN: 1
SHORTNESS OF BREATH: 0

## 2018-02-16 ASSESSMENT — PAIN DESCRIPTION - FREQUENCY: FREQUENCY: CONTINUOUS

## 2018-02-16 ASSESSMENT — PAIN DESCRIPTION - PAIN TYPE
TYPE: CHRONIC PAIN
TYPE: CHRONIC PAIN
TYPE: ACUTE PAIN

## 2018-02-16 ASSESSMENT — PAIN DESCRIPTION - LOCATION
LOCATION: LEG
LOCATION: LEG
LOCATION: KNEE
LOCATION: KNEE
LOCATION: GENERALIZED
LOCATION: KNEE

## 2018-02-16 ASSESSMENT — PAIN DESCRIPTION - DESCRIPTORS
DESCRIPTORS: ACHING;CRAMPING
DESCRIPTORS: ACHING;CRAMPING

## 2018-02-16 ASSESSMENT — PAIN DESCRIPTION - ORIENTATION
ORIENTATION: RIGHT

## 2018-02-16 NOTE — PROGRESS NOTES
Units Subcutaneous 3 times per day    isosorbide mononitrate  30 mg Oral Daily    carvedilol  6.25 mg Oral BID     heparin (porcine)  10,000 Units Intercatheter Once    famotidine (PEPCID) injection  10 mg Intravenous Daily    levofloxacin  500 mg Intravenous Q48H    methylPREDNISolone  40 mg Intravenous Q6H    insulin lispro  0-6 Units Subcutaneous TID     insulin lispro  0-3 Units Subcutaneous Nightly    docusate sodium  200 mg Oral Daily    iron sucrose  100 mg Intravenous Q24H    darbepoetin trang-polysorbate  25 mcg Subcutaneous Weekly - Thursday    sodium chloride flush  10 mL Intravenous 2 times per day    ipratropium-albuterol  1 ampule Inhalation Q4H WA    mometasone-formoterol  2 puff Inhalation BID    sodium chloride flush  10 mL Intravenous Q12H     fentanNYL, hydrALAZINE, heparin (porcine), heparin (porcine), oxyCODONE-acetaminophen **AND** oxyCODONE, sodium chloride flush, acetaminophen, magnesium hydroxide, bisacodyl, ondansetron, nicotine, albuterol, diazepam, glucose, dextrose, glucagon (rDNA), dextrose, ipratropium-albuterol, sodium chloride flush  IV Drips/Infusions   dextrose         Diet/Nutrition   DIET RENAL; Low Sodium (2 GM); Dental Soft; Daily Fluid Restriction: 1500 ml; Renal    Exam      Constitutional - Alert, arousable  General Appearance  well developed, well nourished  HEENT -normocephalic, atraumatic. PERRLA  Lungs - Chest expands equally, Bilateral wheezes with rhonchi. Cardiovascular - Heart sounds are normal.  normal rate and rhythm regular, no murmur, gallop or rub.   Abdomen - soft, nontender, nondistended, no masses or organomegaly  Neurologic - alert and oriented ×3  Skin - no bruising or bleeding  Extremities - no cyanosis, clubbing or edema    Lab Results   CBC     Lab Results   Component Value Date    WBC 6.4 02/16/2018    RBC 2.67 02/16/2018    HGB 8.5 02/16/2018    HCT 26.9 02/16/2018     02/16/2018    .8 02/16/2018    MCH 32.0 02/16/2018

## 2018-02-16 NOTE — PROGRESS NOTES
Nutrition Assessment    Type and Reason for Visit: Reassess    Nutrition Recommendations: Continue diet as ordered. Recommend Boost chocolate BID at lunch and dinner    Malnutrition Assessment:  · Malnutrition Status: Mild Malnutrition  · Context: Acute illness or injury  · Findings of the 6 clinical characteristics of malnutrition (Minimum of 2 out of 6 clinical characteristics is required to make the diagnosis of moderate or severe Protein Calorie Malnutrition based on AND/ASPEN Guidelines):  1. Energy Intake-Less than or equal to 50%, greater than or equal to 5 days    2. Weight Loss-No significant weight loss,    3. Fat Loss-Mild subcutaneous fat loss, Orbital  4. Muscle Loss-Mild muscle mass loss, Temples (temporalis muscle)  5. Fluid Accumulation-No significant fluid accumulation, Extremities    Nutrition Diagnosis:   · Problem: Inadequate oral intake  · Etiology: related to Acute injury/trauma     Signs and symptoms:  as evidenced by Diet history of poor intake, Intake 0-25%    Nutrition Assessment:  · Subjective Assessment: Patient reports he is eating good but tired of chicken, especially chicken breast, it is too dry and hard to chew. Says he would like other kinds of meats. Likes ONS and drinks all of it.    · Nutrition-Focused Physical Findings: Edema: Trace RUE, BLE, +1 non-pitting LUE  · Current Nutrition Therapies:  · Oral Diet Orders: Renal, Carb Control 4 Carbs/Meal, Dental Soft, Fluid Restriction   · Oral Diet intake: 26-50%  · Oral Nutrition Supplement (ONS) Orders:  (Boost chocolate)  · ONS intake: %  · Anthropometric Measures:  · Ht: 5' 8\" (172.7 cm)   · Current Body Wt: 123 lb 14.4 oz (56.2 kg)  · Admission Body Wt: 133 lb 13.1 oz (60.7 kg)  · Ideal Body Wt: 154 lb (69.9 kg), % Ideal Body 80%  · BMI Classification: BMI 18.5 - 24.9 Normal Weight  · Comparative Standards (Estimated Nutrition Needs):  · Estimated Daily Total Kcal: 1830 kcal  · Estimated Daily Protein (g): 79-92

## 2018-02-16 NOTE — CONSULTS
Division of Vascular Surgery        New Consult      Physician Requesting Consult:  Harman Mattson MD    Reason for Consult:   Occluded bypass graft on imaging    Chief Complaint:     My legs are weak    History of Present Illness:      Maureen Batista is a 75 y.o. gentleman who presented over a week ago for worsening shortness of breath to the ER. He was evaluated and found to have respiratory distress, pneumonia, acute on chronic heart failure and acute on chronic renal failure. He has been in ICU since getting respiratory treatments, diuretics and currently undergoing dialysis to get fluid off. He underwent lower extremity non-invasive testing due to pain and weakness in his right leg, which showed an occluded femoral to femoral bypass graft. Patient tells me that both his legs started bothering him about 4-5 weeks ago and he has become quite weak since then. He denies symptoms suggestive of rest pain, no numbness or tingling and he does not have any wounds to his feet. He did undergo multiple surgeries, over seven, 15 years ago due to a ruptured abdominal aortic aneurysm which was complicated with a bowel injury. He said they did bypasses from both his arms down to his groins.   He is not very clear about the details, but just remembers it being a big ordeal.      Medical History:     Past Medical History:   Diagnosis Date    Abdominal pain, chronic, generalized     Cervicodynia     Chronic back pain     Chronic kidney disease     secondary to renal vascular disease    Cigarette smoker     COPD (chronic obstructive pulmonary disease) ([address]     DDD (degenerative disc disease), lumbar     Depression     Family history of cancer     Hernia, ventral     Hyperlipidemia     Iron deficiency anemia     Lichen simplex chronicus     PUD (peptic ulcer disease)     Renal cyst     Type II or unspecified type diabetes mellitus without mention of complication, not stated as uncontrolled ([address] (HUMALOG) injection vial 0-6 Units  0-6 Units Subcutaneous TID WC Ronald Verdugo MD   Stopped at 02/15/18 0732    insulin lispro (HUMALOG) injection vial 0-3 Units  0-3 Units Subcutaneous Nightly Ronald Verdugo MD        oxyCODONE-acetaminophen (PERCOCET) 5-325 MG per tablet 1 tablet  1 tablet Oral Q6H PRN Ronald Verdugo MD   1 tablet at 02/15/18 1941    And    oxyCODONE (ROXICODONE) immediate release tablet 5 mg  5 mg Oral Q6H PRN Ronald Verdugo MD   5 mg at 02/15/18 1941    docusate sodium (COLACE) capsule 200 mg  200 mg Oral Daily Ronald Verdugo MD   Stopped at 02/12/18 3198    iron sucrose (VENOFER) 100 mg in sodium chloride 0.9 % 100 mL IVPB  100 mg Intravenous Q24H Sidney Pruitt MD   Stopped at 02/15/18 1845    darbepoetin trang-polysorbate (ARANESP) injection 25 mcg  25 mcg Subcutaneous Weekly - Thursday Sidney Pruitt MD   25 mcg at 02/15/18 1331    sodium chloride flush 0.9 % injection 10 mL  10 mL Intravenous 2 times per day Carter Tapia CNP   10 mL at 02/15/18 1942    sodium chloride flush 0.9 % injection 10 mL  10 mL Intravenous PRN Carter Tapia CNP   10 mL at 02/15/18 1010    acetaminophen (TYLENOL) tablet 650 mg  650 mg Oral Q4H PRN Carter Tapia CNP   650 mg at 02/13/18 0442    magnesium hydroxide (MILK OF MAGNESIA) 400 MG/5ML suspension 30 mL  30 mL Oral Daily PRN Carter Tapia CNP   30 mL at 02/09/18 1002    bisacodyl (DULCOLAX) suppository 10 mg  10 mg Rectal Daily PRN Carter Tapia CNP        ondansetron [facility]) injection 4 mg  4 mg Intravenous Q6H PRN Carter Tapia CNP        nicotine (NICODERM CQ) 21 MG/24HR 1 patch  1 patch Transdermal Daily PRN Carter Tapia CNP        albuterol (PROVENTIL) nebulizer solution 2.5 mg  2.5 mg Nebulization Q2H PRN Carter Tapia CNP   2.5 mg at 02/06/18 0416    ipratropium-albuterol (DUONEB) nebulizer solution 1 ampule  1 ampule Inhalation Q4H RYLIE Peck MD   1 ampule at 02/15/18 2047    mometasone-formoterol (Judi Brewer) He has a sickly appearance. He appears ill. Nasal cannula in place. Eyes: Conjunctivae and EOM are normal.   Neck: Carotid bruit is not present. Cardiovascular: Normal rate, regular rhythm and normal heart sounds. Pulses:       Radial pulses are 2+ on the right side, and 2+ on the left side. Femoral pulses are 0 on the right side, and 0 on the left side. Dorsalis pedis pulses are Detected w/ doppler on the right side, and Detected w/ doppler on the left side. Posterior tibial pulses are Detected w/ doppler on the right side, and Detected w/ doppler on the left side. Bilateral axillary artery exposure scars   Pulmonary/Chest: He is in respiratory distress (mild). Abdominal: Soft. He exhibits no pulsatile midline mass. There is no tenderness. A hernia is present. Hernia confirmed positive in the ventral area. Large ventral hernia   Feet:   Right Foot:   Skin Integrity: Negative for ulcer or skin breakdown. Left Foot:   Skin Integrity: Negative for ulcer or skin breakdown. Neurological: He is alert and oriented to person, place, and time. He has normal strength. No sensory deficit. Skin: Skin is warm and intact. Capillary refill takes less than 2 seconds. Psychiatric: His speech is normal and behavior is normal. He exhibits a depressed mood. Did express he has been thru a lot but would do anything to live longer to help take care of his wife. Imaging/Labs:     Arterial duplex shows monophasic waveforms throughout the vessels in his lower extremities, the fem-fem bypass graft is occluded. CT imaging without contrast show presence of a left axillary to fem-fem bypass graft, also evidence of infrarenal aortic ligation.     Assessment and Plan:     Occluded left Axillary to bifemoral bypass graft  · Does not appear to be acute occlusion, since he does not have any acutely ischemic findings on exam  · No signs of rest pain or tissue loss that would require urgent

## 2018-02-16 NOTE — PROGRESS NOTES
Nephrology Progress Note    Subjective/     Patient is being followed by nephrology for the management of acute kidney injury superimposed on chronic kidney disease stage IV. This is a 75 y.o. male active smoker of 2 PPD for many years, hyperlipidemia, COPD and chronic kidney disease stage 4 secondary to hypertension + type 2 DM (baseline creatinine of 2.6-2.8 mg/dl), who presented with decreased urine output and general weakness and was found to have a potassium of 6.0 and BUN/creatinne of 77/3.70 mg/dl. BNP was markedly elevated at 59,238. A bladder scan at bedside showed 1000 cc of urine and insertion of diggs catheter yielded 950 cc of brisk urine. 2-D echocardiogram shows ejection fraction 25% and chest CT scan showed multiple nodules some of which was spiculated reason suspicion for metastatic process. He was transferred back to the ICU due to increasing respiratory distress and is currently on BiPAP with FiO2 50%. Today:   seen and examined in ICU, During dialysis  On nasal cannula  Tolerating dialysis      Objective/     Vitals:    02/16/18 1500 02/16/18 1600 02/16/18 1654 02/16/18 1713   BP: (!) 134/47 (!) 140/52  (!) 129/46   Pulse: 70 72  73   Resp: 18 21 20    Temp:  98.5 °F (36.9 °C)     TempSrc:  Oral     SpO2: 97% 96% 99%    Weight:       Height:         24HR INTAKE/OUTPUT:      Intake/Output Summary (Last 24 hours) at 02/16/18 1716  Last data filed at 02/16/18 0500   Gross per 24 hour   Intake              656 ml   Output              360 ml   Net              296 ml     Patient Vitals for the past 96 hrs (Last 3 readings):   Weight   02/16/18 1108 123 lb 14.4 oz (56.2 kg)   02/16/18 0728 128 lb 4.9 oz (58.2 kg)   02/14/18 1139 127 lb 13.9 oz (58 kg)       Constitutional:  Alert, awake,but in apparent distress  Cardiovascular:  S1, S2 without m/r/g  Respiratory:  Diminished. Abdomen: +bs, soft, nt  Ext:  LE edema  Right foot drop.     Data/  Recent Labs      02/14/18   0436   02/15/18

## 2018-02-16 NOTE — PROGRESS NOTES
[address] [address]    Progress Note    2/16/2018    9:12 AM    Name:   Wili Vega  MRN:     [id number]     Shilalyside:      [de-identified]   Room:   2007/2007-01   Day:  11  Admit Date:  2/5/2018  7:29 PM    PCP:   Paulie Odonnell MD  Code Status:  DNR-CCA    Subjective:     C/C:   Chief Complaint   Patient presents with    Shortness of Breath     Interval History Status: improved. Patient resting in bed comfortably on Nasal Canula. Tolerated BiPAP overnight. Was on Hi-Flow yesterday, but is tapering down in O2 requirement. Plan for HD today with goal of 2L UF off. Plan for Tunneled catheter placement on Monday by Gen Surg. Vascular seen yesterday due to Occluded Fem-bypass. No intervention at this time. Brief History:     The patient is a 75 y.o. Non-/non  male wit hPMHx of COPD on 3 LPM, Systolic CHF, and CKD baseline Cr of 2.90 who presents with Shortness of Breath. Patient was at PCP on 2/1 for similar complaint, and was given Levaquin and medrol dose pack PO. At the time, he had a productive cough, and chest pain with coughing. He had no improvement in his Sx, and came to ED for revaluation. In the ED, his O2 sat was at 85% on 3 LPM. He was given 4 LPM by Venturi mask. Pulmonology was consulted. CXR showed small B/L Pleural effusions and R sided hilar density. His Cr was also elevated at 3.70, so nephrology was consulted. Hgb was 7.0 in ED and he was given 1 U PRBC. Pt denied SANJAY, but reports possible bloody stools. He was given IV solumedrol bolus, and admitted to ICU intermediate   for the management of  Acute on Chronic Respiratory Failure     Review of Systems:     Review of Systems   Constitutional: Negative for chills and fever. Respiratory: Negative for cough and shortness of breath. Cardiovascular: Negative for chest pain and palpitations. Gastrointestinal: Positive for abdominal pain.  Negative for nausea without mention of complication, not stated as uncontrolled ([address] and Vitamin D deficiency. Social History:   reports that he has been smoking Cigarettes. He has been smoking about 2.00 packs per day. He does not have any smokeless tobacco history on file. He reports that he does not drink alcohol or use drugs. Family History: No family history on file. Vitals:  BP (!) 131/51   Pulse 70   Temp 97.9 °F (36.6 °C) (Oral)   Resp 18   Ht 5' 8\" (1.727 m)   Wt 128 lb 4.9 oz (58.2 kg)   SpO2 95%   BMI 19.51 kg/m²   Temp (24hrs), Av.2 °F (36.8 °C), Min:97.8 °F (36.6 °C), Max:98.9 °F (37.2 °C)    Recent Labs      02/14/18   2007  02/15/18   1132  02/15/18   1648  02/15/18   2305   POCGLU  133*  144*  143*  113*       I/O (24Hr): Intake/Output Summary (Last 24 hours) at 18 0912  Last data filed at 18 0500   Gross per 24 hour   Intake              956 ml   Output              360 ml   Net              596 ml       Labs:    @CBC@  basic metabolic panel    Lab Results   Component Value Date/Time    SPECIAL NOT REPORTED 02/15/2018 06:04 PM     Lab Results   Component Value Date/Time    CULTURE Pending 02/15/2018 06:04 PM       [facility]    Radiology:    Ct Abdomen Pelvis Wo Contrast Additional Contrast? Radiologist Recommendation    Result Date: 2018  EXAMINATION: CT OF THE ABDOMEN AND PELVIS WITHOUT CONTRAST 2018 11:03 am TECHNIQUE: CT of the abdomen and pelvis was performed without the administration of intravenous contrast. Multiplanar reformatted images are provided for review. Dose modulation, iterative reconstruction, and/or weight based adjustment of the mA/kV was utilized to reduce the radiation dose to as low as reasonably achievable. COMPARISON: CT abdomen/ pelvis from 2010. HISTORY: ORDERING SYSTEM PROVIDED HISTORY: R/O Retroperitoneal bleed.  HbG continues to drop s/p 2 U PRBCs TECHNOLOGIST PROVIDED HISTORY: Additional Contrast?->Radiologist Recommendation Ordering Physician Provided Reason for Exam: DR LOOKING FOR A BLED. BLOOD COUNTS ARE GOING DOWN. PT IS ALLERGIC TO IV CONTRAST. GFR IS TOO LOW TO INJECT. FINDINGS: Lung bases: There is progression of centrilobular emphysematous changes in the lung bases. There are peripheral nodular opacities, may reflect atelectasis versus true lung nodules. No pleural or pericardial effusion. There is moderate multichamber cardiac enlargement. Organs: The liver has normal size and contours. No focal intrahepatic mass lesion identified. No extrahepatic biliary ductal dilatation. The gallbladder is present. The main portal vein is prominent in appearance. The spleen, pancreas and adrenal glands have a normal noncontrast CT appearance. The kidneys are symmetric in size and noncontrast appearance. There are cortical hypodensities in the bilateral kidneys, which are incompletely evaluated on the basis of this noncontrast study, but all have attenuation characteristics consistent with simple fluid and statistically most likely relate to benign renal cysts. Nonemergent renal ultrasound could be considered for further evaluation, as clinically indicated. No suspicious renal lesions identified. No obstructive uropathy. GI/bowel:  Similar appearance of ligamentous laxity versus large anterior abdominal wall defect containing small and large bowel loops. There is a large stool burden in the colon. No dilated loops of bowel, or findings to suggest obstruction. No mural thickening or adjacent inflammatory changes identified. An appendix is not confidently identified but there are no inflammatory changes identified in the right lower quadrant of the abdomen. . Peritoneum/retroperitoneum:  No lymphadenopathy, free fluid or free air identified in the abdomen or pelvis.  Stable findings associated with patient's axillary bifemoral graft with the visualized component of the graft descending along the left chest wall and left flank and a communicating with the left femoral artery with bridge connecting to the right femoral artery. There is focal occlusion of the abdominal aorta inferior to the take-off of the bilateral renal arteries. No retroperitoneal or intramuscular hematoma identified. Pelvis: Manzanares catheter in the urinary bladder. Normal noncontrast CT appearance of the prostate. . The urinary bladder is unremarkable. Bones/soft tissues: Similar appearance of increased sclerosis of the left femoral head may reflect sequelae of prior AVN. No suspicious osseous lesions are identified. 1. No retroperitoneal or intramuscular hematoma. 2. There is new nonspecific prominence of the main portal vein. Could consider Doppler integration for further evaluation, as indicated. 3. There are centrilobular emphysematous changes in the lung bases with peripherally oriented nodular opacities, may reflect atelectatic changes, but true lung nodules are not excluded. Nonemergent chest CT is recommended for further evaluation. 4. Other stable findings, as above. Xr Chest Standard (2 Vw)    Result Date: 2/5/2018  EXAMINATION: TWO VIEWS OF THE CHEST 2/5/2018 8:56 pm COMPARISON: November 24, 2015 HISTORY: ORDERING SYSTEM PROVIDED HISTORY: shortness of breath TECHNOLOGIST PROVIDED HISTORY: Reason for exam:->shortness of breath Ordering Physician Provided Reason for Exam: shortness of breath Acuity: Acute Type of Exam: Initial FINDINGS: Right hilar fullness has increased. Small pleural effusions are suspected. Chronic interstitial lung changes are again seen. Cardiomegaly. No pulmonary edema. Diffuse osteopenia with thoracic spine degenerative changes. Small pleural effusions. Increased right hilar density may reflect underlying adenopathy. Follow-up with chest CT may be helpful. Xr Femur Right (min 2 Views)    Result Date: 2/12/2018  EXAMINATION: 2 VIEWS OF THE RIGHT FEMUR 2/12/2018 9:41 am COMPARISON: None.  HISTORY: ORDERING SYSTEM PROVIDED Chest Portable    Result Date: 2/14/2018  EXAMINATION: SINGLE VIEW OF THE CHEST, 2/14/2018 6:33 am COMPARISON: 02/13/2018 HISTORY: ORDERING SYSTEM PROVIDED HISTORY: dyspnea TECHNOLOGIST PROVIDED HISTORY: Reason for exam:->dyspnea Ordering Physician Provided Reason for Exam: DYSPNEA Acuity: Unknown Type of Exam:  Unknown FINDINGS: Right IJ central venous catheter is stable in position. Cardiomediastinal silhouette and pulmonary vasculature are stable. Scattered pleural and parenchymal scarring in the lungs as well as pulmonary hyperinflation is unchanged. There is no new airspace consolidation. No sizable pneumothorax or pleural effusion. No free air beneath the diaphragm. No evidence of acute osseous abnormality. Stable appearance of the chest.  No acute findings. Xr Chest Portable    Result Date: 2/13/2018  EXAMINATION: SINGLE VIEW OF THE CHEST 2/13/2018 6:30 am COMPARISON: 02/12/2018 HISTORY: ORDERING SYSTEM PROVIDED HISTORY: worsening dyspnea; on Bipap TECHNOLOGIST PROVIDED HISTORY: Reason for exam:->worsening dyspnea; on Bipap Acuity: Unknown Type of Exam: Unknown Additional signs and symptoms: Increased dyspnea; pt on bipap. FINDINGS: Cardiac silhouette is borderline prominent. There are atherosclerotic calcifications of the aortic arch. Right-sided jugular venous catheter terminates overlying the expected location of the SVC, in grossly unchanged positioning. Lungs remain hyperinflated and there is generalized interstitial prominence. Trace bilateral pleural effusions versus pleuroparenchymal scarring noted. No obvious focal airspace consolidation or pneumothorax. Osseous structures and soft tissues are grossly stable. Right-sided jugular venous catheter remains in unchanged position. Chronic findings of COPD. No obvious focal airspace consolidation or pneumothorax.      Xr Chest Portable    Result Date: 2/12/2018  EXAMINATION: SINGLE VIEW OF THE CHEST 2/12/2018 9:41 am COMPARISON: Emphysematous bulla in the right lung base. Bilateral prominent yinka consistent with enlargement of the central pulmonary arteries. Right suprahilar parenchymal consolidation extending into the right upper lobe, unchanged. Blunting of the costophrenic angle suggesting small effusions. No pneumothorax. No significant interval radiographic change. Vl Dup Lower Extremity Arteries Bilateral    Result Date: 2/15/2018    [facility]  Vascular Lower Extremities Arterial Duplex Procedure   Patient Name  HUI CARRION [facility]  Date of Study           02/15/2018   Date of Birth 1950     Gender                  Male   Age           75 year(s)     Race                    Other   Room Number   2007           Height:                 67.72 inch, 172 cm   Corporate ID  [id number]     Weight:                 128 pounds, 58 kg  #   Patient Acct  [de-identified]  BSA:        1.69 m^2    BMI:      19.61  #                                                              kg/m^2   MR #          [id number]         Sonographer             Casper Amador   Accession #   [id number]      Interpreting Physician  Michael Naranjo   Referring                    Referring Physician     Mayra Cano  Nurse  Practitioner  Procedure Type of Study:   Extremities Arteries: Lower Extremities Arterial Duplex, Arterial Scan  Lower Bilateral.  Indications for Study:Absent pulses. Patient Status: In Patient. Technical Quality:Limited visualization. Limitation reason:Patient movement. Comments:Incidental finding: Patient appears to have a femoral to femoral bypass. Bypass appears to be totally occluded when imaged with B-Mode. Bypass has no detectable velocities. Conclusions   Summary   Simultaneous real time imaging utilizing B-Mode, color doppler and  spectral waveform analysis was performed on bilateral lower extremities  for arterial examination: Study demonstrates:   ABNORMAL STUDY   Occluded femoral to femoral bypass graft.    Severely 2007        Height:                 67.72 inch, 172 cm   Corporate ID # [id number]  Weight:                 128 pounds, 58 kg   Patient Acct # [de-identified]   BSA:        1.69 m^2    BMI:      19.61 kg/m^2   MR #           [id number]      Sonographer             Andreas Mooney, HEATH   Accession #    [id number]   Interpreting Physician  Mark Barrow   Referring                  Referring Physician     Arpan Laird  Nurse  Practitioner  Procedure Type of Study:   Veins: Lower Extremities DVT Study, Venous Scan Lower Bilateral.  Indications for Study:R/O DVT. Patient Status: In Patient. Technical Quality:Limited visualization. Comments:Exam performed by [address] [place] [address] 13. Conclusions   Summary   No evidence of superficial or deep venous thrombosis in both lower  extremities. Technically limited visualization. Signature   ----------------------------------------------------------------  Electronically signed by Milton Pedersen(Interpreting  physician) on 02/13/2018 08:51 PM  ----------------------------------------------------------------   ----------------------------------------------------------------  Electronically signed by Andreas Mooney RVT(Sonographer) on  02/13/2018 05:44 PM  ----------------------------------------------------------------  Findings:   Right Impression:                    Left Impression:  The common femoral, femoral,         The common femoral, femoral,  popliteal and tibial veins           popliteal and tibial veins  demonstrate normal compressibility   demonstrate normal compressibility  and augmentation. and augmentation. Limited visualization of the         Limited visualization of the  posterior tibial and peroneal veins. posterior tibial and peroneal veins. Normal compressibility of the great  Normal compressibility of the great  saphenous vein. saphenous vein.    Normal compressibility of the small  Normal compressibility of the small  saphenous vein. saphenous vein. Velocities are measured in cm/s ; Diameters are measured in mm Right Lower Extremities DVT Study Measurements Right 2D Measurements +------------------------------------+----------+---------------+----------+ ! Location                            ! Visualized! Compressibility! Thrombosis! +------------------------------------+----------+---------------+----------+ ! Common Femoral                      !Yes       ! Yes            ! None      ! +------------------------------------+----------+---------------+----------+ ! Prox Femoral                        !Yes       ! Yes            ! None      ! +------------------------------------+----------+---------------+----------+ ! Mid Femoral                         !Yes       ! Yes            ! None      ! +------------------------------------+----------+---------------+----------+ ! Dist Femoral                        !Yes       ! Yes            ! None      ! +------------------------------------+----------+---------------+----------+ ! Deep Femoral                        !Yes       ! Yes            ! None      ! +------------------------------------+----------+---------------+----------+ ! Popliteal                           !Yes       ! Yes            ! None      ! +------------------------------------+----------+---------------+----------+ ! Sapheno Femoral Junction            ! Yes       ! Yes            ! None      ! +------------------------------------+----------+---------------+----------+ ! PTV                                 ! Partial   !Yes            ! None      ! +------------------------------------+----------+---------------+----------+ ! Peroneal                            !Partial   !Yes            ! None      ! +------------------------------------+----------+---------------+----------+ ! Gastroc                             ! Yes       ! Yes            ! None      ! !Yes       !Yes            ! None      ! +------------------------------------+----------+---------------+----------+ ! PTV                                 ! Partial   !Yes            ! None      ! +------------------------------------+----------+---------------+----------+ ! Peroneal                            !Partial   !Yes            ! None      ! +------------------------------------+----------+---------------+----------+ ! Gastroc                             ! Yes       ! Yes            ! None      ! +------------------------------------+----------+---------------+----------+ ! GSV Thigh                           ! Yes       ! Yes            ! None      ! +------------------------------------+----------+---------------+----------+ ! GSV Knee                            ! Yes       ! Yes            ! None      ! +------------------------------------+----------+---------------+----------+ ! GSV Ankle                           ! Yes       ! Yes            ! None      ! +------------------------------------+----------+---------------+----------+ ! SSV                                 ! Yes       ! Yes            ! None      ! +------------------------------------+----------+---------------+----------+    Vl Arterial Pvr Lower    Result Date: 2/15/2018    [facility]  Vascular Lower Arterial Plethysmography Procedure   Patient Name   [facility] Date of Study           02/15/2018                 C   Date of Birth  1950  Gender                  Male   Age            75 year(s)  Race                    Other   Room Number    2007        Height:                 67.72 inch, 172 cm   Corporate ID # [id number]  Weight:                 128 pounds, 58 kg   Patient Acct # [de-identified]   BSA:        1.69 m^2    BMI:      19.61 kg/m^2   MR #           [id number]      Sonographer             Casper Amador   Accession #    [id number]   Interpreting Physician  Nancy Carey   Referring                  Referring Physician     JIMMY MILLER reduction       NICO demonstrates critical limb  between the calf and ankle cuffs     ischemia.  suggesting tibial artery disease. TBI demonstrates critical limb  NICO demonstrates critical limb       ischemia. ischemia. TBI demonstrates critical limb  ischemia. Velocities are measured in cm/s ; Diameters are measured in mm Pressures +--------------------------------------++--------+-----+----+--------+-----+ ! ! !Right   ! ! Left!        !     ! +--------------------------------------++--------+-----+----+--------+-----+ ! Location                              ! !Pressure! Ratio! !Pressure! Ratio! +--------------------------------------++--------+-----+----+--------+-----+ ! Thigh                                 !!48      !0.35 ! !50      !0.36 ! +--------------------------------------++--------+-----+----+--------+-----+ ! Calf                                  !!136     !0.99 !    !49      !0.36 ! +--------------------------------------++--------+-----+----+--------+-----+ ! Ankle PT                              !!30      !0.22 !    !33      !0.24 ! +--------------------------------------++--------+-----+----+--------+-----+ ! Ankle DP                              !!39      !0.28 !    !33      !0.24 ! +--------------------------------------++--------+-----+----+--------+-----+ ! Great Toe                             !!20      !0.14 !    !21      !0.15 ! +--------------------------------------++--------+-----+----+--------+-----+   - Brachial Pressure:Left:138.   - NICO:Right: 0.28. Left: 0.24. Right Plethysmographic Results +---------------+---------------+------------+------------+----------------+ ! Right          !               !            !            !                ! +---------------+---------------+------------+------------+----------------+ ! Location       ! Pressure       ! Ratio       ! Notch       ! Amplitude       ! +---------------+---------------+------------+------------+----------------+ ! Calf           !136            !0.99        !            !                ! +---------------+---------------+------------+------------+----------------+ ! Ankle PT       !30             !0.22        !            !                ! +---------------+---------------+------------+------------+----------------+ ! Ankle DP       !39             !0.28        !            !                ! +---------------+---------------+------------+------------+----------------+   - Right NICO:0.28. Left Plethysmographic Results +---------------+---------------+------------+------------+----------------+ ! Left           !               !            !            !                ! +---------------+---------------+------------+------------+----------------+ ! Location       ! Pressure       ! Ratio       ! Notch       ! Amplitude       ! +---------------+---------------+------------+------------+----------------+ ! Calf           !49             !0.36        !            !                ! +---------------+---------------+------------+------------+----------------+ ! Ankle PT       !33             !0.24        !            !                ! +---------------+---------------+------------+------------+----------------+ ! Ankle DP       !33             !0.24        !            !                ! +---------------+---------------+------------+------------+----------------+   - Left Brachial Pressure:138. - Left NICO:0.24. Plethysmographic Digit Evaluation +---------++--------+-----+---------------++--------+-----+----------------+ ! ! !Right   ! ! Left           !!        !     !                ! +---------++--------+-----+---------------++--------+-----+----------------+ ! Location ! !Pressure! Ratio! PPG Wave Form  ! !Pressure! Ratio! PPG Wave Form   ! +---------++--------+-----+---------------++--------+-----+----------------+ ! Great Toe!!20      !0.14 !

## 2018-02-17 LAB
ANION GAP SERPL CALCULATED.3IONS-SCNC: 9 MMOL/L (ref 9–17)
BUN BLDV-MCNC: 37 MG/DL (ref 8–23)
BUN/CREAT BLD: ABNORMAL (ref 9–20)
CALCIUM SERPL-MCNC: 8.1 MG/DL (ref 8.6–10.4)
CHLORIDE BLD-SCNC: 100 MMOL/L (ref 98–107)
CO2: 29 MMOL/L (ref 20–31)
CREAT SERPL-MCNC: 2.19 MG/DL (ref 0.7–1.2)
GFR AFRICAN AMERICAN: 36 ML/MIN
GFR NON-AFRICAN AMERICAN: 30 ML/MIN
GFR SERPL CREATININE-BSD FRML MDRD: ABNORMAL ML/MIN/{1.73_M2}
GFR SERPL CREATININE-BSD FRML MDRD: ABNORMAL ML/MIN/{1.73_M2}
GLUCOSE BLD-MCNC: 110 MG/DL (ref 75–110)
GLUCOSE BLD-MCNC: 147 MG/DL (ref 75–110)
GLUCOSE BLD-MCNC: 217 MG/DL (ref 75–110)
GLUCOSE BLD-MCNC: 94 MG/DL (ref 70–99)
HCT VFR BLD CALC: 24.4 % (ref 41–53)
HEMOGLOBIN: 7.7 G/DL (ref 13.5–17.5)
MCH RBC QN AUTO: 31.6 PG (ref 26–34)
MCHC RBC AUTO-ENTMCNC: 31.7 G/DL (ref 31–37)
MCV RBC AUTO: 99.6 FL (ref 80–100)
NRBC AUTOMATED: ABNORMAL PER 100 WBC
PDW BLD-RTO: 18.7 % (ref 11.5–14.9)
PLATELET # BLD: 113 K/UL (ref 150–450)
PMV BLD AUTO: 7.4 FL (ref 6–12)
POTASSIUM SERPL-SCNC: 3.9 MMOL/L (ref 3.7–5.3)
RBC # BLD: 2.45 M/UL (ref 4.5–5.9)
SODIUM BLD-SCNC: 138 MMOL/L (ref 135–144)
WBC # BLD: 7.7 K/UL (ref 3.5–11)

## 2018-02-17 PROCEDURE — 6370000000 HC RX 637 (ALT 250 FOR IP): Performed by: NURSE PRACTITIONER

## 2018-02-17 PROCEDURE — 6360000002 HC RX W HCPCS: Performed by: FAMILY MEDICINE

## 2018-02-17 PROCEDURE — 2580000003 HC RX 258: Performed by: NURSE PRACTITIONER

## 2018-02-17 PROCEDURE — 6370000000 HC RX 637 (ALT 250 FOR IP): Performed by: STUDENT IN AN ORGANIZED HEALTH CARE EDUCATION/TRAINING PROGRAM

## 2018-02-17 PROCEDURE — 2060000000 HC ICU INTERMEDIATE R&B

## 2018-02-17 PROCEDURE — 94762 N-INVAS EAR/PLS OXIMTRY CONT: CPT

## 2018-02-17 PROCEDURE — 36415 COLL VENOUS BLD VENIPUNCTURE: CPT

## 2018-02-17 PROCEDURE — 94660 CPAP INITIATION&MGMT: CPT

## 2018-02-17 PROCEDURE — 6360000002 HC RX W HCPCS: Performed by: STUDENT IN AN ORGANIZED HEALTH CARE EDUCATION/TRAINING PROGRAM

## 2018-02-17 PROCEDURE — 6370000000 HC RX 637 (ALT 250 FOR IP): Performed by: FAMILY MEDICINE

## 2018-02-17 PROCEDURE — 99232 SBSQ HOSP IP/OBS MODERATE 35: CPT | Performed by: INTERNAL MEDICINE

## 2018-02-17 PROCEDURE — 6360000002 HC RX W HCPCS: Performed by: INTERNAL MEDICINE

## 2018-02-17 PROCEDURE — 2500000003 HC RX 250 WO HCPCS: Performed by: STUDENT IN AN ORGANIZED HEALTH CARE EDUCATION/TRAINING PROGRAM

## 2018-02-17 PROCEDURE — 94640 AIRWAY INHALATION TREATMENT: CPT

## 2018-02-17 PROCEDURE — S0028 INJECTION, FAMOTIDINE, 20 MG: HCPCS | Performed by: STUDENT IN AN ORGANIZED HEALTH CARE EDUCATION/TRAINING PROGRAM

## 2018-02-17 PROCEDURE — 2580000003 HC RX 258: Performed by: EMERGENCY MEDICINE

## 2018-02-17 PROCEDURE — 6370000000 HC RX 637 (ALT 250 FOR IP): Performed by: INTERNAL MEDICINE

## 2018-02-17 PROCEDURE — 82947 ASSAY GLUCOSE BLOOD QUANT: CPT

## 2018-02-17 PROCEDURE — 80048 BASIC METABOLIC PNL TOTAL CA: CPT

## 2018-02-17 PROCEDURE — 85027 COMPLETE CBC AUTOMATED: CPT

## 2018-02-17 RX ORDER — METHYLPREDNISOLONE SODIUM SUCCINATE 40 MG/ML
30 INJECTION, POWDER, LYOPHILIZED, FOR SOLUTION INTRAMUSCULAR; INTRAVENOUS EVERY 8 HOURS
Status: DISCONTINUED | OUTPATIENT
Start: 2018-02-17 | End: 2018-02-18

## 2018-02-17 RX ORDER — LIDOCAINE 50 MG/G
1 PATCH TOPICAL DAILY
Status: DISCONTINUED | OUTPATIENT
Start: 2018-02-17 | End: 2018-02-22

## 2018-02-17 RX ADMIN — FENTANYL CITRATE 25 MCG: 50 INJECTION INTRAMUSCULAR; INTRAVENOUS at 07:55

## 2018-02-17 RX ADMIN — Medication 2 PUFF: at 07:49

## 2018-02-17 RX ADMIN — Medication 10 ML: at 07:51

## 2018-02-17 RX ADMIN — HYDRALAZINE HYDROCHLORIDE 50 MG: 50 TABLET, FILM COATED ORAL at 13:46

## 2018-02-17 RX ADMIN — METHYLPREDNISOLONE SODIUM SUCCINATE 30 MG: 40 INJECTION, POWDER, FOR SOLUTION INTRAMUSCULAR; INTRAVENOUS at 22:43

## 2018-02-17 RX ADMIN — OXYCODONE HYDROCHLORIDE AND ACETAMINOPHEN 1 TABLET: 5; 325 TABLET ORAL at 03:17

## 2018-02-17 RX ADMIN — INSULIN LISPRO 1 UNITS: 100 INJECTION, SOLUTION INTRAVENOUS; SUBCUTANEOUS at 20:34

## 2018-02-17 RX ADMIN — HYDRALAZINE HYDROCHLORIDE 50 MG: 50 TABLET, FILM COATED ORAL at 22:43

## 2018-02-17 RX ADMIN — HEPARIN SODIUM 5000 UNITS: 5000 INJECTION, SOLUTION INTRAVENOUS; SUBCUTANEOUS at 05:48

## 2018-02-17 RX ADMIN — IPRATROPIUM BROMIDE AND ALBUTEROL SULFATE 1 AMPULE: .5; 3 SOLUTION RESPIRATORY (INHALATION) at 19:26

## 2018-02-17 RX ADMIN — Medication 10 ML: at 20:34

## 2018-02-17 RX ADMIN — HEPARIN SODIUM 5000 UNITS: 5000 INJECTION, SOLUTION INTRAVENOUS; SUBCUTANEOUS at 13:47

## 2018-02-17 RX ADMIN — FAMOTIDINE 10 MG: 10 INJECTION, SOLUTION INTRAVENOUS at 07:48

## 2018-02-17 RX ADMIN — LINEZOLID 600 MG: 600 INJECTION, SOLUTION INTRAVENOUS at 11:38

## 2018-02-17 RX ADMIN — Medication 10 ML: at 07:52

## 2018-02-17 RX ADMIN — FENTANYL CITRATE 25 MCG: 50 INJECTION INTRAMUSCULAR; INTRAVENOUS at 22:38

## 2018-02-17 RX ADMIN — HEPARIN SODIUM 5000 UNITS: 5000 INJECTION, SOLUTION INTRAVENOUS; SUBCUTANEOUS at 22:44

## 2018-02-17 RX ADMIN — INSULIN LISPRO 2 UNITS: 100 INJECTION, SOLUTION INTRAVENOUS; SUBCUTANEOUS at 11:38

## 2018-02-17 RX ADMIN — Medication 2 PUFF: at 20:33

## 2018-02-17 RX ADMIN — IPRATROPIUM BROMIDE AND ALBUTEROL SULFATE 1 AMPULE: .5; 3 SOLUTION RESPIRATORY (INHALATION) at 11:02

## 2018-02-17 RX ADMIN — Medication 10 ML: at 22:49

## 2018-02-17 RX ADMIN — HYDRALAZINE HYDROCHLORIDE 50 MG: 50 TABLET, FILM COATED ORAL at 06:06

## 2018-02-17 RX ADMIN — OXYCODONE HYDROCHLORIDE AND ACETAMINOPHEN 1 TABLET: 5; 325 TABLET ORAL at 20:08

## 2018-02-17 RX ADMIN — METHYLPREDNISOLONE SODIUM SUCCINATE 40 MG: 40 INJECTION, POWDER, FOR SOLUTION INTRAMUSCULAR; INTRAVENOUS at 05:47

## 2018-02-17 RX ADMIN — IPRATROPIUM BROMIDE AND ALBUTEROL SULFATE 1 AMPULE: .5; 3 SOLUTION RESPIRATORY (INHALATION) at 07:35

## 2018-02-17 RX ADMIN — ISOSORBIDE MONONITRATE 30 MG: 30 TABLET, EXTENDED RELEASE ORAL at 07:50

## 2018-02-17 RX ADMIN — DOCUSATE SODIUM 200 MG: 100 CAPSULE, LIQUID FILLED ORAL at 07:54

## 2018-02-17 RX ADMIN — CARVEDILOL 6.25 MG: 6.25 TABLET, FILM COATED ORAL at 07:50

## 2018-02-17 RX ADMIN — OXYCODONE HYDROCHLORIDE 5 MG: 5 TABLET ORAL at 05:48

## 2018-02-17 RX ADMIN — OXYCODONE HYDROCHLORIDE AND ACETAMINOPHEN 1 TABLET: 5; 325 TABLET ORAL at 10:40

## 2018-02-17 RX ADMIN — OXYCODONE HYDROCHLORIDE 5 MG: 5 TABLET ORAL at 13:46

## 2018-02-17 RX ADMIN — LINEZOLID 600 MG: 600 INJECTION, SOLUTION INTRAVENOUS at 22:43

## 2018-02-17 RX ADMIN — CARVEDILOL 6.25 MG: 6.25 TABLET, FILM COATED ORAL at 17:08

## 2018-02-17 RX ADMIN — IPRATROPIUM BROMIDE AND ALBUTEROL SULFATE 1 AMPULE: .5; 3 SOLUTION RESPIRATORY (INHALATION) at 15:03

## 2018-02-17 RX ADMIN — METHYLPREDNISOLONE SODIUM SUCCINATE 30 MG: 40 INJECTION, POWDER, FOR SOLUTION INTRAMUSCULAR; INTRAVENOUS at 13:47

## 2018-02-17 ASSESSMENT — PAIN SCALES - GENERAL
PAINLEVEL_OUTOF10: 7
PAINLEVEL_OUTOF10: 6
PAINLEVEL_OUTOF10: 3
PAINLEVEL_OUTOF10: 7
PAINLEVEL_OUTOF10: 3
PAINLEVEL_OUTOF10: 3
PAINLEVEL_OUTOF10: 8
PAINLEVEL_OUTOF10: 7
PAINLEVEL_OUTOF10: 7
PAINLEVEL_OUTOF10: 3
PAINLEVEL_OUTOF10: 6
PAINLEVEL_OUTOF10: 3

## 2018-02-17 ASSESSMENT — ENCOUNTER SYMPTOMS
SHORTNESS OF BREATH: 0
COUGH: 0
HEARTBURN: 0
ABDOMINAL PAIN: 1
BLURRED VISION: 0
VOMITING: 0
WHEEZING: 0
SPUTUM PRODUCTION: 0
DOUBLE VISION: 0
BACK PAIN: 0

## 2018-02-17 ASSESSMENT — PAIN DESCRIPTION - LOCATION
LOCATION: LEG
LOCATION: BUTTOCKS;LEG

## 2018-02-17 ASSESSMENT — PAIN DESCRIPTION - PAIN TYPE
TYPE: CHRONIC PAIN
TYPE: CHRONIC PAIN

## 2018-02-17 ASSESSMENT — PAIN DESCRIPTION - ORIENTATION: ORIENTATION: RIGHT

## 2018-02-17 NOTE — PROGRESS NOTES
Breath Sounds: diminRR[de-identified] 16  Pulse Sat: 100% on bipap  Home Meds: prn txs    · Bronchodilator assessment at level:3   · []    Home Level  BRONCHODILATOR ASSESSMENT SCORE  Score 0 1 2 3 4 5   Breath Sounds   []  Patient Baseline []  No Wheeze good aeration []  Faint, scattered wheezing, good aeration [x]  Expiratory Wheezing and or moderately diminished []  Insp/Exp wheeze and/or very diminished []  Insp/Exp and/ or marked distress   Respiratory Rate   []  Patient Baseline []  Less than 20 [x]  Less than 20 []  20-25 []  Greater than 25 []  Greater than 25   Peak flow % of Pred or PB []  NA   []  Greater than 90%  []  81-90% []  71-80% [x]  Less than or equal to 70%  or unable to perform []  Unable due to Respiratory Distress   Dyspnea re []  Patient Baseline []  No SOB []  No SOB [x]  SOB on exertion []  SOB min activity []  At rest/acute   e FEV% Predicted       []  NA []  Above 69%  []  Unable []  Above 60-69%  []  Unable []  Above 50-59%  [x]  Unable []  Above 35-49%  []  Unable []  Less than 35%  []  Unable

## 2018-02-17 NOTE — PROGRESS NOTES
Pulmonary Progress Note  [facility]      Patient - Golden Montejo,  Age - 75 y.o.    - 1950      Room Number -    MRN -  [id number]   Acct # - [de-identified]  Date of Admission -  2018  7:29 PM        Consulting Julee Hill MD  Primary Care Physician - Jerie Ormond, MD     SUBJECTIVE   Remains in good spirits  No distress    OBJECTIVE   VITALS    height is 5' 8\" (1.727 m) and weight is 123 lb 14.4 oz (56.2 kg). His axillary temperature is 97.9 °F (36.6 °C). His blood pressure is 119/53 (abnormal) and his pulse is 72. His respiration is 20 and oxygen saturation is 100%. Body mass index is 18.84 kg/m². Temperature Range: Temp: 97.9 °F (36.6 °C) Temp  Av.4 °F (36.9 °C)  Min: 97.9 °F (36.6 °C)  Max: 99 °F (37.2 °C)  BP Range:  Systolic (29ORV), LCQ:878 , Min:91 , MQT:990     Diastolic (42WVM), PFX:55, Min:39, Max:72    Pulse Range: Pulse  Av.7  Min: 70  Max: 84  Respiration Range: Resp  Av.2  Min: 17  Max: 26  Current Pulse Ox[de-identified]  SpO2: 100 %  24HR Pulse Ox Range:  SpO2  Av.9 %  Min: 91 %  Max: 100 %  Oxygen Amount and Delivery: O2 Flow Rate (L/min): 3.5 L/min    Wt Readings from Last 3 Encounters:   18 123 lb 14.4 oz (56.2 kg)   16 133 lb (60.3 kg)   11/23/15 134 lb 3.2 oz (60.9 kg)       I/O (24 Hours)    Intake/Output Summary (Last 24 hours) at 18 1109  Last data filed at 18 0600   Gross per 24 hour   Intake             1350 ml   Output              600 ml   Net              750 ml       EXAM     General Appearance  Awake, alert, oriented, in no acute distress  HEENT - normocephalic, atraumatic. Neck - Supple,  trachea midline   Lungs - coarse no wheezes  Heart Exam:PMI normal. No lifts, heaves, or thrills. RRR. No murmurs, clicks, gallops, or rubs  Abdomen Exam: Abdomen soft, non-tender.   Extremity Exam: no cyanosis  MEDS      lidocaine  1 patch Transdermal Daily   

## 2018-02-17 NOTE — PROGRESS NOTES
generalized; Cervicodynia; Chronic back pain; Chronic kidney disease; Cigarette smoker; COPD (chronic obstructive pulmonary disease) (HCC); DDD (degenerative disc disease), lumbar; Depression; Family history of cancer; Hernia, ventral; Hyperlipidemia; Iron deficiency anemia; Lichen simplex chronicus; PUD (peptic ulcer disease); Renal cyst; Type II or unspecified type diabetes mellitus without mention of complication, not stated as uncontrolled ([address] and Vitamin D deficiency. Social History:   reports that he has been smoking Cigarettes. He has been smoking about 2.00 packs per day. He does not have any smokeless tobacco history on file. He reports that he does not drink alcohol or use drugs. Family History: No family history on file. Vitals:  BP (!) 144/48   Pulse 70   Temp 98.4 °F (36.9 °C) (Oral)   Resp 22   Ht 5' 8\" (1.727 m)   Wt 123 lb 14.4 oz (56.2 kg)   SpO2 100%   BMI 18.84 kg/m²   Temp (24hrs), Av.4 °F (36.9 °C), Min:97.7 °F (36.5 °C), Max:99 °F (37.2 °C)    Recent Labs      02/15/18   2305  18   1151  18   1712  18   2138   POCGLU  113*  113*  134*  88       I/O (24Hr):     Intake/Output Summary (Last 24 hours) at 18 3930  Last data filed at 18 0600   Gross per 24 hour   Intake             1350 ml   Output              600 ml   Net              750 ml       Labs:    @CBC@  basic metabolic panel    Lab Results   Component Value Date/Time    SPECIAL NOT REPORTED 02/15/2018 06:04 PM     Lab Results   Component Value Date/Time    CULTURE STAPHYLOCOCCUS AUREUS HEAVY GROWTH (A) 02/15/2018 06:04 PM    CULTURE  02/15/2018 06:04 PM     Performed at [facility], [address] [place] [address] [phone]       [facility]    Radiology:    Ct Abdomen Pelvis Wo Contrast Additional Contrast? Radiologist Recommendation    Result Date: 2018  EXAMINATION: CT OF THE ABDOMEN AND PELVIS WITHOUT CONTRAST 2018 11:03 am TECHNIQUE: CT of the abdomen and pelvis hilar fullness has increased. Small pleural effusions are suspected. Chronic interstitial lung changes are again seen. Cardiomegaly. No pulmonary edema. Diffuse osteopenia with thoracic spine degenerative changes. Small pleural effusions. Increased right hilar density may reflect underlying adenopathy. Follow-up with chest CT may be helpful. Xr Femur Right (min 2 Views)    Result Date: 2/12/2018  EXAMINATION: 2 VIEWS OF THE RIGHT FEMUR 2/12/2018 9:41 am COMPARISON: None. HISTORY: ORDERING SYSTEM PROVIDED HISTORY: fall TECHNOLOGIST PROVIDED HISTORY: portable Reason for exam:->fall Ordering Physician Provided Reason for Exam: fall, right femur pain Acuity: Unknown Type of Exam: Unknown FINDINGS: Two views right femur obtained portably show no convincing evidence of acute fracture or dislocation. Mild degenerative changes of the hip. There are vascular calcifications. No radiopaque soft tissue foreign bodies. No localized soft tissue swelling is seen. Surgical clip in the pelvis. If the hip is the area of specific clinical concern dedicated views are recommended. No convincing evidence of acute fracture. Ct Chest Wo Contrast    Result Date: 2/6/2018  EXAMINATION: CT OF THE CHEST WITHOUT CONTRAST 2/6/2018 2:41 pm TECHNIQUE: CT of the chest was performed without the administration of intravenous contrast. Multiplanar reformatted images are provided for review. Dose modulation, iterative reconstruction, and/or weight based adjustment of the mA/kV was utilized to reduce the radiation dose to as low as reasonably achievable. COMPARISON: January 8, 2016 HISTORY: ORDERING SYSTEM PROVIDED HISTORY: ACUTE RESP ILLNESS, >36YEARS OLD TECHNOLOGIST PROVIDED HISTORY: Ordering Physician Provided Reason for Exam: acute respiratory failure Acuity: Unknown Type of Exam: Unknown FINDINGS: Evaluation is limited by motion. Mediastinum: Cardiomegaly. No pericardial effusion. Coronary artery calcifications are seen. hyperinflated and there is generalized interstitial prominence. Trace bilateral pleural effusions versus pleuroparenchymal scarring noted. No obvious focal airspace consolidation or pneumothorax. Osseous structures and soft tissues are grossly stable. Right-sided jugular venous catheter remains in unchanged position. Chronic findings of COPD. No obvious focal airspace consolidation or pneumothorax. Xr Chest Portable    Result Date: 2/12/2018  EXAMINATION: SINGLE VIEW OF THE CHEST 2/12/2018 9:41 am COMPARISON: Chest February 8, 2018. HISTORY: ORDERING SYSTEM PROVIDED HISTORY: line placement TECHNOLOGIST PROVIDED HISTORY: patient is moving to ICU Reason for exam:->line placement Ordering Physician Provided Reason for Exam: right side line placement Acuity: Unknown Type of Exam: Unknown FINDINGS: Interval placement of right-sided IJ line with tip in the SVC. No pneumothorax. Heart and mediastinal structures appear unchanged. COPD changes and vascular congestion and trace bilateral pleural effusions persists. Interval placement of right-sided IJ line with tip in the SVC. No pneumothorax. No significant change chest findings. Xr Chest Portable    Result Date: 2/8/2018  EXAMINATION: SINGLE VIEW OF THE CHEST 2/8/2018 9:05 am COMPARISON: February 5, 2018 HISTORY: ORDERING SYSTEM PROVIDED HISTORY: Crackles on Auscultation TECHNOLOGIST PROVIDED HISTORY: Reason for exam:->Crackles on Auscultation Ordering Physician Provided Reason for Exam: crackles Acuity: Unknown Type of Exam: Unknown FINDINGS: The cardiomediastinal silhouette is stable. There is flattening of the diaphragms, likely related to COPD. There is mild pulmonary vascular congestion. There is mild atelectasis and small pleural effusions at the bilateral lung bases. No evidence of pneumothorax. The osseous structures are stable. Mild cardiomegaly. Mild pulmonary vascular congestion. Probable COPD.  Mild atelectasis and small pleural !        !         ! +------------++-----+-----+--------+--------++----+-----+--------+---------+ ! Location    ! !PSV  ! Ratio! Wave    ! Diameter!!PSV ! Ratio! Wave    ! Diameter ! !            !! !     !Heather Settle.   !        !!    ! !Desc.   !         ! +------------++-----+-----+--------+--------++----+-----+--------+---------+ ! Dist EIA    !!22.3 !     !        !6       !!87.6!     !        !5        ! +------------++-----+-----+--------+--------++----+-----+--------+---------+ ! Common      ! !21.4 !     !        !9       !!40.3!     !        !6        ! ! Femoral     !!     !     !        !        !!    !     !        !         ! +------------++-----+-----+--------+--------++----+-----+--------+---------+ ! PFA         !!17   !0.79 ! !5       !!15.8!0.39 !        !7        ! +------------++-----+-----+--------+--------++----+-----+--------+---------+ ! Prox SFA    !!26   !     !        !7       !!15.3!     !        !15       ! +------------++-----+-----+--------+--------++----+-----+--------+---------+ ! Mid SFA     !!39.7 !1.53 ! !5       !!36  !2.35 !        !5        ! +------------++-----+-----+--------+--------++----+-----+--------+---------+ ! Dist SFA    !!21.5 !0.54 !        !        !!29.9!0.83 ! !5        ! +------------++-----+-----+--------+--------++----+-----+--------+---------+ ! Prox        !!22.3 !1.04 !        !7       !!17.9!0.6  ! !6        ! !Popliteal   !!     !     !        !        !!    !     !        !         ! +------------++-----+-----+--------+--------++----+-----+--------+---------+ ! Dist        !!19.7 !0.88 !        !6       !!15.1!0.84 !        !7        ! !Popliteal   !!     !     !        !        !!    !     !        !         ! +------------++-----+-----+--------+--------++----+-----+--------+---------+ ! Mid PTA     !!39.8 !2.02 !        !        !!37.6!2.49 !        !         ! +------------++-----+-----+--------+--------++----+-----+--------+---------+ popliteal and tibial veins           popliteal and tibial veins  demonstrate normal compressibility   demonstrate normal compressibility  and augmentation. and augmentation. Limited visualization of the         Limited visualization of the  posterior tibial and peroneal veins. posterior tibial and peroneal veins. Normal compressibility of the great  Normal compressibility of the great  saphenous vein. saphenous vein. Normal compressibility of the small  Normal compressibility of the small  saphenous vein. saphenous vein. Velocities are measured in cm/s ; Diameters are measured in mm Right Lower Extremities DVT Study Measurements Right 2D Measurements +------------------------------------+----------+---------------+----------+ ! Location                            ! Visualized! Compressibility! Thrombosis! +------------------------------------+----------+---------------+----------+ ! Common Femoral                      !Yes       ! Yes            ! None      ! +------------------------------------+----------+---------------+----------+ ! Prox Femoral                        !Yes       ! Yes            ! None      ! +------------------------------------+----------+---------------+----------+ ! Mid Femoral                         !Yes       ! Yes            ! None      ! +------------------------------------+----------+---------------+----------+ ! Dist Femoral                        !Yes       ! Yes            ! None      ! +------------------------------------+----------+---------------+----------+ ! Deep Femoral                        !Yes       ! Yes            ! None      ! +------------------------------------+----------+---------------+----------+ ! Popliteal                           !Yes       ! Yes            ! None      ! +------------------------------------+----------+---------------+----------+ ! Sapheno Femoral Junction            ! Yes       ! Yes !None      ! +------------------------------------+----------+---------------+----------+ ! PTV                                 ! Partial   !Yes            ! None      ! +------------------------------------+----------+---------------+----------+ ! Peroneal                            !Partial   !Yes            ! None      ! +------------------------------------+----------+---------------+----------+ ! Gastroc                             ! Yes       ! Yes            ! None      ! +------------------------------------+----------+---------------+----------+ ! GSV Thigh                           ! Yes       ! Yes            ! None      ! +------------------------------------+----------+---------------+----------+ ! GSV Knee                            ! Yes       ! Yes            ! None      ! +------------------------------------+----------+---------------+----------+ ! GSV Ankle                           ! Yes       ! Yes            ! None      ! +------------------------------------+----------+---------------+----------+ ! SSV                                 ! Yes       ! Yes            ! None      ! +------------------------------------+----------+---------------+----------+ Left Lower Extremities DVT Study Measurements Left 2D Measurements +------------------------------------+----------+---------------+----------+ ! Location                            ! Visualized! Compressibility! Thrombosis! +------------------------------------+----------+---------------+----------+ ! Common Femoral                      !Yes       ! Yes            ! None      ! +------------------------------------+----------+---------------+----------+ ! Prox Femoral                        !Yes       ! Yes            ! None      ! +------------------------------------+----------+---------------+----------+ ! Mid Femoral                         !Yes       ! Yes            ! None      ! +------------------------------------+----------+---------------+----------+ ! Dist Femoral !Yes       !Yes            ! None      ! +------------------------------------+----------+---------------+----------+ ! Deep Femoral                        !Yes       ! Yes            ! None      ! +------------------------------------+----------+---------------+----------+ ! Popliteal                           !Yes       ! Yes            ! None      ! +------------------------------------+----------+---------------+----------+ ! Sapheno Femoral Junction            ! Yes       ! Yes            ! None      ! +------------------------------------+----------+---------------+----------+ ! PTV                                 ! Partial   !Yes            ! None      ! +------------------------------------+----------+---------------+----------+ ! Peroneal                            !Partial   !Yes            ! None      ! +------------------------------------+----------+---------------+----------+ ! Gastroc                             ! Yes       ! Yes            ! None      ! +------------------------------------+----------+---------------+----------+ ! GSV Thigh                           ! Yes       ! Yes            ! None      ! +------------------------------------+----------+---------------+----------+ ! GSV Knee                            ! Yes       ! Yes            ! None      ! +------------------------------------+----------+---------------+----------+ ! GSV Ankle                           ! Yes       ! Yes            ! None      ! +------------------------------------+----------+---------------+----------+ ! SSV                                 ! Yes       ! Yes            ! None      ! +------------------------------------+----------+---------------+----------+    Vl Arterial Pvr Lower    Result Date: 2/15/2018    [facility]  Vascular Lower Arterial Plethysmography Procedure   Patient Name   [facility] Date of Study           02/15/2018                 C   Date of Birth  1950  Gender                  Male   Age - Left NICO:0.24. Plethysmographic Digit Evaluation +---------++--------+-----+---------------++--------+-----+----------------+ ! ! !Right   ! ! Left           !!        !     !                ! +---------++--------+-----+---------------++--------+-----+----------------+ ! Location ! !Pressure! Ratio! PPG Wave Form  ! !Pressure! Ratio! PPG Wave Form   ! +---------++--------+-----+---------------++--------+-----+----------------+ ! Great Toe!!20      !0.14 !               !!21      !0.15 !                ! +---------++--------+-----+---------------++--------+-----+----------------+        Physical Examination:        Physical Exam  Constitutional: Vital signs are normal. He appears malnourished. He appears unhealthy. HENT:   Head: Normocephalic and atraumatic. Eyes: Conjunctivae and EOM are normal.   Cardiovascular: Normal rate, regular rhythm and normal heart sounds. Pulmonary/Chest: Effort normal. He has decreased breath sounds (Diffuse). He has wheezes (End Expiratory). On 3 LPM by NC   Abdominal: Soft. Bowel sounds are normal.   Fascial defect present (hx abdominal surg for AAA)   Neurological: He is alert and oriented to person, place, and time. Skin: Skin is warm and dry.    Psychiatric: Affect and judgment normal.        Assessment:        Primary Problem  Acute on chronic respiratory failure with hypercapnia 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]     Gastrointestinal hemorrhage [K92.2]     Abdominal wall hernia [K43.9]     Absolute anemia [D64.9]     Acute on chronic respiratory failure with hypercapnia ([facility]) [J96.22] 02/08/2018    Acute on chronic systolic heart failure (HCC) [I50.23] 02/08/2018    Drug-induced hyperglycemia [R73.9, T50.905A] 02/08/2018    Anemia [D64.9]     Abdominal aortic aneurysm (AAA) ([facility] [I71.4] 02/06/2018    COPD exacerbation ([facility]) [J44.1] 02/05/2018    Type 2 diabetes mellitus without complication, with long-term current use of insulin (HCC) [E11.9, Z79.4]     Chronic kidney disease [N18.9]        Plan:        Transfer ICU intermediate    1. Acute on Chronic Respiratory Failure  -Pulm following  -Continue Duonebs, dulera, levaquin, solumedrol (tapering)  - Zyvox for S. Aureus in sputum Cx, awaiting susceptibility  -Bipap at night, NC at 3LPM by day     2. Systolic CHF  -EF 33%  -S/p HD for hypervolemia, last session 2/16  -Cardiology signed off     3. ESRD, ALBERTINA on CKD  -Aranesp  -Has temporary roman   - tunneled cath placement planned for Monday by Gen Surg  -Nephro following     4. Anemia of chronic disease +/- blood loss?, resolved  -GI following  -S/p 2 units pRBCs on 2/7, hgb ~ 7.5-8.5  -Venofer per nephro x 8 doses, last dose 2/16     5. HTN  -Coreg6.25 BID, hydralazine 50 mg TID, Imdur 30mg QD       6. Hyperglycemia  -Check BS, SSI     7. Nodules on CT Chest  -Smoking hx, 40 pack years  -Increasing lymphadenopathy since Jan 2016  -Outpatient follow up with pulm     8. UTI  -Coag neg staph susceptible to oxacillin  - IV Nafcillin     9. Left Foot Drop  - NICO/PVR: B/L Critical Ischemia of Aorto-iliac arteries  -Neuro consulted: arterial dopplers ordered, possible EMG later  - Vascular on Board, No intervention at this time       Noreen Monaco MD  2/17/2018  9:07 AM       IM Attending    Pt seen and examined today   I have discussed the care of pt , including pertinent history and exam findings,  with the resident. I have reviewed the key elements of all parts of the encounter with the resident. I agree with the assessment, plan and orders as documented by the resident.     Electronically signed by Kristy Quintero MD on 2/17/2018 at 11:05 AM

## 2018-02-17 NOTE — PROGRESS NOTES
Discussed with nurse  Afebrile. Vitals stable  Off high flow Oxygen.  Breathing better    Plan to transfer to progressive care  Tunneled hemodialysis catheter placement scheduled for Monday

## 2018-02-17 NOTE — PLAN OF CARE
Problem: Gas Exchange - Impaired:  Goal: Levels of oxygenation will improve  Levels of oxygenation will improve   Outcome: Met This Shift  Patient's O2 stats has remained stable throughout shift on 3.5 L on nasal canula     Problem: Falls - Risk of  Goal: Absence of falls  Outcome: Met This Shift  Patient remains free from falls throughout shift. Safety measures are in place     Problem: Pain:  Goal: Control of acute pain  Control of acute pain   Outcome: Ongoing  Patient is able to make needs known to RN about increasing pain right leg. Prescribed analgesics were given as ordered throughout the shift. Patient stated that the current regimen was controlling his pain better     Problem: Risk for Impaired Skin Integrity  Goal: Tissue integrity - skin and mucous membranes  Structural intactness and normal physiological function of skin and  mucous membranes. Outcome: Met This Shift  No new skin breakdown was noted this shift. Patient is able to turn and adjust self as needed. Problem: Nutrition  Goal: Optimal nutrition therapy  Outcome: Ongoing  Patient states that he does not have much of an appetite. He finished approximately half of both breakfast and lunch.

## 2018-02-18 ENCOUNTER — ANESTHESIA EVENT (OUTPATIENT)
Dept: OPERATING ROOM | Age: 68
DRG: 673 | End: 2018-02-18
Payer: COMMERCIAL

## 2018-02-18 LAB
-: ABNORMAL
AMORPHOUS: ABNORMAL
ANION GAP SERPL CALCULATED.3IONS-SCNC: 8 MMOL/L (ref 9–17)
BACTERIA: ABNORMAL
BILIRUBIN URINE: NEGATIVE
BUN BLDV-MCNC: 54 MG/DL (ref 8–23)
BUN/CREAT BLD: ABNORMAL (ref 9–20)
CALCIUM SERPL-MCNC: 8.5 MG/DL (ref 8.6–10.4)
CASTS UA: ABNORMAL /LPF
CHLORIDE BLD-SCNC: 96 MMOL/L (ref 98–107)
CO2: 29 MMOL/L (ref 20–31)
COLOR: YELLOW
COMMENT UA: ABNORMAL
CREAT SERPL-MCNC: 2.86 MG/DL (ref 0.7–1.2)
CRYSTALS, UA: ABNORMAL /HPF
CULTURE: ABNORMAL
CULTURE: NORMAL
DIRECT EXAM: ABNORMAL
EPITHELIAL CELLS UA: ABNORMAL /HPF
GFR AFRICAN AMERICAN: 27 ML/MIN
GFR NON-AFRICAN AMERICAN: 22 ML/MIN
GFR SERPL CREATININE-BSD FRML MDRD: ABNORMAL ML/MIN/{1.73_M2}
GFR SERPL CREATININE-BSD FRML MDRD: ABNORMAL ML/MIN/{1.73_M2}
GLUCOSE BLD-MCNC: 126 MG/DL (ref 70–99)
GLUCOSE BLD-MCNC: 149 MG/DL (ref 75–110)
GLUCOSE BLD-MCNC: 80 MG/DL (ref 75–110)
GLUCOSE URINE: ABNORMAL
HCT VFR BLD CALC: 24.3 % (ref 41–53)
HEMOGLOBIN: 7.8 G/DL (ref 13.5–17.5)
KETONES, URINE: NEGATIVE
LEUKOCYTE ESTERASE, URINE: NEGATIVE
Lab: ABNORMAL
Lab: NORMAL
MCH RBC QN AUTO: 32.3 PG (ref 26–34)
MCHC RBC AUTO-ENTMCNC: 32.3 G/DL (ref 31–37)
MCV RBC AUTO: 99.9 FL (ref 80–100)
MUCUS: ABNORMAL
NITRITE, URINE: NEGATIVE
NRBC AUTOMATED: ABNORMAL PER 100 WBC
ORGANISM: ABNORMAL
OTHER OBSERVATIONS UA: ABNORMAL
PDW BLD-RTO: 18.5 % (ref 11.5–14.9)
PH UA: 5.5 (ref 5–8)
PLATELET # BLD: 116 K/UL (ref 150–450)
PMV BLD AUTO: 8 FL (ref 6–12)
POTASSIUM SERPL-SCNC: 4.6 MMOL/L (ref 3.7–5.3)
PROTEIN UA: ABNORMAL
RBC # BLD: 2.43 M/UL (ref 4.5–5.9)
RBC UA: ABNORMAL /HPF
RENAL EPITHELIAL, UA: ABNORMAL /HPF
SODIUM BLD-SCNC: 133 MMOL/L (ref 135–144)
SPECIFIC GRAVITY UA: 1.01 (ref 1–1.03)
SPECIMEN DESCRIPTION: ABNORMAL
SPECIMEN DESCRIPTION: ABNORMAL
SPECIMEN DESCRIPTION: NORMAL
STATUS: ABNORMAL
STATUS: NORMAL
STATUS: NORMAL
TRICHOMONAS: ABNORMAL
TURBIDITY: CLEAR
URINE HGB: NEGATIVE
UROBILINOGEN, URINE: NORMAL
WBC # BLD: 8.1 K/UL (ref 3.5–11)
WBC UA: ABNORMAL /HPF
YEAST: ABNORMAL

## 2018-02-18 PROCEDURE — 97110 THERAPEUTIC EXERCISES: CPT

## 2018-02-18 PROCEDURE — 6370000000 HC RX 637 (ALT 250 FOR IP): Performed by: STUDENT IN AN ORGANIZED HEALTH CARE EDUCATION/TRAINING PROGRAM

## 2018-02-18 PROCEDURE — 6370000000 HC RX 637 (ALT 250 FOR IP): Performed by: NURSE PRACTITIONER

## 2018-02-18 PROCEDURE — 6360000002 HC RX W HCPCS: Performed by: FAMILY MEDICINE

## 2018-02-18 PROCEDURE — 6370000000 HC RX 637 (ALT 250 FOR IP): Performed by: FAMILY MEDICINE

## 2018-02-18 PROCEDURE — 97530 THERAPEUTIC ACTIVITIES: CPT

## 2018-02-18 PROCEDURE — 94762 N-INVAS EAR/PLS OXIMTRY CONT: CPT

## 2018-02-18 PROCEDURE — 2500000003 HC RX 250 WO HCPCS: Performed by: STUDENT IN AN ORGANIZED HEALTH CARE EDUCATION/TRAINING PROGRAM

## 2018-02-18 PROCEDURE — 6370000000 HC RX 637 (ALT 250 FOR IP): Performed by: INTERNAL MEDICINE

## 2018-02-18 PROCEDURE — 51702 INSERT TEMP BLADDER CATH: CPT

## 2018-02-18 PROCEDURE — 6360000002 HC RX W HCPCS: Performed by: INTERNAL MEDICINE

## 2018-02-18 PROCEDURE — 2060000000 HC ICU INTERMEDIATE R&B

## 2018-02-18 PROCEDURE — 82947 ASSAY GLUCOSE BLOOD QUANT: CPT

## 2018-02-18 PROCEDURE — 6360000002 HC RX W HCPCS: Performed by: STUDENT IN AN ORGANIZED HEALTH CARE EDUCATION/TRAINING PROGRAM

## 2018-02-18 PROCEDURE — 2580000003 HC RX 258: Performed by: EMERGENCY MEDICINE

## 2018-02-18 PROCEDURE — 99232 SBSQ HOSP IP/OBS MODERATE 35: CPT | Performed by: INTERNAL MEDICINE

## 2018-02-18 PROCEDURE — 94640 AIRWAY INHALATION TREATMENT: CPT

## 2018-02-18 PROCEDURE — 94660 CPAP INITIATION&MGMT: CPT

## 2018-02-18 PROCEDURE — 81001 URINALYSIS AUTO W/SCOPE: CPT

## 2018-02-18 PROCEDURE — 80048 BASIC METABOLIC PNL TOTAL CA: CPT

## 2018-02-18 PROCEDURE — 2580000003 HC RX 258: Performed by: NURSE PRACTITIONER

## 2018-02-18 PROCEDURE — S0028 INJECTION, FAMOTIDINE, 20 MG: HCPCS | Performed by: STUDENT IN AN ORGANIZED HEALTH CARE EDUCATION/TRAINING PROGRAM

## 2018-02-18 PROCEDURE — 36415 COLL VENOUS BLD VENIPUNCTURE: CPT

## 2018-02-18 PROCEDURE — 85027 COMPLETE CBC AUTOMATED: CPT

## 2018-02-18 PROCEDURE — 51798 US URINE CAPACITY MEASURE: CPT

## 2018-02-18 RX ORDER — OXYCODONE HYDROCHLORIDE 5 MG/1
5 TABLET ORAL EVERY 4 HOURS PRN
Status: DISCONTINUED | OUTPATIENT
Start: 2018-02-18 | End: 2018-02-27 | Stop reason: HOSPADM

## 2018-02-18 RX ORDER — PREDNISONE 20 MG/1
20 TABLET ORAL 2 TIMES DAILY
Status: DISCONTINUED | OUTPATIENT
Start: 2018-02-18 | End: 2018-02-19

## 2018-02-18 RX ORDER — OXYCODONE HYDROCHLORIDE 5 MG/1
5 TABLET ORAL EVERY 6 HOURS PRN
Status: DISCONTINUED | OUTPATIENT
Start: 2018-02-18 | End: 2018-02-18

## 2018-02-18 RX ORDER — TAMSULOSIN HYDROCHLORIDE 0.4 MG/1
0.4 CAPSULE ORAL DAILY
Status: DISCONTINUED | OUTPATIENT
Start: 2018-02-18 | End: 2018-02-27 | Stop reason: HOSPADM

## 2018-02-18 RX ORDER — OXYCODONE HYDROCHLORIDE AND ACETAMINOPHEN 5; 325 MG/1; MG/1
1 TABLET ORAL EVERY 4 HOURS PRN
Status: DISCONTINUED | OUTPATIENT
Start: 2018-02-18 | End: 2018-02-18

## 2018-02-18 RX ORDER — OXYCODONE HYDROCHLORIDE AND ACETAMINOPHEN 5; 325 MG/1; MG/1
1 TABLET ORAL EVERY 4 HOURS PRN
Status: DISCONTINUED | OUTPATIENT
Start: 2018-02-18 | End: 2018-02-27 | Stop reason: HOSPADM

## 2018-02-18 RX ADMIN — OXYCODONE HYDROCHLORIDE AND ACETAMINOPHEN 1 TABLET: 5; 325 TABLET ORAL at 10:57

## 2018-02-18 RX ADMIN — HYDRALAZINE HYDROCHLORIDE 50 MG: 50 TABLET, FILM COATED ORAL at 06:39

## 2018-02-18 RX ADMIN — Medication 10 ML: at 08:25

## 2018-02-18 RX ADMIN — LINEZOLID 600 MG: 600 INJECTION, SOLUTION INTRAVENOUS at 10:57

## 2018-02-18 RX ADMIN — DOCUSATE SODIUM 200 MG: 100 CAPSULE, LIQUID FILLED ORAL at 08:25

## 2018-02-18 RX ADMIN — INSULIN LISPRO 2 UNITS: 100 INJECTION, SOLUTION INTRAVENOUS; SUBCUTANEOUS at 12:06

## 2018-02-18 RX ADMIN — CARVEDILOL 6.25 MG: 6.25 TABLET, FILM COATED ORAL at 08:25

## 2018-02-18 RX ADMIN — PREDNISONE 20 MG: 20 TABLET ORAL at 21:24

## 2018-02-18 RX ADMIN — Medication 2 PUFF: at 21:23

## 2018-02-18 RX ADMIN — INSULIN LISPRO 1 UNITS: 100 INJECTION, SOLUTION INTRAVENOUS; SUBCUTANEOUS at 21:32

## 2018-02-18 RX ADMIN — IPRATROPIUM BROMIDE AND ALBUTEROL SULFATE 1 AMPULE: .5; 3 SOLUTION RESPIRATORY (INHALATION) at 19:13

## 2018-02-18 RX ADMIN — METHYLPREDNISOLONE SODIUM SUCCINATE 30 MG: 40 INJECTION, POWDER, FOR SOLUTION INTRAMUSCULAR; INTRAVENOUS at 06:39

## 2018-02-18 RX ADMIN — OXYCODONE HYDROCHLORIDE AND ACETAMINOPHEN 1 TABLET: 5; 325 TABLET ORAL at 21:23

## 2018-02-18 RX ADMIN — HYDRALAZINE HYDROCHLORIDE 50 MG: 50 TABLET, FILM COATED ORAL at 21:23

## 2018-02-18 RX ADMIN — METHYLPREDNISOLONE SODIUM SUCCINATE 30 MG: 40 INJECTION, POWDER, FOR SOLUTION INTRAMUSCULAR; INTRAVENOUS at 14:46

## 2018-02-18 RX ADMIN — FENTANYL CITRATE 25 MCG: 50 INJECTION INTRAMUSCULAR; INTRAVENOUS at 13:26

## 2018-02-18 RX ADMIN — OXYCODONE HYDROCHLORIDE AND ACETAMINOPHEN 1 TABLET: 5; 325 TABLET ORAL at 04:48

## 2018-02-18 RX ADMIN — IPRATROPIUM BROMIDE AND ALBUTEROL SULFATE 1 AMPULE: .5; 3 SOLUTION RESPIRATORY (INHALATION) at 15:10

## 2018-02-18 RX ADMIN — OXYCODONE HYDROCHLORIDE 5 MG: 5 TABLET ORAL at 16:50

## 2018-02-18 RX ADMIN — OXYCODONE HYDROCHLORIDE 5 MG: 5 TABLET ORAL at 10:57

## 2018-02-18 RX ADMIN — OXYCODONE HYDROCHLORIDE 5 MG: 5 TABLET ORAL at 21:23

## 2018-02-18 RX ADMIN — HYDRALAZINE HYDROCHLORIDE 50 MG: 50 TABLET, FILM COATED ORAL at 14:40

## 2018-02-18 RX ADMIN — CARVEDILOL 6.25 MG: 6.25 TABLET, FILM COATED ORAL at 16:50

## 2018-02-18 RX ADMIN — HEPARIN SODIUM 5000 UNITS: 5000 INJECTION, SOLUTION INTRAVENOUS; SUBCUTANEOUS at 14:40

## 2018-02-18 RX ADMIN — FAMOTIDINE 10 MG: 10 INJECTION, SOLUTION INTRAVENOUS at 08:25

## 2018-02-18 RX ADMIN — HEPARIN SODIUM 5000 UNITS: 5000 INJECTION, SOLUTION INTRAVENOUS; SUBCUTANEOUS at 21:24

## 2018-02-18 RX ADMIN — LEVOFLOXACIN 250 MG: 5 INJECTION, SOLUTION INTRAVENOUS at 12:06

## 2018-02-18 RX ADMIN — HEPARIN SODIUM 5000 UNITS: 5000 INJECTION, SOLUTION INTRAVENOUS; SUBCUTANEOUS at 06:39

## 2018-02-18 RX ADMIN — IPRATROPIUM BROMIDE AND ALBUTEROL SULFATE 1 AMPULE: .5; 3 SOLUTION RESPIRATORY (INHALATION) at 11:27

## 2018-02-18 RX ADMIN — Medication 2 PUFF: at 08:25

## 2018-02-18 RX ADMIN — OXYCODONE HYDROCHLORIDE AND ACETAMINOPHEN 1 TABLET: 5; 325 TABLET ORAL at 16:50

## 2018-02-18 RX ADMIN — Medication 10 ML: at 21:23

## 2018-02-18 RX ADMIN — TAMSULOSIN HYDROCHLORIDE 0.4 MG: 0.4 CAPSULE ORAL at 10:57

## 2018-02-18 RX ADMIN — OXYCODONE HYDROCHLORIDE 5 MG: 5 TABLET ORAL at 04:49

## 2018-02-18 RX ADMIN — ISOSORBIDE MONONITRATE 30 MG: 30 TABLET, EXTENDED RELEASE ORAL at 08:25

## 2018-02-18 RX ADMIN — IPRATROPIUM BROMIDE AND ALBUTEROL SULFATE 1 AMPULE: .5; 3 SOLUTION RESPIRATORY (INHALATION) at 05:59

## 2018-02-18 ASSESSMENT — PAIN DESCRIPTION - LOCATION
LOCATION: GENERALIZED
LOCATION: GENERALIZED
LOCATION: BUTTOCKS;LEG
LOCATION: BUTTOCKS;LEG

## 2018-02-18 ASSESSMENT — PAIN SCALES - GENERAL
PAINLEVEL_OUTOF10: 7
PAINLEVEL_OUTOF10: 4
PAINLEVEL_OUTOF10: 7
PAINLEVEL_OUTOF10: 7
PAINLEVEL_OUTOF10: 6
PAINLEVEL_OUTOF10: 6
PAINLEVEL_OUTOF10: 4
PAINLEVEL_OUTOF10: 7
PAINLEVEL_OUTOF10: 8
PAINLEVEL_OUTOF10: 4
PAINLEVEL_OUTOF10: 4
PAINLEVEL_OUTOF10: 7

## 2018-02-18 ASSESSMENT — PAIN DESCRIPTION - DESCRIPTORS
DESCRIPTORS: ACHING
DESCRIPTORS: ACHING;CRAMPING

## 2018-02-18 ASSESSMENT — LIFESTYLE VARIABLES: SMOKING_STATUS: 1

## 2018-02-18 ASSESSMENT — PAIN DESCRIPTION - PAIN TYPE
TYPE: CHRONIC PAIN

## 2018-02-18 ASSESSMENT — PAIN DESCRIPTION - ORIENTATION
ORIENTATION: RIGHT;LEFT
ORIENTATION: RIGHT;LEFT

## 2018-02-18 ASSESSMENT — PAIN DESCRIPTION - FREQUENCY: FREQUENCY: CONTINUOUS

## 2018-02-18 ASSESSMENT — PAIN DESCRIPTION - ONSET: ONSET: ON-GOING

## 2018-02-18 ASSESSMENT — COPD QUESTIONNAIRES: CAT_SEVERITY: NO INTERVAL CHANGE

## 2018-02-18 NOTE — PROGRESS NOTES
[address] [facility]    Progress Note    2/18/2018    9:39 AM    Name:   Golden Montejo  MRN:     [id number]     [place]:      [de-identified]   Room:   [facility]8/2098[facility] Day:  15  Admit Date:  2/5/2018  7:29 PM    PCP:   Jerie Ormond, MD  Code Status:  DNR-CCA    Subjective:     C/C:   Chief Complaint   Patient presents with    Shortness of Breath     Interval History Status: improved. Patient seen lying comfortably in bed. Tolerated BiPAP overnight. Denies any new onset chest pain, or SOB. States that his R foot pain improved with lidocaine patch. Per nursing, Pt had Void trail after indwelling diggs catheter from 2/12 was removed. Pt was unable to void, and diggs replaces with output of approx 600 mL. Brief History:        The patient is a 75 y.o. Non-/non  male wit hPMHx of COPD on 3 LPM, Systolic CHF, and CKD baseline Cr of 2.90 who presents with Shortness of Breath. Patient was at [facility] on 2/1 for similar complaint, and was given Levaquin and medrol dose pack PO. At the time, he had a productive cough, and chest pain with coughing. He had no improvement in his Sx, and came to ED for revaluation. In the ED, his O2 sat was at 85% on 3 LPM. He was given 4 LPM by Venturi mask. Pulmonology was consulted. CXR showed small B/L Pleural effusions and R sided hilar density. His Cr was also elevated at 3.70, so nephrology was consulted. Hgb was 7.0 in ED and he was given 1 U PRBC. Pt denied SANJAY, but reports possible bloody stools. He was given IV solumedrol bolus, and admitted to ICU intermediate   for the management of  Acute on Chronic Respiratory Failure     Review of Systems:     ROS  Constitutional: Negative for chills and fever. Eyes: Negative for blurred vision and double vision. Respiratory: Negative for cough, sputum production, shortness of breath and wheezing.     Cardiovascular: Negative for chest pain, claudication and reformatted images are provided for review. Dose modulation, iterative reconstruction, and/or weight based adjustment of the mA/kV was utilized to reduce the radiation dose to as low as reasonably achievable. COMPARISON: CT abdomen/ pelvis from 01/18/2010. HISTORY: ORDERING SYSTEM PROVIDED HISTORY: R/O Retroperitoneal bleed. HbG continues to drop s/p 2 U PRBCs TECHNOLOGIST PROVIDED HISTORY: Additional Contrast?->Radiologist Recommendation Ordering Physician Provided Reason for Exam: DR LOOKING FOR A BLED. BLOOD COUNTS ARE GOING DOWN. PT IS ALLERGIC TO IV CONTRAST. GFR IS TOO LOW TO INJECT. FINDINGS: Lung bases: There is progression of centrilobular emphysematous changes in the lung bases. There are peripheral nodular opacities, may reflect atelectasis versus true lung nodules. No pleural or pericardial effusion. There is moderate multichamber cardiac enlargement. Organs: The liver has normal size and contours. No focal intrahepatic mass lesion identified. No extrahepatic biliary ductal dilatation. The gallbladder is present. The main portal vein is prominent in appearance. The spleen, pancreas and adrenal glands have a normal noncontrast CT appearance. The kidneys are symmetric in size and noncontrast appearance. There are cortical hypodensities in the bilateral kidneys, which are incompletely evaluated on the basis of this noncontrast study, but all have attenuation characteristics consistent with simple fluid and statistically most likely relate to benign renal cysts. Nonemergent renal ultrasound could be considered for further evaluation, as clinically indicated. No suspicious renal lesions identified. No obstructive uropathy. GI/bowel:  Similar appearance of ligamentous laxity versus large anterior abdominal wall defect containing small and large bowel loops. There is a large stool burden in the colon. No dilated loops of bowel, or findings to suggest obstruction.   No mural thickening or adjacent noncontrast study. Atherosclerotic calcification of the thoracic aorta. Lungs/Pleura: Trace pleural effusions. No pneumothorax. Chronic interstitial lung changes and emphysematous changes are again seen. Scarring is seen at the lung apices. No focal consolidation. Bronchiectasis is seen within the lower lobes. Adjacent to a vessel within the left lower lobe there is a 4 mm nodule series 4, image 70, not definitely seen on the prior study. A 4 mm nodular opacity is seen within the left lower lobe series 4, image 86, not definitely seen on the prior study. Within the right upper lobe there is a 9 mm spiculated nodule suboptimally visualized due to motion at this level series 4, image 34. This is not clearly seen on the prior study. Spiculated nodules along the right fissure appear smaller than on the previous study. A 10 mm subpleural nodule is seen within the superior segment of the right lower lobe series 4, image 41, not clearly seen on the prior study. Spiculated right lower lobe nodule measuring 13 mm series 4, image 63 is not clearly seen on the previous study. Multiple nodular opacities are seen at the right lung base, increased from the previous study. For example there is a subpleural nodule measuring 9 mm series 4, image 105. Upper Abdomen: Limited views of the upper abdomen demonstrate a large cyst within the left kidney. No adrenal nodule. Soft Tissues/Bones: Vascular stent is seen along the left chest wall. No axillary adenopathy. No acute osseous abnormality. Multiple pulmonary nodules not seen on the previous study are concerning for malignancy. PET scan may be helpful for additional evaluation.      Us Renal Complete    Result Date: 2/6/2018  EXAMINATION: RETROPERITONEAL ULTRASOUND OF THE KIDNEYS AND URINARY BLADDER 2/6/2018 COMPARISON: May 17, 2010 HISTORY: ORDERING SYSTEM PROVIDED HISTORY: RENAL FAILURE, CHRONIC (KIDNEY DISEASE) TECHNOLOGIST PROVIDED HISTORY: Acuity: Acute Type of Exam: Initial FINDINGS: Kidneys: The right kidney measures 9.6 cm in length and the left kidney measures 10 cm in length. Abnormally increased echogenicity is noted. No hydronephrosis or stones identified. Multiple bilateral cysts are present measuring up to 5.3 cm in the lower pole right kidney. Bladder: The bladder is well distended. Echogenic kidneys, consistent with intrinsic renal parenchymal disease. Bilateral renal cysts are noted. Xr Chest Portable    Result Date: 2/14/2018  EXAMINATION: SINGLE VIEW OF THE CHEST, 2/14/2018 6:33 am COMPARISON: 02/13/2018 HISTORY: ORDERING SYSTEM PROVIDED HISTORY: dyspnea TECHNOLOGIST PROVIDED HISTORY: Reason for exam:->dyspnea Ordering Physician Provided Reason for Exam: DYSPNEA Acuity: Unknown Type of Exam:  Unknown FINDINGS: Right IJ central venous catheter is stable in position. Cardiomediastinal silhouette and pulmonary vasculature are stable. Scattered pleural and parenchymal scarring in the lungs as well as pulmonary hyperinflation is unchanged. There is no new airspace consolidation. No sizable pneumothorax or pleural effusion. No free air beneath the diaphragm. No evidence of acute osseous abnormality. Stable appearance of the chest.  No acute findings. Xr Chest Portable    Result Date: 2/13/2018  EXAMINATION: SINGLE VIEW OF THE CHEST 2/13/2018 6:30 am COMPARISON: 02/12/2018 HISTORY: ORDERING SYSTEM PROVIDED HISTORY: worsening dyspnea; on Bipap TECHNOLOGIST PROVIDED HISTORY: Reason for exam:->worsening dyspnea; on Bipap Acuity: Unknown Type of Exam: Unknown Additional signs and symptoms: Increased dyspnea; pt on bipap. FINDINGS: Cardiac silhouette is borderline prominent. There are atherosclerotic calcifications of the aortic arch. Right-sided jugular venous catheter terminates overlying the expected location of the SVC, in grossly unchanged positioning. Lungs remain hyperinflated and there is generalized interstitial prominence.   Trace amplitude, monophasic  waveform at the thigh, calf and      waveform at the thigh, calf and ankle  ankle levels. levels. Decreased amplitude digit waveform. Decreased amplitude digit waveform. Significant pressure reduction       Significant pressure reduction  between the brachial cuff and thigh  between the brachial cuff and thigh  cuff suggesting aorto-iliac disease. cuff suggesting aorto-iliac disease. Significant pressure reduction       NICO demonstrates critical limb  between the calf and ankle cuffs     ischemia.  suggesting tibial artery disease. TBI demonstrates critical limb  NICO demonstrates critical limb       ischemia. ischemia. TBI demonstrates critical limb  ischemia. Velocities are measured in cm/s ; Diameters are measured in mm Pressures +--------------------------------------++--------+-----+----+--------+-----+ ! ! !Right   ! ! Left!        !     ! +--------------------------------------++--------+-----+----+--------+-----+ ! Location                              ! !Pressure! Ratio! !Pressure! Ratio! +--------------------------------------++--------+-----+----+--------+-----+ ! Thigh                                 !!48      !0.35 ! !50      !0.36 ! +--------------------------------------++--------+-----+----+--------+-----+ ! Calf                                  !!136     !0.99 !    !49      !0.36 ! +--------------------------------------++--------+-----+----+--------+-----+ ! Ankle PT                              !!30      !0.22 !    !33      !0.24 ! +--------------------------------------++--------+-----+----+--------+-----+ ! Ankle DP                              !!39      !0.28 !    !33      !0.24 ! +--------------------------------------++--------+-----+----+--------+-----+ ! Great Toe                             !!20      !0.14 !    !21      !0.15 !

## 2018-02-18 NOTE — PLAN OF CARE
Problem: Discharge Planning:  Goal: Discharged to appropriate level of care  Discharged to appropriate level of care   Outcome: Ongoing      Problem: Activity Intolerance:  Goal: Ability to tolerate increased activity will improve  Ability to tolerate increased activity will improve   Outcome: Ongoing      Problem: Airway Clearance - Ineffective:  Goal: Ability to maintain a clear airway will improve  Ability to maintain a clear airway will improve   Outcome: Ongoing  Pt resting comfortable in bed at this time. No distress noted. Assessment remains as charted. Pt tolerated bipap all night    Problem: Breathing Pattern - Ineffective:  Goal: Ability to achieve and maintain a regular respiratory rate will improve  Ability to achieve and maintain a regular respiratory rate will improve   Outcome: Ongoing      Problem: Gas Exchange - Impaired:  Goal: Levels of oxygenation will improve  Levels of oxygenation will improve   Outcome: Ongoing      Problem: Falls - Risk of  Goal: Absence of falls  Outcome: Ongoing  Pt remains free from falls this shift. Bed in low position, call light in reach, side rails up x 2, Cont to monior closely    Problem: Pain:  Goal: Pain level will decrease  Pain level will decrease   Outcome: Ongoing  See MAR, Give meds as needed  Assist pt with repositioning for comfort. Cont to monitor closely  Goal: Control of acute pain  Control of acute pain   Outcome: Ongoing    Goal: Control of chronic pain  Control of chronic pain   Outcome: Ongoing      Problem: Risk for Impaired Skin Integrity  Goal: Tissue integrity - skin and mucous membranes  Structural intactness and normal physiological function of skin and  mucous membranes. Outcome: Ongoing  Keep skin clean and dry.   Assist pt with repositioning q2h and prn

## 2018-02-18 NOTE — PROGRESS NOTES
Bipap on standby at this time. Pulse Ox 99% on 2L   BS Diminished. Rhonchi in bases  Nasal gel pad available at bedside. Pt awake/alert/no distress noted.

## 2018-02-18 NOTE — PROGRESS NOTES
Pulmonary Progress Note  [facility]      Patient - Kiersten Slade,  Age - 75 y.o.    - 1950      Room Number - 2098/2098-01   [facility] -  [id number]   [facility]  Date of Admission -  2018  7:29 PM        Consulting Tremaine Guillen MD  Primary Care Physician - Rice Cooks, MD     SUBJECTIVE   Smiling, in good spirits, dialysis went well yesterday, scheduled for tunnel TOMORROW    OBJECTIVE   VITALS    height is 5' 8\" (1.727 m) and weight is 120 lb 2.4 oz (54.5 kg). His oral temperature is 98 °F (36.7 °C). His blood pressure is 102/55 (abnormal) and his pulse is 75. His respiration is 18 and oxygen saturation is 97%. Body mass index is 18.27 kg/m². Temperature Range: Temp: 98 °F (36.7 °C) Temp  Av °F (36.7 °C)  Min: 97.7 °F (36.5 °C)  Max: 98.4 °F (36.9 °C)  BP Range:  Systolic (83YTQ), MIKE:181 , Min:102 , LMR:773     Diastolic (22HIQ), MKK:76, Min:35, Max:64    Pulse Range: Pulse  Av.4  Min: 65  Max: 78  Respiration Range: Resp  Av  Min: 16  Max: 24  Current Pulse Ox[de-identified]  SpO2: 97 %  24HR Pulse Ox Range:  SpO2  Av.2 %  Min: 90 %  Max: 100 %  Oxygen Amount and Delivery: O2 Flow Rate (L/min): 2 L/min    Wt Readings from Last 3 Encounters:   18 120 lb 2.4 oz (54.5 kg)   16 133 lb (60.3 kg)   11/23/15 134 lb 3.2 oz (60.9 kg)       I/O (24 Hours)    Intake/Output Summary (Last 24 hours) at 18 1543  Last data filed at 18 0944   Gross per 24 hour   Intake              420 ml   Output              600 ml   Net             -180 ml       EXAM     General Appearance  Awake, alert, oriented, in no acute distress  HEENT - normocephalic, atraumatic.  []  Mallampati  [] Crowded airway   [] Macroglossia  []  Retrognathia  [] Micrognathia  []  Normal tongue size []  Normal Bite  [] Iberia sign positive    Neck - Supple,  trachea midline   Lungs - Medical expansion, clear

## 2018-02-18 NOTE — FLOWSHEET NOTE
02/18/18 1330   Provider Notification   Reason for Communication Review case   Provider Name Resident   Provider Notification Resident   Method of Communication Call   Response See orders   Notification Time 1330     Discussed with the residents the patient's previous pain medication schedule of Q4H, at this time they are changing his Q6 back to Q4H. See orders.

## 2018-02-18 NOTE — PROGRESS NOTES
Microbiology laboratory called and reported MRSA in the patient's sputum culture. No further questions or concerns at this time. Patient already in contact precautions for MRSA. Pulmonology will be updated regarding these findings when rounding.

## 2018-02-18 NOTE — PROGRESS NOTES
30*  22*   GFRAA  26*  36*  27*         Assessment/Plan:     1. Acute kidney injury superimposed on chronic kidney disease stage IV - will continue to monitor closely. Renal function is worseningWith holding of dialysis. It appears this gentleman would benefit from chronic hemodialysis given underlying chronic kidney disease stage IV and systolic heart failure with ejection fraction 20-25%. We will change dialysis catheter to tunneled catheter tomorrow. We will schedule for acute hemodialysis tomorrow. Consult social service to arrange for outpatient dialysis spot. 2. Dyspnea - secondary to acute exacerbation of chronic obstructive pulmonary disease as well as fluid overload secondary to decompensated systolic heart failure. Will monitor response to ultrafiltration. SOB related to AECOPD/fluid overload/AcuteDecompensated CHF with elevated BNP/renal failure     3. Anemia of chronic kidney disease - iron studies consistent with iron deficiency (Ferritin 153 and iron saturation 13%). Completed course of Venofer IV  Aranesp 100 mcg weekly.     4.  Cachexia with active smoking hx-r/o malignancy/multiple nodules on chest CT.     5.  Proteinuria (5 grams) -Secondary to diabetic nephropathy. 6. Hypertension - Controlled.     Eric Zurita MD.  Attending Nephrologist

## 2018-02-18 NOTE — PROGRESS NOTES
D/W nurse  Afebrile.  Vitals stable  Breathing better  Luis catheter site clean and dry    Plan tunneled catheter placement in AM

## 2018-02-18 NOTE — ANESTHESIA PRE PROCEDURE
Department of Anesthesiology  Preprocedure Note       Name:  Marilou Damon   Age:  75 y.o.  :  1950                                          MRN:  [id number]         Date:  2018      Surgeon: Namrata Carpenter):  Ashleigh Carter DO    Procedure: Procedure(s):  CATHETER INSERTION HEMODIALYSIS    Medications prior to admission:   Prior to Admission medications    Medication Sig Start Date End Date Taking? Authorizing Provider   carvedilol (COREG) 6.25 MG tablet Take 6.25 mg by mouth 2 times daily (with meals)   Yes Historical Provider, MD   hydrALAZINE (APRESOLINE) 10 MG tablet Take 10 mg by mouth 3 times daily   Yes Historical Provider, MD   sodium bicarbonate 650 MG tablet Take 650 mg by mouth 2 times daily   Yes Historical Provider, MD   tamsulosin (FLOMAX) 0.4 MG capsule Take 0.4 mg by mouth daily   Yes Historical Provider, MD   oxyCODONE-acetaminophen (PERCOCET)  MG per tablet  16  Yes Historical Provider, MD   escitalopram (LEXAPRO) 20 MG tablet Take 20 mg by mouth daily. Yes Historical Provider, MD   famotidine (PEPCID) 20 MG tablet Take 20 mg by mouth 2 times daily. Yes Historical Provider, MD   aspirin 81 MG EC tablet Take 81 mg by mouth daily. Yes Historical Provider, MD   Iron (FEOSOL PO) Take 325 mg by mouth 2 times daily. Yes Historical Provider, MD   therapeutic multivitamin-minerals (THERAGRAN-M) tablet Take 1 tablet by mouth daily. Yes Historical Provider, MD   rosuvastatin (CRESTOR) 20 MG tablet Take 20 mg by mouth daily. Yes Historical Provider, MD   albuterol (PROVENTIL) (2.5 MG/3ML) 0.083% nebulizer solution Take 2.5 mg by nebulization every 6 hours as needed. Yes Historical Provider, MD   fluticasone-salmeterol (ADVAIR DISKUS) 500-50 MCG/DOSE diskus inhaler Inhale 1 puff into the lungs 2 times daily. Yes Historical Provider, MD   tiotropium (SPIRIVA) 18 MCG inhalation capsule Inhale 18 mcg into the lungs daily.      Yes Historical Provider, MD   levalbuterol Past Medical History:        Diagnosis Date    Abdominal pain, chronic, generalized     Cervicodynia     Chronic back pain     Chronic kidney disease     secondary to renal vascular disease    Cigarette smoker     COPD (chronic obstructive pulmonary disease) (HCC)     DDD (degenerative disc disease), lumbar     Depression     Family history of cancer     Hernia, ventral     Hyperlipidemia     Iron deficiency anemia     Lichen simplex chronicus     PUD (peptic ulcer disease)     Renal cyst     Type II or unspecified type diabetes mellitus without mention of complication, not stated as uncontrolled     Vitamin D deficiency        Past Surgical History:        Procedure Laterality Date    ABDOMINAL AORTIC ANEURYSM REPAIR         Social History:    Social History   Substance Use Topics    Smoking status: Current Every Day Smoker     Packs/day: 2.00     Types: Cigarettes    Smokeless tobacco: Not on file    Alcohol use No                                Ready to quit: Not Answered  Counseling given: Not Answered      Vital Signs (Current):   Vitals:    02/18/18 1127 02/18/18 1350 02/18/18 1511 02/18/18 1650   BP:  (!) 102/55  110/64   Pulse:  75  78   Resp: 18 17 18    Temp:  98 °F (36.7 °C)     TempSrc:  Oral     SpO2: 98% 97% 97%    Weight:       Height:                                                  BP Readings from Last 3 Encounters:   02/18/18 110/64   02/04/16 154/84   11/23/15 148/85       NPO Status:                                                                                 BMI:   Wt Readings from Last 3 Encounters:   02/18/18 120 lb 2.4 oz (54.5 kg)   02/04/16 133 lb (60.3 kg)   11/23/15 134 lb 3.2 oz (60.9 kg)     Body mass index is 18.27 kg/m².     CBC:   Lab Results   Component Value Date    WBC 8.1 02/18/2018    RBC 2.43 02/18/2018    HGB 7.8 02/18/2018    HCT 24.3 02/18/2018    MCV 99.9 02/18/2018    RDW 18.5 02/18/2018     02/18/2018       CMP:   Lab Results Component Value Date     02/18/2018    K 4.6 02/18/2018    CL 96 02/18/2018    CO2 29 02/18/2018    BUN 54 02/18/2018    CREATININE 2.86 02/18/2018    GFRAA 27 02/18/2018    LABGLOM 22 02/18/2018    GLUCOSE 126 02/18/2018    GLUCOSE 86 03/27/2012    PROT 5.1 02/14/2018    CALCIUM 8.5 02/18/2018    BILITOT <0.15 02/06/2018    ALKPHOS 57 02/06/2018    AST 13 02/06/2018    ALT 14 02/06/2018       POC Tests:   Recent Labs      02/18/18   0743   POCGLU  80       Coags: No results found for: PROTIME, INR, APTT    HCG (If Applicable): No results found for: PREGTESTUR, PREGSERUM, HCG, HCGQUANT     ABGs:   Lab Results   Component Value Date    PHART 7.310 02/13/2018    PO2ART 65.8 02/13/2018    XYK8IOR 56.6 02/13/2018    YLK3WKJ 28.5 02/13/2018    Z9FDLADC 91.8 02/13/2018        Type & Screen (If Applicable):  No results found for: LABABO, [address]    Anesthesia Evaluation  Patient summary reviewed and Nursing notes reviewed no history of anesthetic complications:   Airway: Mallampati: II  TM distance: >3 FB   Neck ROM: full  Mouth opening: > = 3 FB Dental:    (+) edentulous      Pulmonary:   (+) COPD: no interval change,  decreased breath sounds,  current smoker                           Cardiovascular:  Exercise tolerance: poor (<4 METS),   (+) CHF: no interval change,       ECG reviewed  Rhythm: regular    Echocardiogram reviewed                  Neuro/Psych:   (+) psychiatric history:depression/anxiety             GI/Hepatic/Renal:   (+) PUD, renal disease: CRI,           Endo/Other:    (+) Type II DM, poorly controlled, , .                 Abdominal:           Vascular:                                      Anesthesia Plan      general and MAC     ASA 4     (LVEF 20-25%)  Induction: intravenous. MIPS: Postoperative opioids intended and Prophylactic antiemetics administered. Anesthetic plan and risks discussed with patient.                       Brenda Buckley MD   2/18/2018

## 2018-02-18 NOTE — PROGRESS NOTES
ABDOMINAL AORTIC ANEURYSM REPAIR         Restrictions  Restrictions/Precautions  Restrictions/Precautions: Fall Risk  Subjective   General  Family / Caregiver Present: Yes (Wife, Pat Ellis, present)  Subjective  Subjective: Patient agrees to PT. Supine in bed.  wife present  General Comment  Comments: Darya Cervantes, RN:  PT appropriate  Pain Screening  Patient Currently in Pain: Yes  Pain Assessment  Pain Assessment: 0-10  Pain Level: 7  Pain Type: Chronic pain  Pain Location: Buttocks;Leg  Pain Orientation: Right;Left  Pain Descriptors: Aching;Cramping  Pain Frequency: Continuous  Pain Onset: On-going  Patient's Stated Pain Goal: No pain  Pain Intervention(s): Medication (see eMar);Repositioned;RN notified  Response to Pain Intervention: Patient Satisfied  Vital Signs  Patient Currently in Pain: Yes       Orientation  Orientation  Overall Orientation Status: Within Normal Limits  Objective   Bed mobility  Rolling to Left: Contact guard assistance  Rolling to Right: Contact guard assistance  Supine to Sit: Contact guard assistance  Scooting: Moderate assistance  Comment: Boosted up  in bed as feet were pressing against foot of bed  Transfers  Comment: Patient asked not to stand due to plantar foot pain           Other exercises  Other exercises 1: Supine therex bilateral LE 15 reps each                        Assessment   Body structures, Functions, Activity limitations: Decreased functional mobility ; Decreased strength;Decreased endurance  Assessment: Impaired mobility due to deconditioning/decreased tolerance to activity  REQUIRES PT FOLLOW UP: Yes  Activity Tolerance  Activity Tolerance: Patient limited by pain  Activity Tolerance: Bilateral LE pain, plantar surface of feet also hurt       Discharge Recommendations:  Subacute/Skilled Nursing Facility    G-Code     OutComes Score                                                    AM-PAC Score             Goals  Short term goals  Time Frame for Short term goals: 7-10

## 2018-02-19 ENCOUNTER — ANESTHESIA (OUTPATIENT)
Dept: OPERATING ROOM | Age: 68
DRG: 673 | End: 2018-02-19
Payer: COMMERCIAL

## 2018-02-19 ENCOUNTER — APPOINTMENT (OUTPATIENT)
Dept: GENERAL RADIOLOGY | Age: 68
DRG: 673 | End: 2018-02-19
Payer: COMMERCIAL

## 2018-02-19 ENCOUNTER — CARE COORDINATOR VISIT (OUTPATIENT)
Dept: CASE MANAGEMENT | Age: 68
End: 2018-02-19

## 2018-02-19 VITALS — DIASTOLIC BLOOD PRESSURE: 55 MMHG | TEMPERATURE: 96.8 F | SYSTOLIC BLOOD PRESSURE: 111 MMHG | OXYGEN SATURATION: 97 %

## 2018-02-19 LAB
ALBUMIN (CALCULATED): 2.6 G/DL (ref 3.2–5.2)
ALBUMIN PERCENT: 50 % (ref 45–65)
ALPHA 1 PERCENT: 6 % (ref 3–6)
ALPHA 2 PERCENT: 16 % (ref 6–13)
ALPHA-1-GLOBULIN: 0.3 G/DL (ref 0.1–0.4)
ALPHA-2-GLOBULIN: 0.8 G/DL (ref 0.5–0.9)
ANION GAP SERPL CALCULATED.3IONS-SCNC: 11 MMOL/L (ref 9–17)
BETA GLOBULIN: 0.6 G/DL (ref 0.5–1.1)
BETA PERCENT: 12 % (ref 11–19)
BUN BLDV-MCNC: 63 MG/DL (ref 8–23)
BUN/CREAT BLD: ABNORMAL (ref 9–20)
CALCIUM SERPL-MCNC: 8.2 MG/DL (ref 8.6–10.4)
CHLORIDE BLD-SCNC: 95 MMOL/L (ref 98–107)
CO2: 26 MMOL/L (ref 20–31)
CREAT SERPL-MCNC: 3.36 MG/DL (ref 0.7–1.2)
ESTIMATED AVERAGE GLUCOSE: 97 MG/DL
GAMMA GLOBULIN %: 15 % (ref 9–20)
GAMMA GLOBULIN: 0.8 G/DL (ref 0.5–1.5)
GFR AFRICAN AMERICAN: 22 ML/MIN
GFR NON-AFRICAN AMERICAN: 18 ML/MIN
GFR SERPL CREATININE-BSD FRML MDRD: ABNORMAL ML/MIN/{1.73_M2}
GFR SERPL CREATININE-BSD FRML MDRD: ABNORMAL ML/MIN/{1.73_M2}
GLUCOSE BLD-MCNC: 117 MG/DL (ref 70–99)
GLUCOSE BLD-MCNC: 209 MG/DL (ref 75–110)
GLUCOSE BLD-MCNC: 78 MG/DL (ref 75–110)
GLUCOSE BLD-MCNC: 85 MG/DL (ref 75–110)
GLUCOSE BLD-MCNC: 86 MG/DL (ref 75–110)
GLUCOSE BLD-MCNC: 92 MG/DL (ref 75–110)
HBA1C MFR BLD: 5 % (ref 4–6)
HCT VFR BLD CALC: 23 % (ref 41–53)
HEMOGLOBIN: 7.4 G/DL (ref 13.5–17.5)
MCH RBC QN AUTO: 32.4 PG (ref 26–34)
MCHC RBC AUTO-ENTMCNC: 32.4 G/DL (ref 31–37)
MCV RBC AUTO: 100 FL (ref 80–100)
NRBC AUTOMATED: ABNORMAL PER 100 WBC
PATHOLOGIST: ABNORMAL
PATHOLOGIST: NORMAL
PDW BLD-RTO: 18.3 % (ref 11.5–14.9)
PLATELET # BLD: 108 K/UL (ref 150–450)
PMV BLD AUTO: 7.4 FL (ref 6–12)
POTASSIUM SERPL-SCNC: 4.9 MMOL/L (ref 3.7–5.3)
PROTEIN ELECTROPHORESIS, SERUM: ABNORMAL
RBC # BLD: 2.3 M/UL (ref 4.5–5.9)
SERUM IFX INTERP: NORMAL
SODIUM BLD-SCNC: 132 MMOL/L (ref 135–144)
TOTAL PROT. SUM,%: 99 % (ref 98–102)
TOTAL PROT. SUM: 5.1 G/DL (ref 6.3–8.2)
TOTAL PROTEIN: 5.1 G/DL (ref 6.4–8.3)
WBC # BLD: 6.3 K/UL (ref 3.5–11)

## 2018-02-19 PROCEDURE — 02HV33Z INSERTION OF INFUSION DEVICE INTO SUPERIOR VENA CAVA, PERCUTANEOUS APPROACH: ICD-10-PCS | Performed by: FAMILY MEDICINE

## 2018-02-19 PROCEDURE — 2580000003 HC RX 258: Performed by: NURSE ANESTHETIST, CERTIFIED REGISTERED

## 2018-02-19 PROCEDURE — 6360000002 HC RX W HCPCS: Performed by: NURSE ANESTHETIST, CERTIFIED REGISTERED

## 2018-02-19 PROCEDURE — 6360000002 HC RX W HCPCS: Performed by: INTERNAL MEDICINE

## 2018-02-19 PROCEDURE — 3600000002 HC SURGERY LEVEL 2 BASE: Performed by: SURGERY

## 2018-02-19 PROCEDURE — 36415 COLL VENOUS BLD VENIPUNCTURE: CPT

## 2018-02-19 PROCEDURE — 6360000002 HC RX W HCPCS: Performed by: SURGERY

## 2018-02-19 PROCEDURE — 2500000003 HC RX 250 WO HCPCS: Performed by: SURGERY

## 2018-02-19 PROCEDURE — 2580000003 HC RX 258: Performed by: ANESTHESIOLOGY

## 2018-02-19 PROCEDURE — 6370000000 HC RX 637 (ALT 250 FOR IP): Performed by: NURSE PRACTITIONER

## 2018-02-19 PROCEDURE — 6370000000 HC RX 637 (ALT 250 FOR IP): Performed by: INTERNAL MEDICINE

## 2018-02-19 PROCEDURE — 3209999900 FLUORO FOR SURGICAL PROCEDURES

## 2018-02-19 PROCEDURE — 85027 COMPLETE CBC AUTOMATED: CPT

## 2018-02-19 PROCEDURE — A6402 STERILE GAUZE <= 16 SQ IN: HCPCS | Performed by: SURGERY

## 2018-02-19 PROCEDURE — 94762 N-INVAS EAR/PLS OXIMTRY CONT: CPT

## 2018-02-19 PROCEDURE — 7100000000 HC PACU RECOVERY - FIRST 15 MIN: Performed by: SURGERY

## 2018-02-19 PROCEDURE — 82947 ASSAY GLUCOSE BLOOD QUANT: CPT

## 2018-02-19 PROCEDURE — 2580000003 HC RX 258: Performed by: EMERGENCY MEDICINE

## 2018-02-19 PROCEDURE — 94660 CPAP INITIATION&MGMT: CPT

## 2018-02-19 PROCEDURE — 71045 X-RAY EXAM CHEST 1 VIEW: CPT

## 2018-02-19 PROCEDURE — 2060000000 HC ICU INTERMEDIATE R&B

## 2018-02-19 PROCEDURE — 3600000012 HC SURGERY LEVEL 2 ADDTL 15MIN: Performed by: SURGERY

## 2018-02-19 PROCEDURE — C1881 DIALYSIS ACCESS SYSTEM: HCPCS | Performed by: SURGERY

## 2018-02-19 PROCEDURE — 83036 HEMOGLOBIN GLYCOSYLATED A1C: CPT

## 2018-02-19 PROCEDURE — 6370000000 HC RX 637 (ALT 250 FOR IP): Performed by: STUDENT IN AN ORGANIZED HEALTH CARE EDUCATION/TRAINING PROGRAM

## 2018-02-19 PROCEDURE — 6370000000 HC RX 637 (ALT 250 FOR IP): Performed by: FAMILY MEDICINE

## 2018-02-19 PROCEDURE — 90937 HEMODIALYSIS REPEATED EVAL: CPT

## 2018-02-19 PROCEDURE — 99232 SBSQ HOSP IP/OBS MODERATE 35: CPT | Performed by: INTERNAL MEDICINE

## 2018-02-19 PROCEDURE — 80048 BASIC METABOLIC PNL TOTAL CA: CPT

## 2018-02-19 PROCEDURE — 3700000000 HC ANESTHESIA ATTENDED CARE: Performed by: SURGERY

## 2018-02-19 PROCEDURE — 7100000001 HC PACU RECOVERY - ADDTL 15 MIN: Performed by: SURGERY

## 2018-02-19 PROCEDURE — 02PY33Z REMOVAL OF INFUSION DEVICE FROM GREAT VESSEL, PERCUTANEOUS APPROACH: ICD-10-PCS | Performed by: FAMILY MEDICINE

## 2018-02-19 PROCEDURE — 2580000003 HC RX 258: Performed by: NURSE PRACTITIONER

## 2018-02-19 PROCEDURE — 6360000002 HC RX W HCPCS: Performed by: FAMILY MEDICINE

## 2018-02-19 PROCEDURE — 0JH63XZ INSERTION OF TUNNELED VASCULAR ACCESS DEVICE INTO CHEST SUBCUTANEOUS TISSUE AND FASCIA, PERCUTANEOUS APPROACH: ICD-10-PCS | Performed by: FAMILY MEDICINE

## 2018-02-19 PROCEDURE — 94640 AIRWAY INHALATION TREATMENT: CPT

## 2018-02-19 PROCEDURE — 2500000003 HC RX 250 WO HCPCS: Performed by: NURSE ANESTHETIST, CERTIFIED REGISTERED

## 2018-02-19 PROCEDURE — 3700000001 HC ADD 15 MINUTES (ANESTHESIA): Performed by: SURGERY

## 2018-02-19 RX ORDER — DIAZEPAM 5 MG/1
5 TABLET ORAL EVERY 12 HOURS PRN
Status: DISCONTINUED | OUTPATIENT
Start: 2018-02-19 | End: 2018-02-27 | Stop reason: HOSPADM

## 2018-02-19 RX ORDER — HEPARIN SODIUM 5000 [USP'U]/ML
INJECTION, SOLUTION INTRAVENOUS; SUBCUTANEOUS PRN
Status: DISCONTINUED | OUTPATIENT
Start: 2018-02-19 | End: 2018-02-19 | Stop reason: HOSPADM

## 2018-02-19 RX ORDER — PROMETHAZINE HYDROCHLORIDE 25 MG/ML
6.25 INJECTION, SOLUTION INTRAMUSCULAR; INTRAVENOUS
Status: DISCONTINUED | OUTPATIENT
Start: 2018-02-19 | End: 2018-02-19 | Stop reason: HOSPADM

## 2018-02-19 RX ORDER — FENTANYL CITRATE 50 UG/ML
INJECTION, SOLUTION INTRAMUSCULAR; INTRAVENOUS PRN
Status: DISCONTINUED | OUTPATIENT
Start: 2018-02-19 | End: 2018-02-19 | Stop reason: SDUPTHER

## 2018-02-19 RX ORDER — HEPARIN SODIUM 1000 [USP'U]/ML
1800 INJECTION, SOLUTION INTRAVENOUS; SUBCUTANEOUS PRN
Status: DISCONTINUED | OUTPATIENT
Start: 2018-02-19 | End: 2018-02-27 | Stop reason: HOSPADM

## 2018-02-19 RX ORDER — HEPARIN SODIUM 1000 [USP'U]/ML
1700 INJECTION, SOLUTION INTRAVENOUS; SUBCUTANEOUS PRN
Status: DISCONTINUED | OUTPATIENT
Start: 2018-02-19 | End: 2018-02-27 | Stop reason: HOSPADM

## 2018-02-19 RX ORDER — SODIUM CHLORIDE 9 MG/ML
INJECTION, SOLUTION INTRAVENOUS CONTINUOUS PRN
Status: DISCONTINUED | OUTPATIENT
Start: 2018-02-19 | End: 2018-02-19 | Stop reason: SDUPTHER

## 2018-02-19 RX ORDER — FENTANYL CITRATE 50 UG/ML
50 INJECTION, SOLUTION INTRAMUSCULAR; INTRAVENOUS EVERY 5 MIN PRN
Status: DISCONTINUED | OUTPATIENT
Start: 2018-02-19 | End: 2018-02-19 | Stop reason: HOSPADM

## 2018-02-19 RX ORDER — FENTANYL CITRATE 50 UG/ML
25 INJECTION, SOLUTION INTRAMUSCULAR; INTRAVENOUS EVERY 5 MIN PRN
Status: DISCONTINUED | OUTPATIENT
Start: 2018-02-19 | End: 2018-02-19 | Stop reason: HOSPADM

## 2018-02-19 RX ORDER — PREDNISONE 20 MG/1
20 TABLET ORAL DAILY
Status: DISCONTINUED | OUTPATIENT
Start: 2018-02-20 | End: 2018-02-20

## 2018-02-19 RX ORDER — MIDAZOLAM HYDROCHLORIDE 1 MG/ML
INJECTION INTRAMUSCULAR; INTRAVENOUS PRN
Status: DISCONTINUED | OUTPATIENT
Start: 2018-02-19 | End: 2018-02-19 | Stop reason: SDUPTHER

## 2018-02-19 RX ORDER — DIPHENHYDRAMINE HYDROCHLORIDE 50 MG/ML
12.5 INJECTION INTRAMUSCULAR; INTRAVENOUS
Status: DISCONTINUED | OUTPATIENT
Start: 2018-02-19 | End: 2018-02-19 | Stop reason: HOSPADM

## 2018-02-19 RX ORDER — ONDANSETRON 2 MG/ML
INJECTION INTRAMUSCULAR; INTRAVENOUS PRN
Status: DISCONTINUED | OUTPATIENT
Start: 2018-02-19 | End: 2018-02-19 | Stop reason: SDUPTHER

## 2018-02-19 RX ORDER — SODIUM CHLORIDE 9 MG/ML
INJECTION, SOLUTION INTRAVENOUS CONTINUOUS
Status: DISCONTINUED | OUTPATIENT
Start: 2018-02-19 | End: 2018-02-19

## 2018-02-19 RX ORDER — KETAMINE HYDROCHLORIDE 50 MG/ML
INJECTION, SOLUTION, CONCENTRATE INTRAMUSCULAR; INTRAVENOUS PRN
Status: DISCONTINUED | OUTPATIENT
Start: 2018-02-19 | End: 2018-02-19 | Stop reason: SDUPTHER

## 2018-02-19 RX ORDER — PROPOFOL 10 MG/ML
INJECTION, EMULSION INTRAVENOUS PRN
Status: DISCONTINUED | OUTPATIENT
Start: 2018-02-19 | End: 2018-02-19 | Stop reason: SDUPTHER

## 2018-02-19 RX ORDER — LABETALOL HYDROCHLORIDE 5 MG/ML
5 INJECTION, SOLUTION INTRAVENOUS EVERY 10 MIN PRN
Status: DISCONTINUED | OUTPATIENT
Start: 2018-02-19 | End: 2018-02-19 | Stop reason: HOSPADM

## 2018-02-19 RX ORDER — LIDOCAINE HYDROCHLORIDE 10 MG/ML
INJECTION, SOLUTION EPIDURAL; INFILTRATION; INTRACAUDAL; PERINEURAL PRN
Status: DISCONTINUED | OUTPATIENT
Start: 2018-02-19 | End: 2018-02-19 | Stop reason: HOSPADM

## 2018-02-19 RX ORDER — HEPARIN SODIUM 5000 [USP'U]/ML
5000 INJECTION, SOLUTION INTRAVENOUS; SUBCUTANEOUS EVERY 12 HOURS SCHEDULED
Status: DISCONTINUED | OUTPATIENT
Start: 2018-02-20 | End: 2018-02-27 | Stop reason: HOSPADM

## 2018-02-19 RX ADMIN — FENTANYL CITRATE 25 MCG: 50 INJECTION, SOLUTION INTRAMUSCULAR; INTRAVENOUS at 07:59

## 2018-02-19 RX ADMIN — HEPARIN SODIUM 1800 UNITS: 1000 INJECTION, SOLUTION INTRAVENOUS; SUBCUTANEOUS at 14:02

## 2018-02-19 RX ADMIN — PROPOFOL 10 MG: 10 INJECTION, EMULSION INTRAVENOUS at 08:08

## 2018-02-19 RX ADMIN — SODIUM CHLORIDE: 9 INJECTION, SOLUTION INTRAVENOUS at 07:46

## 2018-02-19 RX ADMIN — LINEZOLID 600 MG: 600 INJECTION, SOLUTION INTRAVENOUS at 00:11

## 2018-02-19 RX ADMIN — KETAMINE HYDROCHLORIDE 10 MG: 50 INJECTION, SOLUTION INTRAMUSCULAR; INTRAVENOUS at 08:00

## 2018-02-19 RX ADMIN — OXYCODONE HYDROCHLORIDE AND ACETAMINOPHEN 1 TABLET: 5; 325 TABLET ORAL at 01:30

## 2018-02-19 RX ADMIN — FENTANYL CITRATE 25 MCG: 50 INJECTION, SOLUTION INTRAMUSCULAR; INTRAVENOUS at 08:07

## 2018-02-19 RX ADMIN — OXYCODONE HYDROCHLORIDE 5 MG: 5 TABLET ORAL at 01:30

## 2018-02-19 RX ADMIN — OXYCODONE HYDROCHLORIDE 5 MG: 5 TABLET ORAL at 05:48

## 2018-02-19 RX ADMIN — FENTANYL CITRATE 25 MCG: 50 INJECTION INTRAMUSCULAR; INTRAVENOUS at 00:11

## 2018-02-19 RX ADMIN — HEPARIN SODIUM 1700 UNITS: 1000 INJECTION, SOLUTION INTRAVENOUS; SUBCUTANEOUS at 14:02

## 2018-02-19 RX ADMIN — Medication 10 ML: at 21:40

## 2018-02-19 RX ADMIN — MIDAZOLAM 0.5 MG: 1 INJECTION INTRAMUSCULAR; INTRAVENOUS at 08:08

## 2018-02-19 RX ADMIN — OXYCODONE HYDROCHLORIDE AND ACETAMINOPHEN 1 TABLET: 5; 325 TABLET ORAL at 17:54

## 2018-02-19 RX ADMIN — OXYCODONE HYDROCHLORIDE 5 MG: 5 TABLET ORAL at 11:11

## 2018-02-19 RX ADMIN — OXYCODONE HYDROCHLORIDE AND ACETAMINOPHEN 1 TABLET: 5; 325 TABLET ORAL at 05:48

## 2018-02-19 RX ADMIN — IPRATROPIUM BROMIDE AND ALBUTEROL SULFATE 1 AMPULE: .5; 3 SOLUTION RESPIRATORY (INHALATION) at 14:52

## 2018-02-19 RX ADMIN — OXYCODONE HYDROCHLORIDE 5 MG: 5 TABLET ORAL at 17:54

## 2018-02-19 RX ADMIN — HYDRALAZINE HYDROCHLORIDE 50 MG: 50 TABLET, FILM COATED ORAL at 21:39

## 2018-02-19 RX ADMIN — ONDANSETRON 4 MG: 2 INJECTION INTRAMUSCULAR; INTRAVENOUS at 08:34

## 2018-02-19 RX ADMIN — PROPOFOL 10 MG: 10 INJECTION, EMULSION INTRAVENOUS at 08:10

## 2018-02-19 RX ADMIN — Medication 10 ML: at 21:39

## 2018-02-19 RX ADMIN — MIDAZOLAM 0.5 MG: 1 INJECTION INTRAMUSCULAR; INTRAVENOUS at 07:59

## 2018-02-19 RX ADMIN — OXYCODONE HYDROCHLORIDE 5 MG: 5 TABLET ORAL at 21:39

## 2018-02-19 RX ADMIN — KETAMINE HYDROCHLORIDE 10 MG: 50 INJECTION, SOLUTION INTRAMUSCULAR; INTRAVENOUS at 08:08

## 2018-02-19 RX ADMIN — IPRATROPIUM BROMIDE AND ALBUTEROL SULFATE 1 AMPULE: .5; 3 SOLUTION RESPIRATORY (INHALATION) at 19:35

## 2018-02-19 RX ADMIN — LINEZOLID 600 MG: 600 INJECTION, SOLUTION INTRAVENOUS at 23:05

## 2018-02-19 RX ADMIN — OXYCODONE HYDROCHLORIDE AND ACETAMINOPHEN 1 TABLET: 5; 325 TABLET ORAL at 21:39

## 2018-02-19 RX ADMIN — SODIUM CHLORIDE: 9 INJECTION, SOLUTION INTRAVENOUS at 07:44

## 2018-02-19 RX ADMIN — CARVEDILOL 6.25 MG: 6.25 TABLET, FILM COATED ORAL at 17:54

## 2018-02-19 RX ADMIN — HYDRALAZINE HYDROCHLORIDE 50 MG: 50 TABLET, FILM COATED ORAL at 05:48

## 2018-02-19 RX ADMIN — OXYCODONE HYDROCHLORIDE AND ACETAMINOPHEN 1 TABLET: 5; 325 TABLET ORAL at 11:11

## 2018-02-19 RX ADMIN — Medication 2 PUFF: at 21:38

## 2018-02-19 ASSESSMENT — PULMONARY FUNCTION TESTS
PIF_VALUE: 0
PIF_VALUE: 1
PIF_VALUE: 0
PIF_VALUE: 1
PIF_VALUE: 0
PIF_VALUE: 1
PIF_VALUE: 0
PIF_VALUE: 1
PIF_VALUE: 0
PIF_VALUE: 1
PIF_VALUE: 0
PIF_VALUE: 4
PIF_VALUE: 1
PIF_VALUE: 0
PIF_VALUE: 1
PIF_VALUE: 0
PIF_VALUE: 0
PIF_VALUE: 1
PIF_VALUE: 0

## 2018-02-19 ASSESSMENT — PAIN SCALES - GENERAL
PAINLEVEL_OUTOF10: 0
PAINLEVEL_OUTOF10: 0
PAINLEVEL_OUTOF10: 2
PAINLEVEL_OUTOF10: 0
PAINLEVEL_OUTOF10: 4
PAINLEVEL_OUTOF10: 5
PAINLEVEL_OUTOF10: 5
PAINLEVEL_OUTOF10: 4
PAINLEVEL_OUTOF10: 7
PAINLEVEL_OUTOF10: 6
PAINLEVEL_OUTOF10: 7
PAINLEVEL_OUTOF10: 0
PAINLEVEL_OUTOF10: 6
PAINLEVEL_OUTOF10: 7

## 2018-02-19 ASSESSMENT — ENCOUNTER SYMPTOMS
VOMITING: 0
SHORTNESS OF BREATH: 0
ABDOMINAL PAIN: 0
BLURRED VISION: 0
NAUSEA: 0
DOUBLE VISION: 0
HEARTBURN: 0
COUGH: 0
HEMOPTYSIS: 0

## 2018-02-19 ASSESSMENT — PAIN DESCRIPTION - LOCATION
LOCATION: BACK
LOCATION: CHEST;NECK

## 2018-02-19 ASSESSMENT — PAIN DESCRIPTION - PAIN TYPE: TYPE: CHRONIC PAIN

## 2018-02-19 ASSESSMENT — PAIN DESCRIPTION - ORIENTATION: ORIENTATION: RIGHT

## 2018-02-19 ASSESSMENT — PAIN DESCRIPTION - DESCRIPTORS: DESCRIPTORS: DISCOMFORT

## 2018-02-19 NOTE — CARE COORDINATION
[address] Transitions Interview     2018    Patient: Cande Sousa Patient : 1950   MRN: [id number]  Reason for Admission: There are no discharge diagnoses documented for the most recent discharge. RARS: [place]  19       Patient in dialysis when writer visited, will follow//JU      Readmission Risk  Patient Active Problem List   Diagnosis    Type 2 diabetes mellitus without complication, with long-term current use of insulin ([facility]    Hyperlipidemia    COPD (chronic obstructive pulmonary disease) (HCC)    Iron deficiency anemia due to chronic blood loss    Cigarette smoker    Abdominal pain, chronic, generalized    Hernia, ventral    DDD (degenerative disc disease), lumbar    Cervicodynia    PUD (peptic ulcer disease)    Chronic kidney disease    Depression    Vitamin D deficiency    Lichen simplex chronicus    Hyperkalemia    Family history of cancer    COPD exacerbation ([facility]    Abdominal aortic aneurysm (AAA) ([facility]    Acute on chronic respiratory failure with hypercapnia (HCC)    Acute on chronic systolic heart failure (HCC)    Drug-induced hyperglycemia    Anemia    Gastrointestinal hemorrhage    Abdominal wall hernia    Absolute anemia    Thrombosis of left femoral-femoral bypass graft [facility])       Inpatient Assessment  Care Transitions Summary    Care Transitions Inpatient Review  Medication Review  Housing Review  Who do you live with?:  Partner/Spouse/SO  Social Support  Durable Medical Equipment  Patient DME:  Wheelchair, Other  Other Patient DME:  ramp  Patient Home Equipment:  Oxygen, Nebulizer  Functional Review  Ability to seek help/take action for Emergent/Urgent situations i.e. fire, crime, inclement weather or health crisis. :  Independent  Ability handle personal hygiene needs (bathing/dressing/grooming): Independent  Ability to manage medications:   Independent  Hearing and Vision  Care Transitions Interventions         Follow Up  No future

## 2018-02-19 NOTE — PROGRESS NOTES
Occupational Therapy    Date: 2018  Patient Name: Madisyn Friend        : 1950       [] Pt Refusal           [x] Pt Unavailable due to:     Pt having hemodialysis catheter insertion       TRACY Farmer/CARLOS Date: 2018

## 2018-02-19 NOTE — PROGRESS NOTES
Dialysis Post Treatment Report:     -Access Assessment  WNL     -Ultrafiltration   UF Goal 2800   Prime (-) 400   NS Flush (-)    Other (-/+)    Total UF Removed 2400     -Medications / Blood Administration  Medications Given (Y/N) n   Blood Products (Y/N) n     Narrative:  2.4KG removed, vitals stable, patient tolerated treatment without complaints, report given.

## 2018-02-19 NOTE — OP NOTE
it was fed over the guidewire in a Seldinger technique into the internal jugular and into the atrial caval junction verified by fluoroscopy. At this point the dilator core and the guidewire were removed and the hemodialysis catheter was fed through the peel-away sheath into the atrial caval junction and the sheath was removed. The position of the catheter was verified with fluoroscopy and the catheter was making a nice turn without any kinking. Both ports were tested and were noted to aspirate and flush without any difficulty or resistance. Both ports were flushed with saline followed by heparin. The catheter was secured to the skin using 2-0 nylon sutures and the small nick incision in the neck was closed with 3-0 Vicryl subcuticular. Incisions were washed and dried sterilely. Mastisol and Steri-Strips were applied and pressure occlusive dressing dressings were applied. Patient tolerated procedure well, was awakened from anesthesia and taken to recovery in stable condition. Instruments, needles and sponges were counted twice at the end of the procedure and count was correct. Postprocedure chest x-ray showed the catheter in good position with no evidence of pneumothorax. All fluoroscopic images were interrupted by myself intraoperatively.

## 2018-02-19 NOTE — ANESTHESIA POSTPROCEDURE EVALUATION
Department of Anesthesiology  Postprocedure Note    Patient: Maureen Batista  MRN: [id number]  YOB: 1950  Date of evaluation: 2/19/2018  Time:  9:26 AM     Procedure Summary     Date:  02/19/18 Room / Location:  [address] / [address]    Anesthesia Start:  3096 Anesthesia Stop:  7662    Procedure:  CATHETER INSERTION HEMODIALYSIS (Right ) Diagnosis:  (ACUTE RENAL FAILURE)    Surgeon:  Hanna Nyhan, DO Responsible Provider:  Marilee Masters MD    Anesthesia Type:  MAC ASA Status:  4          Anesthesia Type: MAC    Carolann Phase I: Carolann Score: 8    Carolann Phase II:      Last vitals: Reviewed and per EMR flowsheets.        Anesthesia Post Evaluation    Comments: POST- ANESTHESIA EVALUATION       Pt Name: Maureen Batista  MRN: [id number]  [place]: 1950  Date of evaluation: 2/19/2018  Time:  9:26 AM      /67   Pulse 72   Temp 98.4 °F (36.9 °C)   Resp 10   Ht 5' 8\" (1.727 m)   Wt 120 lb 2.4 oz (54.5 kg)   SpO2 93%   BMI 18.27 kg/m²      Consciousness Level  Awake  Cardiopulmonary Status  Stable  Pain Adequately Treated YES  Nausea / Vomiting  NO  Adequate Hydration  YES  Anesthesia Related Complications NONE      Electronically signed by Marilee Masters MD on 2/19/2018 at 9:26 AM

## 2018-02-19 NOTE — PROGRESS NOTES
Dialysis Safety Checks:    Patient ID Verified (Y/N) y     -Hepatitis Test                   Date      Result  Hepatitis B Surface Antigen   18 NEG. Hepatitis B Surface Antibody 18 NEG. Hepatitis B Core Antibody        -Treatment Initiation  Blood Vol Processed Goal (Liters)  Pump Speed x Treatment Hours x 60 Minutes 84.0   Target Fluid Removal 2500ml     * Intra-treatment documented Safety Checks include the followin) Access and face visible at all times. 2) All connections and blood lines are secure with no kinks. 3) NVL alarm engaged. 4) Hemosafe device applied (if applicable). 5) No collapse of Arterial or Venous blood chambers. 6) All blood lines / pump segments in the air detectors.   --------------------------------------------------------------------------------

## 2018-02-19 NOTE — PROGRESS NOTES
[address] [address]    Progress Note    2/19/2018    2:41 PM    Name:   Tata Cordova  MRN:     [id number]     [place]:      [de-identified]   Room:   2098/2098-01   Day:  15  Admit Date:  2/5/2018  7:29 PM    PCP:   Amrita Almonte MD  Code Status:  DNR-CCA    Subjective:     C/C:   Chief Complaint   Patient presents with    Shortness of Breath     Interval History Status: improved. Patient went for tunneled cath placement this morning with general surgery. When seen this afternoon, patient was receiving dialysis, and was resting comfortably. Still complaining of foot pain despite the lidocaine patches and systemic opiates. Brief History:     The patient is a 75 y.o. Non-/non  male wit hPMHx of COPD on 3 LPM, Systolic CHF, and CKD baseline Cr of 2.90 who presents with Shortness of Breath. Patient was at PCP on 2/1 for similar complaint, and was given Levaquin and medrol dose pack PO. At the time, he had a productive cough, and chest pain with coughing. He had no improvement in his Sx, and came to ED for revaluation. In the ED, his O2 sat was at 85% on 3 LPM. He was given 4 LPM by Venturi mask. Pulmonology was consulted. CXR showed small B/L Pleural effusions and R sided hilar density. His Cr was also elevated at 3.70, so nephrology was consulted. Hgb was 7.0 in ED and he was given 1 U PRBC. Pt denied SANJAY, but reports possible bloody stools. He was given IV solumedrol bolus, and admitted to ICU intermediate   for the management of  Acute on Chronic Respiratory Failure     Review of Systems:     Review of Systems   Constitutional: Negative for chills and fever. Eyes: Negative for blurred vision and double vision. Respiratory: Negative for cough, hemoptysis and shortness of breath. Cardiovascular: Negative for chest pain, claudication and leg swelling.    Gastrointestinal: Negative for abdominal pain, heartburn, nausea and separate procedure report for more information. Xr Chest Standard (2 Vw)    Result Date: 2/5/2018  EXAMINATION: TWO VIEWS OF THE CHEST 2/5/2018 8:56 pm COMPARISON: November 24, 2015 HISTORY: ORDERING SYSTEM PROVIDED HISTORY: shortness of breath TECHNOLOGIST PROVIDED HISTORY: Reason for exam:->shortness of breath Ordering Physician Provided Reason for Exam: shortness of breath Acuity: Acute Type of Exam: Initial FINDINGS: Right hilar fullness has increased. Small pleural effusions are suspected. Chronic interstitial lung changes are again seen. Cardiomegaly. No pulmonary edema. Diffuse osteopenia with thoracic spine degenerative changes. Small pleural effusions. Increased right hilar density may reflect underlying adenopathy. Follow-up with chest CT may be helpful. Xr Femur Right (min 2 Views)    Result Date: 2/12/2018  EXAMINATION: 2 VIEWS OF THE RIGHT FEMUR 2/12/2018 9:41 am COMPARISON: None. HISTORY: ORDERING SYSTEM PROVIDED HISTORY: fall TECHNOLOGIST PROVIDED HISTORY: portable Reason for exam:->fall Ordering Physician Provided Reason for Exam: fall, right femur pain Acuity: Unknown Type of Exam: Unknown FINDINGS: Two views right femur obtained portably show no convincing evidence of acute fracture or dislocation. Mild degenerative changes of the hip. There are vascular calcifications. No radiopaque soft tissue foreign bodies. No localized soft tissue swelling is seen. Surgical clip in the pelvis. If the hip is the area of specific clinical concern dedicated views are recommended. No convincing evidence of acute fracture. Ct Chest Wo Contrast    Result Date: 2/6/2018  EXAMINATION: CT OF THE CHEST WITHOUT CONTRAST 2/6/2018 2:41 pm TECHNIQUE: CT of the chest was performed without the administration of intravenous contrast. Multiplanar reformatted images are provided for review.  Dose modulation, iterative reconstruction, and/or weight based adjustment of the mA/kV was utilized to reduce findings, as above. Xr Chest Portable    Result Date: 2/14/2018  EXAMINATION: SINGLE VIEW OF THE CHEST, 2/14/2018 6:33 am COMPARISON: 02/13/2018 HISTORY: ORDERING SYSTEM PROVIDED HISTORY: dyspnea TECHNOLOGIST PROVIDED HISTORY: Reason for exam:->dyspnea Ordering Physician Provided Reason for Exam: DYSPNEA Acuity: Unknown Type of Exam:  Unknown FINDINGS: Right IJ central venous catheter is stable in position. Cardiomediastinal silhouette and pulmonary vasculature are stable. Scattered pleural and parenchymal scarring in the lungs as well as pulmonary hyperinflation is unchanged. There is no new airspace consolidation. No sizable pneumothorax or pleural effusion. No free air beneath the diaphragm. No evidence of acute osseous abnormality. Stable appearance of the chest.  No acute findings. Xr Chest Portable    Result Date: 2/13/2018  EXAMINATION: SINGLE VIEW OF THE CHEST 2/13/2018 6:30 am COMPARISON: 02/12/2018 HISTORY: ORDERING SYSTEM PROVIDED HISTORY: worsening dyspnea; on Bipap TECHNOLOGIST PROVIDED HISTORY: Reason for exam:->worsening dyspnea; on Bipap Acuity: Unknown Type of Exam: Unknown Additional signs and symptoms: Increased dyspnea; pt on bipap. FINDINGS: Cardiac silhouette is borderline prominent. There are atherosclerotic calcifications of the aortic arch. Right-sided jugular venous catheter terminates overlying the expected location of the SVC, in grossly unchanged positioning. Lungs remain hyperinflated and there is generalized interstitial prominence. Trace bilateral pleural effusions versus pleuroparenchymal scarring noted. No obvious focal airspace consolidation or pneumothorax. Osseous structures and soft tissues are grossly stable. Right-sided jugular venous catheter remains in unchanged position. Chronic findings of COPD. No obvious focal airspace consolidation or pneumothorax.      Xr Chest Portable    Result Date: 2/12/2018  EXAMINATION: SINGLE VIEW OF THE CHEST apical pleural thickening. Emphysematous bulla in the right lung base. Bilateral prominent yinka consistent with enlargement of the central pulmonary arteries. Right suprahilar parenchymal consolidation extending into the right upper lobe, unchanged. Blunting of the costophrenic angle suggesting small effusions. No pneumothorax. No significant interval radiographic change. Vl Dup Lower Extremity Arteries Bilateral    Result Date: 2/15/2018    [facility]  Vascular Lower Extremities Arterial Duplex Procedure   [facility]  Date of Study           02/15/2018   Date of Birth 1950     Gender                  Male   Age           75 year(s)     Race                    Other   Room Number   2007           Height:                 67.72 inch, 172 cm   Corporate ID  [id number]     Weight:                 128 pounds, 58 kg  #   Patient Acct  [de-identified]  BSA:        1.69 m^2    BMI:      19.61  #                                                              kg/m^2   MR #          [id number]         Sonographer             Casper Amador   Accession #   [id number]      Interpreting Physician  Rusty Moura   Referring                    Referring Physician     Deana Ortiz  Nurse  Practitioner  Procedure Type of Study:   Extremities Arteries: Lower Extremities Arterial Duplex, Arterial Scan  Lower Bilateral.  Indications for Study:Absent pulses. Patient Status: In Patient. Technical Quality:Limited visualization. Limitation reason:Patient movement. Comments:Incidental finding: Patient appears to have a femoral to femoral bypass. Bypass appears to be totally occluded when imaged with B-Mode. Bypass has no detectable velocities.   Conclusions   Summary   Simultaneous real time imaging utilizing B-Mode, color doppler and  spectral waveform analysis was performed on bilateral lower extremities  for arterial examination: Study demonstrates:   ABNORMAL STUDY   Occluded femoral to femoral +------------------------------------+----------+---------------+----------+ ! GSV Thigh                           ! Yes       ! Yes            ! None      ! +------------------------------------+----------+---------------+----------+ ! GSV Knee                            ! Yes       ! Yes            ! None      ! +------------------------------------+----------+---------------+----------+ ! GSV Ankle                           ! Yes       ! Yes            ! None      ! +------------------------------------+----------+---------------+----------+ ! SSV                                 ! Yes       ! Yes            ! None      ! +------------------------------------+----------+---------------+----------+ Left Lower Extremities DVT Study Measurements Left 2D Measurements +------------------------------------+----------+---------------+----------+ ! Location                            ! Visualized! Compressibility! Thrombosis! +------------------------------------+----------+---------------+----------+ ! Common Femoral                      !Yes       ! Yes            ! None      ! +------------------------------------+----------+---------------+----------+ ! Prox Femoral                        !Yes       ! Yes            ! None      ! +------------------------------------+----------+---------------+----------+ ! Mid Femoral                         !Yes       ! Yes            ! None      ! +------------------------------------+----------+---------------+----------+ ! Dist Femoral                        !Yes       ! Yes            ! None      ! +------------------------------------+----------+---------------+----------+ ! Deep Femoral                        !Yes       ! Yes            ! None      ! +------------------------------------+----------+---------------+----------+ ! Popliteal                           !Yes       ! Yes            ! None      ! +------------------------------------+----------+---------------+----------+ ! Sapheno Femoral Junction !!Pressure! Ratio! PPG Wave Form   ! +---------++--------+-----+---------------++--------+-----+----------------+ ! Great Toe!!20      !0.14 !               !!21      !0.15 !                ! +---------++--------+-----+---------------++--------+-----+----------------+        Physical Examination:        Physical Exam   Constitutional: He is oriented to person, place, and time. Vital signs are normal. He appears malnourished. He appears unhealthy. HENT:   Head: Normocephalic and atraumatic. Eyes: EOM are normal.   Neck: Normal range of motion. Steristrips over R Upper Chest covering previous site of Luis Catheter   Cardiovascular: Normal rate, regular rhythm, normal heart sounds and intact distal pulses. Pulmonary/Chest: Effort normal.   Newly placed Tuneled catheter in R side of chest   Abdominal: Soft. Bowel sounds are normal. There is no tenderness. Neurological: He is alert and oriented to person, place, and time. GCS score is 15. Skin: Skin is warm and dry.          Assessment:        Primary Problem  Acute on chronic respiratory failure with hypercapnia [facility])    Active Hospital Problems    Diagnosis Date Noted    Thrombosis of left femoral-femoral bypass graft ([facility] [U77.740S]     Gastrointestinal hemorrhage [K92.2]     Abdominal wall hernia [K43.9]     Absolute anemia [D64.9]     Acute on chronic respiratory failure with hypercapnia (HCC) [J96.22] 02/08/2018    Acute on chronic systolic heart failure (HCC) [I50.23] 02/08/2018    Drug-induced hyperglycemia [R73.9, T50.905A] 02/08/2018    Anemia [D64.9]     Abdominal aortic aneurysm (AAA) ([facility] [I71.4] 02/06/2018    COPD exacerbation ([address] [J44.1] 02/05/2018    Type 2 diabetes mellitus without complication, with long-term current use of insulin (HCC) [E11.9, Z79.4]     Chronic kidney disease [N18.9]        Plan:        Placement to [facility]     Urinary Retention, diggs removed 2/19   - Tamsulosin 0.4 mg QD     Acute on Chronic Respiratory

## 2018-02-20 LAB
ANION GAP SERPL CALCULATED.3IONS-SCNC: 8 MMOL/L (ref 9–17)
BUN BLDV-MCNC: 33 MG/DL (ref 8–23)
BUN/CREAT BLD: ABNORMAL (ref 9–20)
CALCIUM SERPL-MCNC: 8.1 MG/DL (ref 8.6–10.4)
CHLORIDE BLD-SCNC: 99 MMOL/L (ref 98–107)
CO2: 27 MMOL/L (ref 20–31)
CREAT SERPL-MCNC: 2.37 MG/DL (ref 0.7–1.2)
GFR AFRICAN AMERICAN: 33 ML/MIN
GFR NON-AFRICAN AMERICAN: 27 ML/MIN
GFR SERPL CREATININE-BSD FRML MDRD: ABNORMAL ML/MIN/{1.73_M2}
GFR SERPL CREATININE-BSD FRML MDRD: ABNORMAL ML/MIN/{1.73_M2}
GLUCOSE BLD-MCNC: 150 MG/DL (ref 75–110)
GLUCOSE BLD-MCNC: 172 MG/DL (ref 75–110)
GLUCOSE BLD-MCNC: 175 MG/DL (ref 75–110)
GLUCOSE BLD-MCNC: 179 MG/DL (ref 75–110)
GLUCOSE BLD-MCNC: 60 MG/DL (ref 75–110)
GLUCOSE BLD-MCNC: 73 MG/DL (ref 70–99)
HCT VFR BLD CALC: 27.2 % (ref 41–53)
HEMOGLOBIN: 8.3 G/DL (ref 13.5–17.5)
MCH RBC QN AUTO: 31.9 PG (ref 26–34)
MCHC RBC AUTO-ENTMCNC: 30.4 G/DL (ref 31–37)
MCV RBC AUTO: 105.1 FL (ref 80–100)
NRBC AUTOMATED: ABNORMAL PER 100 WBC
PDW BLD-RTO: 19.1 % (ref 11.5–14.9)
PLATELET # BLD: 106 K/UL (ref 150–450)
PMV BLD AUTO: 7.4 FL (ref 6–12)
POTASSIUM SERPL-SCNC: 3.9 MMOL/L (ref 3.7–5.3)
RBC # BLD: 2.59 M/UL (ref 4.5–5.9)
SODIUM BLD-SCNC: 134 MMOL/L (ref 135–144)
WBC # BLD: 5.9 K/UL (ref 3.5–11)

## 2018-02-20 PROCEDURE — 6360000002 HC RX W HCPCS: Performed by: FAMILY MEDICINE

## 2018-02-20 PROCEDURE — 2580000003 HC RX 258: Performed by: EMERGENCY MEDICINE

## 2018-02-20 PROCEDURE — 6370000000 HC RX 637 (ALT 250 FOR IP): Performed by: INTERNAL MEDICINE

## 2018-02-20 PROCEDURE — 94762 N-INVAS EAR/PLS OXIMTRY CONT: CPT

## 2018-02-20 PROCEDURE — 51798 US URINE CAPACITY MEASURE: CPT

## 2018-02-20 PROCEDURE — 2500000003 HC RX 250 WO HCPCS: Performed by: STUDENT IN AN ORGANIZED HEALTH CARE EDUCATION/TRAINING PROGRAM

## 2018-02-20 PROCEDURE — 82947 ASSAY GLUCOSE BLOOD QUANT: CPT

## 2018-02-20 PROCEDURE — 85027 COMPLETE CBC AUTOMATED: CPT

## 2018-02-20 PROCEDURE — 6370000000 HC RX 637 (ALT 250 FOR IP): Performed by: STUDENT IN AN ORGANIZED HEALTH CARE EDUCATION/TRAINING PROGRAM

## 2018-02-20 PROCEDURE — 2580000003 HC RX 258: Performed by: NURSE PRACTITIONER

## 2018-02-20 PROCEDURE — S0028 INJECTION, FAMOTIDINE, 20 MG: HCPCS | Performed by: STUDENT IN AN ORGANIZED HEALTH CARE EDUCATION/TRAINING PROGRAM

## 2018-02-20 PROCEDURE — 2060000000 HC ICU INTERMEDIATE R&B

## 2018-02-20 PROCEDURE — 6360000002 HC RX W HCPCS: Performed by: INTERNAL MEDICINE

## 2018-02-20 PROCEDURE — 6370000000 HC RX 637 (ALT 250 FOR IP): Performed by: NURSE PRACTITIONER

## 2018-02-20 PROCEDURE — 94640 AIRWAY INHALATION TREATMENT: CPT

## 2018-02-20 PROCEDURE — 80048 BASIC METABOLIC PNL TOTAL CA: CPT

## 2018-02-20 PROCEDURE — 99232 SBSQ HOSP IP/OBS MODERATE 35: CPT | Performed by: INTERNAL MEDICINE

## 2018-02-20 PROCEDURE — 36415 COLL VENOUS BLD VENIPUNCTURE: CPT

## 2018-02-20 PROCEDURE — 94660 CPAP INITIATION&MGMT: CPT

## 2018-02-20 PROCEDURE — 6370000000 HC RX 637 (ALT 250 FOR IP): Performed by: FAMILY MEDICINE

## 2018-02-20 RX ORDER — LEVOFLOXACIN 250 MG/1
250 TABLET ORAL
Status: DISCONTINUED | OUTPATIENT
Start: 2018-02-20 | End: 2018-02-20

## 2018-02-20 RX ORDER — GABAPENTIN 100 MG/1
100 CAPSULE ORAL 3 TIMES DAILY
Status: DISCONTINUED | OUTPATIENT
Start: 2018-02-20 | End: 2018-02-20

## 2018-02-20 RX ORDER — ESCITALOPRAM OXALATE 20 MG/1
20 TABLET ORAL DAILY
Status: DISCONTINUED | OUTPATIENT
Start: 2018-02-20 | End: 2018-02-20

## 2018-02-20 RX ORDER — LINEZOLID 600 MG/1
600 TABLET, FILM COATED ORAL EVERY 12 HOURS SCHEDULED
Status: DISCONTINUED | OUTPATIENT
Start: 2018-02-20 | End: 2018-02-27 | Stop reason: HOSPADM

## 2018-02-20 RX ORDER — PREDNISONE 10 MG/1
10 TABLET ORAL DAILY
Status: DISCONTINUED | OUTPATIENT
Start: 2018-02-21 | End: 2018-02-23

## 2018-02-20 RX ORDER — PREGABALIN 25 MG/1
25 CAPSULE ORAL 2 TIMES DAILY
Status: DISCONTINUED | OUTPATIENT
Start: 2018-02-20 | End: 2018-02-22

## 2018-02-20 RX ADMIN — OXYCODONE HYDROCHLORIDE AND ACETAMINOPHEN 1 TABLET: 5; 325 TABLET ORAL at 01:58

## 2018-02-20 RX ADMIN — HEPARIN SODIUM 5000 UNITS: 5000 INJECTION, SOLUTION INTRAVENOUS; SUBCUTANEOUS at 20:31

## 2018-02-20 RX ADMIN — OXYCODONE HYDROCHLORIDE AND ACETAMINOPHEN 1 TABLET: 5; 325 TABLET ORAL at 10:03

## 2018-02-20 RX ADMIN — IPRATROPIUM BROMIDE AND ALBUTEROL SULFATE 1 AMPULE: .5; 3 SOLUTION RESPIRATORY (INHALATION) at 11:14

## 2018-02-20 RX ADMIN — INSULIN LISPRO 1 UNITS: 100 INJECTION, SOLUTION INTRAVENOUS; SUBCUTANEOUS at 17:36

## 2018-02-20 RX ADMIN — CARVEDILOL 6.25 MG: 6.25 TABLET, FILM COATED ORAL at 18:55

## 2018-02-20 RX ADMIN — LINEZOLID 600 MG: 600 TABLET, FILM COATED ORAL at 18:49

## 2018-02-20 RX ADMIN — TAMSULOSIN HYDROCHLORIDE 0.4 MG: 0.4 CAPSULE ORAL at 08:36

## 2018-02-20 RX ADMIN — IPRATROPIUM BROMIDE AND ALBUTEROL SULFATE 1 AMPULE: .5; 3 SOLUTION RESPIRATORY (INHALATION) at 19:12

## 2018-02-20 RX ADMIN — ISOSORBIDE MONONITRATE 30 MG: 30 TABLET, EXTENDED RELEASE ORAL at 08:36

## 2018-02-20 RX ADMIN — INSULIN LISPRO 1 UNITS: 100 INJECTION, SOLUTION INTRAVENOUS; SUBCUTANEOUS at 20:32

## 2018-02-20 RX ADMIN — LINEZOLID 600 MG: 600 TABLET, FILM COATED ORAL at 20:31

## 2018-02-20 RX ADMIN — OXYCODONE HYDROCHLORIDE AND ACETAMINOPHEN 1 TABLET: 5; 325 TABLET ORAL at 17:34

## 2018-02-20 RX ADMIN — HYDRALAZINE HYDROCHLORIDE 50 MG: 50 TABLET, FILM COATED ORAL at 05:51

## 2018-02-20 RX ADMIN — OXYCODONE HYDROCHLORIDE 5 MG: 5 TABLET ORAL at 21:48

## 2018-02-20 RX ADMIN — Medication 2 PUFF: at 20:31

## 2018-02-20 RX ADMIN — OXYCODONE HYDROCHLORIDE AND ACETAMINOPHEN 1 TABLET: 5; 325 TABLET ORAL at 05:51

## 2018-02-20 RX ADMIN — Medication 10 ML: at 08:41

## 2018-02-20 RX ADMIN — OXYCODONE HYDROCHLORIDE AND ACETAMINOPHEN 1 TABLET: 5; 325 TABLET ORAL at 21:48

## 2018-02-20 RX ADMIN — OXYCODONE HYDROCHLORIDE 5 MG: 5 TABLET ORAL at 10:03

## 2018-02-20 RX ADMIN — OXYCODONE HYDROCHLORIDE 5 MG: 5 TABLET ORAL at 17:34

## 2018-02-20 RX ADMIN — IPRATROPIUM BROMIDE AND ALBUTEROL SULFATE 1 AMPULE: .5; 3 SOLUTION RESPIRATORY (INHALATION) at 07:51

## 2018-02-20 RX ADMIN — FENTANYL CITRATE 25 MCG: 50 INJECTION INTRAMUSCULAR; INTRAVENOUS at 14:07

## 2018-02-20 RX ADMIN — DOCUSATE SODIUM 200 MG: 100 CAPSULE, LIQUID FILLED ORAL at 08:37

## 2018-02-20 RX ADMIN — FAMOTIDINE 10 MG: 10 INJECTION, SOLUTION INTRAVENOUS at 08:37

## 2018-02-20 RX ADMIN — PREGABALIN 25 MG: 25 CAPSULE ORAL at 20:38

## 2018-02-20 RX ADMIN — Medication 2 PUFF: at 08:39

## 2018-02-20 RX ADMIN — PREDNISONE 20 MG: 20 TABLET ORAL at 08:36

## 2018-02-20 RX ADMIN — HEPARIN SODIUM 5000 UNITS: 5000 INJECTION, SOLUTION INTRAVENOUS; SUBCUTANEOUS at 08:39

## 2018-02-20 RX ADMIN — CARVEDILOL 6.25 MG: 6.25 TABLET, FILM COATED ORAL at 08:36

## 2018-02-20 RX ADMIN — Medication 10 ML: at 08:39

## 2018-02-20 RX ADMIN — Medication 10 ML: at 20:34

## 2018-02-20 RX ADMIN — IPRATROPIUM BROMIDE AND ALBUTEROL SULFATE 1 AMPULE: .5; 3 SOLUTION RESPIRATORY (INHALATION) at 14:41

## 2018-02-20 RX ADMIN — OXYCODONE HYDROCHLORIDE 5 MG: 5 TABLET ORAL at 05:52

## 2018-02-20 RX ADMIN — OXYCODONE HYDROCHLORIDE 5 MG: 5 TABLET ORAL at 01:58

## 2018-02-20 RX ADMIN — Medication 10 ML: at 20:31

## 2018-02-20 ASSESSMENT — PAIN SCALES - GENERAL
PAINLEVEL_OUTOF10: 7
PAINLEVEL_OUTOF10: 0
PAINLEVEL_OUTOF10: 6
PAINLEVEL_OUTOF10: 4
PAINLEVEL_OUTOF10: 10
PAINLEVEL_OUTOF10: 4
PAINLEVEL_OUTOF10: 8
PAINLEVEL_OUTOF10: 0
PAINLEVEL_OUTOF10: 7
PAINLEVEL_OUTOF10: 6

## 2018-02-20 ASSESSMENT — PAIN DESCRIPTION - PAIN TYPE
TYPE: CHRONIC PAIN

## 2018-02-20 ASSESSMENT — PAIN DESCRIPTION - LOCATION
LOCATION: BACK

## 2018-02-20 NOTE — PROGRESS NOTES
Bronchodilator Assessment       Breath Sounds: diminished  SPO2: 97  FiO2: 36      · Bronchodilator assessment at level  3  ·   · []    Bronchodilator Assessment  BRONCHODILATOR ASSESSMENT SCORE  Score 0 1 2 3 4 5   Breath Sounds   []  Patient Baseline []  No Wheeze good aeration []  Faint, scattered wheezing, good aeration [x]  Expiratory Wheezing and or moderately diminished []  Insp/Exp wheeze and/or very diminished []  Insp/Exp and/ or marked distress   Respiratory Rate   []  Patient Baseline []  Less than 20 []  Less than 20 [x]  20-25 []  Greater than 25 []  Greater than 25   Peak flow % of Pred or PB []  NA   []  Greater than 90%  []  81-90% []  71-80% []  Less than or equal to 70%  or unable to perform []  Unable due to Respiratory Distress   Dyspnea re []  Patient Baseline []  No SOB []  No SOB [x]  SOB on exertion []  SOB min activity []  At rest/acute   e FEV% Predicted       []  NA []  Above 69%  []  Unable []  Above 60-69%  []  Unable []  Above 50-59%  []  Unable []  Above 35-49%  []  Unable []  Less than 35%  []  Unable          YAMILA MITCHELL      10:14 AM

## 2018-02-20 NOTE — PROGRESS NOTES
Negative for abdominal pain, heartburn, nausea and vomiting. Neurological: Negative for dizziness, weakness and headaches. Medications: Allergies: Allergies   Allergen Reactions    Codeine     Contrast [Iodides]      IVP dyes       Current Meds:   Scheduled Meds:    linezolid  600 mg Oral 2 times per day    heparin (porcine)  5,000 Units Subcutaneous 2 times per day    predniSONE  20 mg Oral Daily    tamsulosin  0.4 mg Oral Daily    [START ON 2/22/2018] darbepoetin trang-polysorbate  100 mcg Subcutaneous Weekly    lidocaine  1 patch Transdermal Daily    hydrALAZINE  50 mg Oral 3 times per day    isosorbide mononitrate  30 mg Oral Daily    carvedilol  6.25 mg Oral BID     heparin (porcine)  10,000 Units Intercatheter Once    famotidine (PEPCID) injection  10 mg Intravenous Daily    insulin lispro  0-6 Units Subcutaneous TID     insulin lispro  0-3 Units Subcutaneous Nightly    docusate sodium  200 mg Oral Daily    sodium chloride flush  10 mL Intravenous 2 times per day    ipratropium-albuterol  1 ampule Inhalation Q4H WA    mometasone-formoterol  2 puff Inhalation BID    sodium chloride flush  10 mL Intravenous Q12H     Continuous Infusions:    dextrose       PRN Meds: heparin (porcine), heparin (porcine), diazepam, oxyCODONE-acetaminophen **AND** oxyCODONE, fentanNYL, hydrALAZINE, sodium chloride flush, acetaminophen, magnesium hydroxide, bisacodyl, ondansetron, nicotine, albuterol, diazepam, glucose, dextrose, glucagon (rDNA), dextrose, ipratropium-albuterol, sodium chloride flush    Data:     Past Medical History:   has a past medical history of Abdominal pain, chronic, generalized; Cervicodynia; Chronic back pain; Chronic kidney disease; Cigarette smoker; COPD (chronic obstructive pulmonary disease) (HCC); DDD (degenerative disc disease), lumbar; Depression; Family history of cancer; Hernia, ventral; Hyperlipidemia; Iron deficiency anemia;  Lichen simplex chronicus; PUD Intraprocedural fluoroscopic spot images as above. See separate procedure report for more information. Xr Chest Standard (2 Vw)    Result Date: 2/5/2018  EXAMINATION: TWO VIEWS OF THE CHEST 2/5/2018 8:56 pm COMPARISON: November 24, 2015 HISTORY: ORDERING SYSTEM PROVIDED HISTORY: shortness of breath TECHNOLOGIST PROVIDED HISTORY: Reason for exam:->shortness of breath Ordering Physician Provided Reason for Exam: shortness of breath Acuity: Acute Type of Exam: Initial FINDINGS: Right hilar fullness has increased. Small pleural effusions are suspected. Chronic interstitial lung changes are again seen. Cardiomegaly. No pulmonary edema. Diffuse osteopenia with thoracic spine degenerative changes. Small pleural effusions. Increased right hilar density may reflect underlying adenopathy. Follow-up with chest CT may be helpful. Xr Femur Right (min 2 Views)    Result Date: 2/12/2018  EXAMINATION: 2 VIEWS OF THE RIGHT FEMUR 2/12/2018 9:41 am COMPARISON: None. HISTORY: ORDERING SYSTEM PROVIDED HISTORY: fall TECHNOLOGIST PROVIDED HISTORY: portable Reason for exam:->fall Ordering Physician Provided Reason for Exam: fall, right femur pain Acuity: Unknown Type of Exam: Unknown FINDINGS: Two views right femur obtained portably show no convincing evidence of acute fracture or dislocation. Mild degenerative changes of the hip. There are vascular calcifications. No radiopaque soft tissue foreign bodies. No localized soft tissue swelling is seen. Surgical clip in the pelvis. If the hip is the area of specific clinical concern dedicated views are recommended. No convincing evidence of acute fracture. Ct Chest Wo Contrast    Result Date: 2/6/2018  EXAMINATION: CT OF THE CHEST WITHOUT CONTRAST 2/6/2018 2:41 pm TECHNIQUE: CT of the chest was performed without the administration of intravenous contrast. Multiplanar reformatted images are provided for review.  Dose modulation, iterative reconstruction, and/or Soft Tissues/Bones: Vascular stent is seen along the left chest wall. No axillary adenopathy. No acute osseous abnormality. Multiple pulmonary nodules not seen on the previous study are concerning for malignancy. PET scan may be helpful for additional evaluation. Us Renal Complete    Result Date: 2/6/2018  EXAMINATION: RETROPERITONEAL ULTRASOUND OF THE KIDNEYS AND URINARY BLADDER 2/6/2018 COMPARISON: May 17, 2010 HISTORY: ORDERING SYSTEM PROVIDED HISTORY: RENAL FAILURE, CHRONIC (KIDNEY DISEASE) TECHNOLOGIST PROVIDED HISTORY: Acuity: Acute Type of Exam: Initial FINDINGS: Kidneys: The right kidney measures 9.6 cm in length and the left kidney measures 10 cm in length. Abnormally increased echogenicity is noted. No hydronephrosis or stones identified. Multiple bilateral cysts are present measuring up to 5.3 cm in the lower pole right kidney. Bladder: The bladder is well distended. Echogenic kidneys, consistent with intrinsic renal parenchymal disease. Bilateral renal cysts are noted. Xr Chest Portable    Result Date: 2/19/2018  EXAMINATION: SINGLE VIEW OF THE CHEST 2/19/2018 8:53 am COMPARISON: Previous chest x-ray from 02/14/2018 HISTORY: ORDERING SYSTEM PROVIDED HISTORY: line placement TECHNOLOGIST PROVIDED HISTORY: Reason for exam:->line placement Ordering Physician Provided Reason for Exam: post hemodialysis port Acuity: Unknown Type of Exam: Unknown Additional history of diabetes, chronic obstructive pulmonary disease, tobacco abuse, and hiatus hernia. FINDINGS: Right-sided IJ hemodialysis catheter in place, with its tips in the superior vena cava. Enlarged cardiac silhouette, stable by comparison. Mediastinal structures midline unchanged. Hyperinflated lungs with scattered parenchymal, right-sided and apical pleural abnormalities and basilar atelectasis or scarring, similar by comparison. No pneumothorax. Bones and soft tissues appear stable. Right IJ PermCath with its tips in the SVC. 2/12/2018  EXAMINATION: SINGLE VIEW OF THE CHEST 2/12/2018 9:41 am COMPARISON: Chest February 8, 2018. HISTORY: ORDERING SYSTEM PROVIDED HISTORY: line placement TECHNOLOGIST PROVIDED HISTORY: patient is moving to ICU Reason for exam:->line placement Ordering Physician Provided Reason for Exam: right side line placement Acuity: Unknown Type of Exam: Unknown FINDINGS: Interval placement of right-sided IJ line with tip in the SVC. No pneumothorax. Heart and mediastinal structures appear unchanged. COPD changes and vascular congestion and trace bilateral pleural effusions persists. Interval placement of right-sided IJ line with tip in the SVC. No pneumothorax. No significant change chest findings. Xr Chest Portable    Result Date: 2/8/2018  EXAMINATION: SINGLE VIEW OF THE CHEST 2/8/2018 9:05 am COMPARISON: February 5, 2018 HISTORY: ORDERING SYSTEM PROVIDED HISTORY: Crackles on Auscultation TECHNOLOGIST PROVIDED HISTORY: Reason for exam:->Crackles on Auscultation Ordering Physician Provided Reason for Exam: crackles Acuity: Unknown Type of Exam: Unknown FINDINGS: The cardiomediastinal silhouette is stable. There is flattening of the diaphragms, likely related to COPD. There is mild pulmonary vascular congestion. There is mild atelectasis and small pleural effusions at the bilateral lung bases. No evidence of pneumothorax. The osseous structures are stable. Mild cardiomegaly. Mild pulmonary vascular congestion. Probable COPD. Mild atelectasis and small pleural effusions at the bilateral lung bases. Xr Chest Portable    Result Date: 2/6/2018  EXAMINATION: SINGLE VIEW OF THE CHEST 2/6/2018 6:52 am COMPARISON: 02/05/2018 HISTORY: ORDERING SYSTEM PROVIDED HISTORY: AECOPD TECHNOLOGIST PROVIDED HISTORY: Reason for exam:->AECOPD Acuity: Unknown Type of Exam: Unknown Additional signs and symptoms: Following COPD. FINDINGS: Stable mild cardiomegaly and mild pulmonary vascular congestion.   Pulmonary emphysema with bilateral fibrotic changes. Bilateral apical pleural thickening. Emphysematous bulla in the right lung base. Bilateral prominent yinka consistent with enlargement of the central pulmonary arteries. Right suprahilar parenchymal consolidation extending into the right upper lobe, unchanged. Blunting of the costophrenic angle suggesting small effusions. No pneumothorax. No significant interval radiographic change. Vl Dup Lower Extremity Arteries Bilateral    Result Date: 2/15/2018    [facility]  Vascular Lower Extremities Arterial Duplex Procedure   Patient Name  HUI CARRION [facility]  Date of Study           02/15/2018   Date of Birth 1950     Gender                  Male   Age           75 year(s)     Race                    Other   Room Number   2007           Height:                 67.72 inch, 172 cm   Corporate ID  [id number]     Weight:                 128 pounds, 58 kg  #   Patient Acct  [de-identified]  BSA:        1.69 m^2    BMI:      19.61  #                                                              kg/m^2   MR #          [id number]         Sonographer             Casper Amador   Accession #   [id number]      Interpreting Physician  Michelle Jamison   Referring                    Referring Physician     Sandro Simon  Nurse  Practitioner  Procedure Type of Study:   Extremities Arteries: Lower Extremities Arterial Duplex, Arterial Scan  Lower Bilateral.  Indications for Study:Absent pulses. Patient Status: In Patient. Technical Quality:Limited visualization. Limitation reason:Patient movement. Comments:Incidental finding: Patient appears to have a femoral to femoral bypass. Bypass appears to be totally occluded when imaged with B-Mode. Bypass has no detectable velocities.   Conclusions   Summary   Simultaneous real time imaging utilizing B-Mode, color doppler and  spectral waveform analysis was performed on bilateral lower extremities  for arterial examination: Study demonstrates:   ABNORMAL STUDY   Occluded femoral to femoral bypass graft. Severely reduced velocities with monophasic waveforms in the external  iliac, common femoral, profunda, superficial femoral, and popliteal  arteries bilateraly concerning for aortoiliac disease. Recommendations   Correlate clinically. Signature   ----------------------------------------------------------------  Electronically signed by Casper Amador(Sonographer) on  02/15/2018 05:20 PM  ----------------------------------------------------------------   ----------------------------------------------------------------  Electronically signed by Ruthanna Fothergill, Mohammed(Interpreting  physician) on 02/15/2018 06:02 PM  ----------------------------------------------------------------  Findings:   Right Impression:                   Left Impression:  Decreased velocities and monophasic Decreased velocities and monophasic  waveforms in the external iliac,    waveforms in the external iliac,  common femoral, profunda,           common femoral, profunda, superficial  superficial femoral, popliteal and  femoral, popliteal, posterior tibial  posterior tibial arteries. and peroneal arteries. Occluded femoral to femoral artery  Occluded femoral to femoral artery  bypass graft. bypass graft. Velocities are measured in cm/s ; Diameters are measured in mm LE Duplex Measurements +------------++-----+-----+--------+--------++----+-----+--------+---------+ ! ! !Right! ! Left    !        !!    !     !        !         ! +------------++-----+-----+--------+--------++----+-----+--------+---------+ ! Location    ! !PSV  ! Ratio! Wave    ! Diameter!!PSV ! Ratio! Wave    ! Diameter ! !            !! !     !Chryl Snuffer.   !        !!    ! !Desc.   !         ! +------------++-----+-----+--------+--------++----+-----+--------+---------+ ! Dist EIA    !!22.3 !     !        !6       !!87.6!     !        !5        ! +------------------------------------+----------+---------------+----------+ ! Sapheno Femoral Junction            ! Yes       ! Yes            ! None      ! +------------------------------------+----------+---------------+----------+ ! PTV                                 ! Partial   !Yes            ! None      ! +------------------------------------+----------+---------------+----------+ ! Peroneal                            !Partial   !Yes            ! None      ! +------------------------------------+----------+---------------+----------+ ! Gastroc                             ! Yes       ! Yes            ! None      ! +------------------------------------+----------+---------------+----------+ ! GSV Thigh                           ! Yes       ! Yes            ! None      ! +------------------------------------+----------+---------------+----------+ ! GSV Knee                            ! Yes       ! Yes            ! None      ! +------------------------------------+----------+---------------+----------+ ! GSV Ankle                           ! Yes       ! Yes            ! None      ! +------------------------------------+----------+---------------+----------+ ! SSV                                 ! Yes       ! Yes            ! None      ! +------------------------------------+----------+---------------+----------+    Fluoro For Surgical Procedures    Result Date: 2/19/2018  Radiology exam is complete. No Radiologist dictation. Please follow up with ordering provider.      Vl Arterial Pvr Lower    Result Date: 2/15/2018    [facility]  Vascular Lower Arterial Plethysmography Procedure   Patient Name   [facility] Date of Study           02/15/2018                 C   Date of Birth  1950  Gender                  Male   Age            75 year(s)  Race                    Other   Room Number    2007        Height:                 67.72 inch, 172 cm   Corporate ID # [id number]  Weight:                 128 pounds, 58 kg   Patient digit waveform. Significant pressure reduction       Significant pressure reduction  between the brachial cuff and thigh  between the brachial cuff and thigh  cuff suggesting aorto-iliac disease. cuff suggesting aorto-iliac disease. Significant pressure reduction       NICO demonstrates critical limb  between the calf and ankle cuffs     ischemia.  suggesting tibial artery disease. TBI demonstrates critical limb  NICO demonstrates critical limb       ischemia. ischemia. TBI demonstrates critical limb  ischemia. Velocities are measured in cm/s ; Diameters are measured in mm Pressures +--------------------------------------++--------+-----+----+--------+-----+ ! ! !Right   ! ! Left!        !     ! +--------------------------------------++--------+-----+----+--------+-----+ ! Location                              ! !Pressure! Ratio! !Pressure! Ratio! +--------------------------------------++--------+-----+----+--------+-----+ ! Thigh                                 !!48      !0.35 ! !50      !0.36 ! +--------------------------------------++--------+-----+----+--------+-----+ ! Calf                                  !!136     !0.99 !    !49      !0.36 ! +--------------------------------------++--------+-----+----+--------+-----+ ! Ankle PT                              !!30      !0.22 !    !33      !0.24 ! +--------------------------------------++--------+-----+----+--------+-----+ ! Ankle DP                              !!39      !0.28 !    !33      !0.24 ! +--------------------------------------++--------+-----+----+--------+-----+ ! Great Toe                             !!20      !0.14 !    !21      !0.15 ! +--------------------------------------++--------+-----+----+--------+-----+   - Brachial Pressure:Left:138.   - NICO:Right: 0.28. Left: 0.24.  Right Plethysmographic Results

## 2018-02-20 NOTE — PROGRESS NOTES
Pt sat was low and placed on bipap per RN, settings 18/8, RR 20, O2 45%, sat 94%, resting comfortably.

## 2018-02-20 NOTE — PROGRESS NOTES
Nephrology Progress Note    Subjective/     Patient is being followed by nephrology for the management of acute kidney injury superimposed on chronic kidney disease stage 4. This is a 75 y.o. male active smoker of 2 PPD for many years, hyperlipidemia, COPD and chronic kidney disease stage 4 secondary to hypertension + type 2 DM (baseline creatinine of 2.6-2.8 mg/dl), who presented with decreased urine output and general weakness and was found to have a potassium of 6.0 and BUN/creatinne of 77/3.70 mg/dl. Today, he complains of general weakness and chronic nonproductive cough. He does not have hemoptysis or shortness of breath. He had HD yesterday  with a right IJ tunneled catheter s/p placement on 2/19/18  Has right foot drop and being investigated. Objective/     Vitals:    02/20/18 0530 02/20/18 0545 02/20/18 0753 02/20/18 1114   BP:  (!) 129/52     Pulse:  68     Resp:    20   Temp:  97.9 °F (36.6 °C)     TempSrc:  Oral     SpO2:  98% 97% 93%   Weight: 123 lb 0.3 oz (55.8 kg)      Height:         24HR INTAKE/OUTPUT:      Intake/Output Summary (Last 24 hours) at 02/20/18 1243  Last data filed at 02/20/18 0900   Gross per 24 hour   Intake              301 ml   Output             1050 ml   Net             -749 ml     Patient Vitals for the past 96 hrs (Last 3 readings):   Weight   02/20/18 0530 123 lb 0.3 oz (55.8 kg)   02/19/18 1408 124 lb 12.5 oz (56.6 kg)   02/19/18 1031 130 lb 1.1 oz (59 kg)       Constitutional:  Alert, awake,but in apparent distress  Cardiovascular:  S1, S2 without m/r/g  Respiratory:  Diminished. Abdomen: +bs, soft, nt  Ext: Decreased LE edema  Right foot drop.     Data/  Recent Labs      02/18/18   0438  02/19/18   0451  02/20/18   0455   WBC  8.1  6.3  5.9   HGB  7.8*  7.4*  8.3*   HCT  24.3*  23.0*  27.2*   MCV  99.9  100.0  105.1*   PLT  116*  108*  106*     Recent Labs      02/18/18   0438  02/19/18   0451  02/20/18   0455   NA  133*  132*  134*   K  4.6  4.9  3.9   CL  96*

## 2018-02-20 NOTE — PROGRESS NOTES
[address]   OCCUPATIONAL THERAPY MISSED TREATMENT NOTE   INPATIENT   Date: 18  Patient Name: Marilou Damon       Room: 5606/7483-66  MRN: [id number]   Account #: [de-identified]    : 1950  (76 y.o.)  Gender: male                 REASON FOR MISSED TREATMENT:  Patient with another ancillary department   -   18 OT attempt x2 at 10:24 pt w Md, at 11:14 pt w respiratory.  Still needs OT eval.         Arsalan Aguila, OT

## 2018-02-20 NOTE — PROGRESS NOTES
Pulmonary Progress Note  [facility]      Patient - Jackie Nam,  Age - 75 y.o.    - 1950      Room Number - 2098/2098-01   MRN -  [id number]   Acct # - [de-identified]  Date of Admission -  2018  7:29 PM        Consulting Magda Habermann, MD  Primary Care Physician - Grecia Layne MD     SUBJECTIVE   Remains in fair spirits  On bipap  sats 99%    OBJECTIVE   VITALS    height is 5' 8\" (1.727 m) and weight is 123 lb 0.3 oz (55.8 kg). His oral temperature is 97.9 °F (36.6 °C). His blood pressure is 129/52 (abnormal) and his pulse is 68. His respiration is 18 and oxygen saturation is 98%. Body mass index is 18.7 kg/m². Temperature Range: Temp: 97.9 °F (36.6 °C) Temp  Av.9 °F (36.6 °C)  Min: 97.6 °F (36.4 °C)  Max: 98.1 °F (36.7 °C)  BP Range:  Systolic (65EYK), YIK:175 , Min:127 , IVORY:399     Diastolic (74TGZ), GZJ:71, Min:47, Max:52    Pulse Range: Pulse  Av  Min: 66  Max: 73  Respiration Range: Resp  Av.9  Min: 18  Max: 23  Current Pulse Ox[de-identified]  SpO2: 98 %  24HR Pulse Ox Range:  SpO2  Av.9 %  Min: 89 %  Max: 99 %  Oxygen Amount and Delivery: O2 Flow Rate (L/min): 3 L/min    Wt Readings from Last 3 Encounters:   18 123 lb 0.3 oz (55.8 kg)   16 133 lb (60.3 kg)   11/23/15 134 lb 3.2 oz (60.9 kg)       I/O (24 Hours)    Intake/Output Summary (Last 24 hours) at 18 1516  Last data filed at 18 0900   Gross per 24 hour   Intake              301 ml   Output             1050 ml   Net             -749 ml       EXAM     General Appearance  Awake, alert, oriented, in no acute distress  HEENT - normocephalic, atraumatic. Neck - Supple,  trachea midline   Lungs - coarse no wheezes no rhonchi  Heart Exam:PMI normal. No lifts, heaves, or thrills. RRR. No murmurs, clicks, gallops, or rubs  Abdomen Exam: Abdomen soft, non-tender.   Extremity Exam: no edema    MEDS      linezolid  600 mg Oral 2 times per day   

## 2018-02-20 NOTE — PLAN OF CARE
Problem: Gas Exchange - Impaired:  Goal: Levels of oxygenation will improve  Levels of oxygenation will improve   Outcome: Ongoing  Patient oxygen saturation 95% or higher when on bipap. Patient with oxygen saturation above 90% on 4L nasal cannula. Patient with minimal reserves - unable to maintain adequate oxygenation with no oxygen support. Problem: Falls - Risk of  Goal: Absence of falls  Outcome: Ongoing  Patient safety maintained. Bed locked and lowest position. Call light within reach. Hourly rounding performed. No falls or injuries. Will continue to monitor. Problem: Pain:  Goal: Pain level will decrease  Pain level will decrease   Outcome: Ongoing  Patient with chronic back pain. Receiving PRN pain medications. Problem: Risk for Impaired Skin Integrity  Goal: Tissue integrity - skin and mucous membranes  Structural intactness and normal physiological function of skin and  mucous membranes. Outcome: Ongoing  Patient with no known skin issues:  Scattered bruising, left hip abrasion, reddened buttocks. No other skin issues noted. Will continue to monitor.

## 2018-02-20 NOTE — CONSULTS
Number of children: N/A    Years of education: N/A     Occupational History    Not on file. Social History Main Topics    Smoking status: Current Every Day Smoker     Packs/day: 2.00     Types: Cigarettes    Smokeless tobacco: Not on file    Alcohol use No    Drug use: No    Sexual activity: Not on file     Other Topics Concern    Not on file     Social History Narrative    No narrative on file       Family History:   History reviewed. No pertinent family history. REVIEW OF SYSTEMS:  ROS    PHYSICAL EXAM:  Vital Signs:  BP (!) 145/65   Pulse 77   Temp 97.9 °F (36.6 °C)   Resp 18   Ht 5' 8\" (1.727 m)   Wt 123 lb 0.3 oz (55.8 kg)   SpO2 98%   BMI 18.70 kg/m²     Physical Exam   Constitutional: He is oriented to person, place, and time. Elderly, frail but alert and engaging gentleman in no obvious painful or respiratory distress receiving oxygen via nasal cannula. HENT:   Head: Normocephalic. Eyes: EOM are normal. Pupils are equal, round, and reactive to light. Cardiovascular: Normal rate. Exam reveals no gallop and no friction rub. Murmur heard. Pulmonary/Chest: Effort normal and breath sounds normal. No respiratory distress. He has no wheezes. He has no rales. Abdominal: Bowel sounds are normal. He exhibits distension. There is tenderness. Musculoskeletal:   Had gross movement of lower extremities bilaterally. Neurological: He is alert and oriented to person, place, and time. Skin: Skin is warm and dry.    Skin over lower extremities has 'burning' sensation upon palpation bilaterally up to the level of the knee   Psychiatric: His behavior is normal. Thought content normal.     Right Ankle Exam     Comments:  Numb          DATA:  Old records have not been requested    IMPRESSION  Bilateral neuropathic pain of uncertain etiology    RECOMMENDATIONS:  Discontinue gabapentin  Start Pregabalin 25 mg po bid  Will continue to follow      NAME:  Madisyn Friend  MRN: [id number]   Date of

## 2018-02-20 NOTE — PROGRESS NOTES
Writer pulled one tab of percocet - 5/325 and dropped in on floor in patient room. Second tab pulled and administered. Original tab wasted and witnessed by Violetta Hairston RN.

## 2018-02-21 ENCOUNTER — TELEPHONE (OUTPATIENT)
Dept: UROLOGY | Age: 68
End: 2018-02-21

## 2018-02-21 ENCOUNTER — APPOINTMENT (OUTPATIENT)
Dept: GENERAL RADIOLOGY | Age: 68
DRG: 673 | End: 2018-02-21
Payer: COMMERCIAL

## 2018-02-21 LAB
-: ABNORMAL
AMORPHOUS: ABNORMAL
ANION GAP SERPL CALCULATED.3IONS-SCNC: 10 MMOL/L (ref 9–17)
BACTERIA: ABNORMAL
BILIRUBIN URINE: NEGATIVE
BUN BLDV-MCNC: 49 MG/DL (ref 8–23)
BUN/CREAT BLD: ABNORMAL (ref 9–20)
CALCIUM SERPL-MCNC: 8.2 MG/DL (ref 8.6–10.4)
CASTS UA: ABNORMAL /LPF
CHLORIDE BLD-SCNC: 99 MMOL/L (ref 98–107)
CO2: 29 MMOL/L (ref 20–31)
COLOR: YELLOW
COMMENT UA: ABNORMAL
CREAT SERPL-MCNC: 3.13 MG/DL (ref 0.7–1.2)
CRYSTALS, UA: ABNORMAL /HPF
EKG ATRIAL RATE: 73 BPM
EKG P AXIS: 87 DEGREES
EKG P-R INTERVAL: 164 MS
EKG Q-T INTERVAL: 382 MS
EKG QRS DURATION: 120 MS
EKG QTC CALCULATION (BAZETT): 420 MS
EKG R AXIS: 18 DEGREES
EKG T AXIS: 54 DEGREES
EKG VENTRICULAR RATE: 73 BPM
EPITHELIAL CELLS UA: ABNORMAL /HPF
GFR AFRICAN AMERICAN: 24 ML/MIN
GFR NON-AFRICAN AMERICAN: 20 ML/MIN
GFR SERPL CREATININE-BSD FRML MDRD: ABNORMAL ML/MIN/{1.73_M2}
GFR SERPL CREATININE-BSD FRML MDRD: ABNORMAL ML/MIN/{1.73_M2}
GLUCOSE BLD-MCNC: 101 MG/DL (ref 75–110)
GLUCOSE BLD-MCNC: 164 MG/DL (ref 75–110)
GLUCOSE BLD-MCNC: 68 MG/DL (ref 75–110)
GLUCOSE BLD-MCNC: 83 MG/DL (ref 70–99)
GLUCOSE BLD-MCNC: 91 MG/DL (ref 75–110)
GLUCOSE URINE: ABNORMAL
HCT VFR BLD CALC: 24.3 % (ref 41–53)
HEMOGLOBIN: 7.6 G/DL (ref 13.5–17.5)
KETONES, URINE: NEGATIVE
LEUKOCYTE ESTERASE, URINE: NEGATIVE
MCH RBC QN AUTO: 32.5 PG (ref 26–34)
MCHC RBC AUTO-ENTMCNC: 31.5 G/DL (ref 31–37)
MCV RBC AUTO: 103.3 FL (ref 80–100)
MUCUS: ABNORMAL
NITRITE, URINE: NEGATIVE
NRBC AUTOMATED: ABNORMAL PER 100 WBC
OTHER OBSERVATIONS UA: ABNORMAL
PDW BLD-RTO: 18.5 % (ref 11.5–14.9)
PH UA: 5.5 (ref 5–8)
PLATELET # BLD: 96 K/UL (ref 150–450)
PMV BLD AUTO: 8.2 FL (ref 6–12)
POTASSIUM SERPL-SCNC: 5.6 MMOL/L (ref 3.7–5.3)
PROTEIN UA: ABNORMAL
RBC # BLD: 2.35 M/UL (ref 4.5–5.9)
RBC UA: ABNORMAL /HPF
RENAL EPITHELIAL, UA: ABNORMAL /HPF
SODIUM BLD-SCNC: 138 MMOL/L (ref 135–144)
SPECIFIC GRAVITY UA: 1.01 (ref 1–1.03)
TRICHOMONAS: ABNORMAL
TROPONIN INTERP: NORMAL
TROPONIN INTERP: NORMAL
TROPONIN T: <0.03 NG/ML
TROPONIN T: <0.03 NG/ML
TURBIDITY: ABNORMAL
URINE HGB: NEGATIVE
UROBILINOGEN, URINE: NORMAL
WBC # BLD: 5.8 K/UL (ref 3.5–11)
WBC UA: ABNORMAL /HPF
YEAST: ABNORMAL

## 2018-02-21 PROCEDURE — 2500000003 HC RX 250 WO HCPCS: Performed by: STUDENT IN AN ORGANIZED HEALTH CARE EDUCATION/TRAINING PROGRAM

## 2018-02-21 PROCEDURE — 2060000000 HC ICU INTERMEDIATE R&B

## 2018-02-21 PROCEDURE — 6370000000 HC RX 637 (ALT 250 FOR IP): Performed by: NURSE PRACTITIONER

## 2018-02-21 PROCEDURE — 80048 BASIC METABOLIC PNL TOTAL CA: CPT

## 2018-02-21 PROCEDURE — 97166 OT EVAL MOD COMPLEX 45 MIN: CPT

## 2018-02-21 PROCEDURE — 6360000002 HC RX W HCPCS: Performed by: INTERNAL MEDICINE

## 2018-02-21 PROCEDURE — 81001 URINALYSIS AUTO W/SCOPE: CPT

## 2018-02-21 PROCEDURE — 51702 INSERT TEMP BLADDER CATH: CPT

## 2018-02-21 PROCEDURE — 85027 COMPLETE CBC AUTOMATED: CPT

## 2018-02-21 PROCEDURE — 6370000000 HC RX 637 (ALT 250 FOR IP): Performed by: STUDENT IN AN ORGANIZED HEALTH CARE EDUCATION/TRAINING PROGRAM

## 2018-02-21 PROCEDURE — 6370000000 HC RX 637 (ALT 250 FOR IP): Performed by: FAMILY MEDICINE

## 2018-02-21 PROCEDURE — 94660 CPAP INITIATION&MGMT: CPT

## 2018-02-21 PROCEDURE — 36415 COLL VENOUS BLD VENIPUNCTURE: CPT

## 2018-02-21 PROCEDURE — 94762 N-INVAS EAR/PLS OXIMTRY CONT: CPT

## 2018-02-21 PROCEDURE — 82947 ASSAY GLUCOSE BLOOD QUANT: CPT

## 2018-02-21 PROCEDURE — 6370000000 HC RX 637 (ALT 250 FOR IP): Performed by: INTERNAL MEDICINE

## 2018-02-21 PROCEDURE — 71046 X-RAY EXAM CHEST 2 VIEWS: CPT

## 2018-02-21 PROCEDURE — 90937 HEMODIALYSIS REPEATED EVAL: CPT

## 2018-02-21 PROCEDURE — G8988 SELF CARE GOAL STATUS: HCPCS

## 2018-02-21 PROCEDURE — 84484 ASSAY OF TROPONIN QUANT: CPT

## 2018-02-21 PROCEDURE — 99232 SBSQ HOSP IP/OBS MODERATE 35: CPT | Performed by: PAIN MEDICINE

## 2018-02-21 PROCEDURE — 51798 US URINE CAPACITY MEASURE: CPT

## 2018-02-21 PROCEDURE — 2580000003 HC RX 258: Performed by: EMERGENCY MEDICINE

## 2018-02-21 PROCEDURE — G8987 SELF CARE CURRENT STATUS: HCPCS

## 2018-02-21 PROCEDURE — 94640 AIRWAY INHALATION TREATMENT: CPT

## 2018-02-21 PROCEDURE — S0028 INJECTION, FAMOTIDINE, 20 MG: HCPCS | Performed by: STUDENT IN AN ORGANIZED HEALTH CARE EDUCATION/TRAINING PROGRAM

## 2018-02-21 PROCEDURE — 2580000003 HC RX 258: Performed by: NURSE PRACTITIONER

## 2018-02-21 PROCEDURE — 99233 SBSQ HOSP IP/OBS HIGH 50: CPT | Performed by: INTERNAL MEDICINE

## 2018-02-21 PROCEDURE — 93005 ELECTROCARDIOGRAM TRACING: CPT

## 2018-02-21 RX ADMIN — PREDNISONE 10 MG: 10 TABLET ORAL at 11:58

## 2018-02-21 RX ADMIN — OXYCODONE HYDROCHLORIDE AND ACETAMINOPHEN 1 TABLET: 5; 325 TABLET ORAL at 09:28

## 2018-02-21 RX ADMIN — OXYCODONE HYDROCHLORIDE AND ACETAMINOPHEN 1 TABLET: 5; 325 TABLET ORAL at 17:32

## 2018-02-21 RX ADMIN — CARVEDILOL 6.25 MG: 6.25 TABLET, FILM COATED ORAL at 17:31

## 2018-02-21 RX ADMIN — Medication 2 PUFF: at 19:55

## 2018-02-21 RX ADMIN — OXYCODONE HYDROCHLORIDE AND ACETAMINOPHEN 1 TABLET: 5; 325 TABLET ORAL at 05:31

## 2018-02-21 RX ADMIN — ISOSORBIDE MONONITRATE 30 MG: 30 TABLET, EXTENDED RELEASE ORAL at 11:57

## 2018-02-21 RX ADMIN — DOCUSATE SODIUM 200 MG: 100 CAPSULE, LIQUID FILLED ORAL at 11:57

## 2018-02-21 RX ADMIN — OXYCODONE HYDROCHLORIDE 5 MG: 5 TABLET ORAL at 09:28

## 2018-02-21 RX ADMIN — OXYCODONE HYDROCHLORIDE AND ACETAMINOPHEN 1 TABLET: 5; 325 TABLET ORAL at 01:44

## 2018-02-21 RX ADMIN — PREGABALIN 25 MG: 25 CAPSULE ORAL at 19:55

## 2018-02-21 RX ADMIN — Medication 10 ML: at 11:57

## 2018-02-21 RX ADMIN — OXYCODONE HYDROCHLORIDE 5 MG: 5 TABLET ORAL at 13:32

## 2018-02-21 RX ADMIN — OXYCODONE HYDROCHLORIDE 5 MG: 5 TABLET ORAL at 17:32

## 2018-02-21 RX ADMIN — OXYCODONE HYDROCHLORIDE AND ACETAMINOPHEN 1 TABLET: 5; 325 TABLET ORAL at 13:32

## 2018-02-21 RX ADMIN — OXYCODONE HYDROCHLORIDE AND ACETAMINOPHEN 1 TABLET: 5; 325 TABLET ORAL at 21:49

## 2018-02-21 RX ADMIN — IPRATROPIUM BROMIDE AND ALBUTEROL SULFATE 1 AMPULE: .5; 3 SOLUTION RESPIRATORY (INHALATION) at 19:37

## 2018-02-21 RX ADMIN — HYDRALAZINE HYDROCHLORIDE 50 MG: 50 TABLET, FILM COATED ORAL at 15:07

## 2018-02-21 RX ADMIN — PREGABALIN 25 MG: 25 CAPSULE ORAL at 13:11

## 2018-02-21 RX ADMIN — Medication 10 ML: at 19:55

## 2018-02-21 RX ADMIN — OXYCODONE HYDROCHLORIDE 5 MG: 5 TABLET ORAL at 01:45

## 2018-02-21 RX ADMIN — HEPARIN SODIUM 5000 UNITS: 5000 INJECTION, SOLUTION INTRAVENOUS; SUBCUTANEOUS at 11:58

## 2018-02-21 RX ADMIN — OXYCODONE HYDROCHLORIDE 5 MG: 5 TABLET ORAL at 21:49

## 2018-02-21 RX ADMIN — IPRATROPIUM BROMIDE AND ALBUTEROL SULFATE 1 AMPULE: .5; 3 SOLUTION RESPIRATORY (INHALATION) at 07:18

## 2018-02-21 RX ADMIN — HYDRALAZINE HYDROCHLORIDE 50 MG: 50 TABLET, FILM COATED ORAL at 05:32

## 2018-02-21 RX ADMIN — INSULIN LISPRO 1 UNITS: 100 INJECTION, SOLUTION INTRAVENOUS; SUBCUTANEOUS at 20:55

## 2018-02-21 RX ADMIN — OXYCODONE HYDROCHLORIDE 5 MG: 5 TABLET ORAL at 05:31

## 2018-02-21 RX ADMIN — IPRATROPIUM BROMIDE AND ALBUTEROL SULFATE 1 AMPULE: .5; 3 SOLUTION RESPIRATORY (INHALATION) at 15:16

## 2018-02-21 RX ADMIN — LINEZOLID 600 MG: 600 TABLET, FILM COATED ORAL at 20:55

## 2018-02-21 RX ADMIN — FAMOTIDINE 10 MG: 10 INJECTION, SOLUTION INTRAVENOUS at 11:57

## 2018-02-21 RX ADMIN — LINEZOLID 600 MG: 600 TABLET, FILM COATED ORAL at 12:10

## 2018-02-21 RX ADMIN — Medication 2 PUFF: at 12:03

## 2018-02-21 RX ADMIN — CARVEDILOL 6.25 MG: 6.25 TABLET, FILM COATED ORAL at 12:03

## 2018-02-21 RX ADMIN — TAMSULOSIN HYDROCHLORIDE 0.4 MG: 0.4 CAPSULE ORAL at 11:57

## 2018-02-21 RX ADMIN — HEPARIN SODIUM 5000 UNITS: 5000 INJECTION, SOLUTION INTRAVENOUS; SUBCUTANEOUS at 19:55

## 2018-02-21 RX ADMIN — INSULIN LISPRO 1 UNITS: 100 INJECTION, SOLUTION INTRAVENOUS; SUBCUTANEOUS at 11:56

## 2018-02-21 ASSESSMENT — 9 HOLE PEG TEST
TEST_RESULT: FUNCTIONAL
TEST_RESULT: FUNCTIONAL

## 2018-02-21 ASSESSMENT — PAIN SCALES - GENERAL
PAINLEVEL_OUTOF10: 7
PAINLEVEL_OUTOF10: 0
PAINLEVEL_OUTOF10: 8
PAINLEVEL_OUTOF10: 7
PAINLEVEL_OUTOF10: 8
PAINLEVEL_OUTOF10: 8
PAINLEVEL_OUTOF10: 0
PAINLEVEL_OUTOF10: 10
PAINLEVEL_OUTOF10: 9
PAINLEVEL_OUTOF10: 7
PAINLEVEL_OUTOF10: 3

## 2018-02-21 ASSESSMENT — ENCOUNTER SYMPTOMS
SHORTNESS OF BREATH: 1
COUGH: 1
ABDOMINAL PAIN: 1
COUGH: 0
ABDOMINAL PAIN: 0

## 2018-02-21 ASSESSMENT — PAIN DESCRIPTION - LOCATION
LOCATION: BACK
LOCATION: ABDOMEN;BACK
LOCATION: ABDOMEN;GENERALIZED
LOCATION: BACK;GENERALIZED
LOCATION: ABDOMEN;BACK
LOCATION: BACK;FOOT
LOCATION: BACK

## 2018-02-21 ASSESSMENT — PAIN DESCRIPTION - PAIN TYPE
TYPE: CHRONIC PAIN

## 2018-02-21 ASSESSMENT — PAIN DESCRIPTION - ORIENTATION: ORIENTATION: RIGHT;LEFT

## 2018-02-21 NOTE — PROGRESS NOTES
Cardiovascular: Negative for chest pain and leg swelling. Gastrointestinal: Negative for abdominal pain. Musculoskeletal:        Bilateral foot pain   Neurological: Positive for weakness. Negative for headaches. Medications: Allergies: Allergies   Allergen Reactions    Codeine     Contrast [Iodides]      IVP dyes     Current Meds:   Scheduled Meds:    linezolid  600 mg Oral 2 times per day    predniSONE  10 mg Oral Daily    pregabalin  25 mg Oral BID    heparin (porcine)  5,000 Units Subcutaneous 2 times per day    tamsulosin  0.4 mg Oral Daily    [START ON 2/22/2018] darbepoetin trang-polysorbate  100 mcg Subcutaneous Weekly    lidocaine  1 patch Transdermal Daily    hydrALAZINE  50 mg Oral 3 times per day    isosorbide mononitrate  30 mg Oral Daily    carvedilol  6.25 mg Oral BID     heparin (porcine)  10,000 Units Intercatheter Once    famotidine (PEPCID) injection  10 mg Intravenous Daily    insulin lispro  0-6 Units Subcutaneous TID     insulin lispro  0-3 Units Subcutaneous Nightly    docusate sodium  200 mg Oral Daily    sodium chloride flush  10 mL Intravenous 2 times per day    ipratropium-albuterol  1 ampule Inhalation Q4H WA    mometasone-formoterol  2 puff Inhalation BID    sodium chloride flush  10 mL Intravenous Q12H     Continuous Infusions:    dextrose       PRN Meds: heparin (porcine), heparin (porcine), diazepam, oxyCODONE-acetaminophen **AND** oxyCODONE, fentanNYL, hydrALAZINE, sodium chloride flush, acetaminophen, magnesium hydroxide, bisacodyl, ondansetron, nicotine, albuterol, diazepam, glucose, dextrose, glucagon (rDNA), dextrose, ipratropium-albuterol, sodium chloride flush    Data:     Past Medical History:   has a past medical history of Abdominal pain, chronic, generalized; Cervicodynia;  Chronic back pain; Chronic kidney disease; Cigarette smoker; COPD (chronic obstructive pulmonary disease) ([facility] [address] DDD (degenerative disc disease), lumbar; PM     METHICILLIN RESISTANT STAPHYLOCOCCUS AUREUS HEAVY GROWTH    CULTURE NORMAL RESPIRATORY ALLY LIGHT GROWTH 02/15/2018 06:04 PM    CULTURE  02/15/2018 06:04 PM     Performed at [facility] [id number] [facility], [address] [place] [address] [phone]     Radiology:     EXAMINATION:   SINGLE VIEW OF THE CHEST       2/19/2018 8:53 am     Right IJ PermCath with its tips in the SVC.  No pneumothorax.       No acute abnormality with similar lung findings, as above     Physical Examination:        Physical Exam   Constitutional: No distress. Seen in dialysis unit   HENT:   Head: Normocephalic and atraumatic. Cardiovascular: Normal rate and regular rhythm. Murmur heard. Systolic murmur is present   Pulmonary/Chest: No respiratory distress. He has no wheezes. Breath sounds heard bilaterally   Abdominal: Soft. He exhibits no distension. There is no tenderness. There is no guarding. Musculoskeletal: He exhibits no edema. Tenderness: both feet. Neurological: He is alert. Skin: Skin is warm and dry. He is not diaphoretic.      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) [Z33.588K]     Gastrointestinal hemorrhage [K92.2]     Abdominal wall hernia [K43.9]     Absolute anemia [D64.9]     Acute on chronic respiratory failure with hypercapnia (HCC) [J96.22] 02/08/2018    Acute on chronic systolic heart failure (HCC) [I50.23] 02/08/2018    Drug-induced hyperglycemia [R73.9, T50.905A] 02/08/2018    Anemia [D64.9]     Abdominal aortic aneurysm (AAA) ([facility] [address] [I71.4] 02/06/2018    COPD exacerbation (HCC) [J44.1] 02/05/2018    Type 2 diabetes mellitus without complication, with long-term current use of insulin ([facility]) [E11.9, Z79.4]     Chronic kidney disease (CKD), stage IV (severe) ([facility] [address] [N18.4]      Plan:        Placement to inpatient rehab  - Discuss with

## 2018-02-21 NOTE — PLAN OF CARE
Problem: Discharge Planning:  Goal: Discharged to appropriate level of care  Discharged to appropriate level of care   Outcome: Ongoing  Discussing with the patient's wife and the patient in depth regarding placement for extended care facility. Problem: Activity Intolerance:  Goal: Ability to tolerate increased activity will improve  Ability to tolerate increased activity will improve   Outcome: Not Met This Shift  The patient continues to refuse physical therapy. Problem: Airway Clearance - Ineffective:  Goal: Ability to maintain a clear airway will improve  Ability to maintain a clear airway will improve   Outcome: Met This Shift  The patient remains on 3L nasal cannula and saturating well. Problem: Breathing Pattern - Ineffective:  Goal: Ability to achieve and maintain a regular respiratory rate will improve  Ability to achieve and maintain a regular respiratory rate will improve   Outcome: Ongoing  Patient at times gets short of breath and needs BiPAP support. Problem: Falls - Risk of  Goal: Absence of falls  Outcome: Met This Shift  The patient remained free from falls this shift, call light within reach, bed in locked and lowest position. Side rails up x2. Continue to monitor closely. Problem: Pain:  Goal: Pain level will decrease  Pain level will decrease   Outcome: Ongoing  Patient's pain has been well controlled throughout the entire shift, please see MAR. Problem: Risk for Impaired Skin Integrity  Goal: Tissue integrity - skin and mucous membranes  Structural intactness and normal physiological function of skin and  mucous membranes. Outcome: Met This Shift  The patient's skin remains dry and intact. Assist patient with turning Q2 hours and PRN. Patient has a pressure relief overlay placed on his mattress.

## 2018-02-21 NOTE — FLOWSHEET NOTE
02/21/18 1314   Provider Notification   Reason for Communication Evaluate   Provider Name Residents   Provider Notification Resident   Method of Communication Page   Response Waiting for response   Notification Time [phone]  (Per resident she wanted to be alerted when wife visiting.)     Dr. Neha English paged regarding the patient's wife visiting. Per resident who returned page it was stated that they were at didactics and would be up to round between 2-3. Patient's wife updated. Per wife she is a phone call away if they would like to speak with her.

## 2018-02-21 NOTE — PLAN OF CARE
Problem: Breathing Pattern - Ineffective:  Goal: Ability to achieve and maintain a regular respiratory rate will improve  Ability to achieve and maintain a regular respiratory rate will improve   Outcome: Ongoing  Patient exhibiting no signs of tachypnea or respiratory distress. Problem: Gas Exchange - Impaired:  Goal: Levels of oxygenation will improve  Levels of oxygenation will improve   Outcome: Ongoing  Patient maintaining oxygenation levels above 90% on 3L via nasal cannula and when on bipap. Problem: Falls - Risk of  Goal: Absence of falls  Outcome: Ongoing  Patient safety maintained. Bed locked and lowest position. Call light within reach. Hourly rounding performed. No falls or injuries. Will continue to monitor.

## 2018-02-21 NOTE — PROGRESS NOTES
Patient seen  Afebrile.  Vitals stable  Tunneled catheter site clean and dry  No issues with dialysis

## 2018-02-21 NOTE — PROGRESS NOTES
Pulmonary Progress Note  [facility]      Patient - Vanessa Rogers,  Age - 75 y.o.    - 1950      Room Number - 2098/2098-01   MRN -  [id number]   Acct # - [de-identified]  Date of Admission -  2018  7:29 PM        Consulting [address] [place] [address],[address], [place]  Primary Care Physician - Bill Means MD     SUBJECTIVE   No distress  Alert and interactive  See on HD  Denies increased dyspnea or chest pain  OBJECTIVE   VITALS    height is 5' 8\" (1.727 m) and weight is 118 lb 13.3 oz (53.9 kg). His oral temperature is 97.9 °F (36.6 °C). His blood pressure is 103/39 (abnormal) and his pulse is 96. His respiration is 17 and oxygen saturation is 97%. Body mass index is 18.07 kg/m². Temperature Range: Temp: 97.9 °F (36.6 °C) Temp  Av °F (36.7 °C)  Min: 97.7 °F (36.5 °C)  Max: 98.4 °F (36.9 °C)  BP Range:  Systolic (85VLF), QXU:113 , Min:91 , ZDW:275     Diastolic (73ZKW), NATHANIEL:75, Min:39, Max:67    Pulse Range: Pulse  Av  Min: 72  Max: 96  Respiration Range: Resp  Av.6  Min: 16  Max: 22  Current Pulse Ox[de-identified]  SpO2: 97 %  24HR Pulse Ox Range:  SpO2  Av.7 %  Min: 96 %  Max: 100 %  Oxygen Amount and Delivery: O2 Flow Rate (L/min): 3 L/min    Wt Readings from Last 3 Encounters:   18 118 lb 13.3 oz (53.9 kg)   16 133 lb (60.3 kg)   11/23/15 134 lb 3.2 oz (60.9 kg)       I/O (24 Hours)    Intake/Output Summary (Last 24 hours) at 18 1220  Last data filed at 18 1130   Gross per 24 hour   Intake             1340 ml   Output             1000 ml   Net              340 ml       EXAM     General Appearance  Awake, alert, oriented, in no acute distress  HEENT - normocephalic, atraumatic. Neck - Supple,  trachea midline   Lungs - coarse anteriorly and laterally  Heart Exam:PMI normal. No lifts, heaves, or thrills. RRR. No murmurs, clicks, gallops, or rubs  Abdomen Exam: Abdomen soft, non-tender.  BS normal  Extremity Exam: no edema    MEDS

## 2018-02-21 NOTE — FLOWSHEET NOTE
02/21/18 1022   Provider Notification   Reason for Communication Evaluate   Provider Name Dr. Apryl Giordano   Provider Notification Physician   Method of Communication Call   Response Waiting for response  (Regarding patient's bladder retention, awaiting return call.)   Notification Time  serve was utilized, however they stated to call the doctors office at [phone]. After the office was called they redirected this writer to [phone]. At this time that number is not being answered. Elizabeth Nguyen was able to page Dr. Betty Phalen, awaiting return call.

## 2018-02-21 NOTE — PROGRESS NOTES
Patient bladder scanned prior to transport to dialysis and found to have 200 in his bladder. At this time the patient reports no urge to urinate. Patient's blood sugar was 68 this morning therefore he was given a few items for breakfast prior to dialysis so that his blood glucose would not drop during treatment. Assessment complete, at this time the patient will be transported to dialysis.

## 2018-02-21 NOTE — PROGRESS NOTES
Strength  Gross RUE Strength: WFL  R Hand Grasp: 5/5   Right Hand Strength -  (lbs)  Handle Setting 2: 75#  (norms # )   RUE Tone: Normotonic     RUE AROM (degrees)  RUE AROM : WFL     Right Hand AROM (degrees)  Right Hand AROM: WFL    Fine Motor Skills  Coordination  Movements Are Fluid And Coordinated: No  Coordination and Movement description: Decreased accuracy, Decreased speed                      Left 9-Hole Peg Test: Functional  Right 9-Hole Peg Test: Functional    Mobility    To be assessed more functionally       Bed mobility  Rolling to Left: Minimal assistance  Rolling to Right: Minimal assistance            Assessment  Performance deficits / Impairments: Decreased functional mobility , Decreased ADL status, Decreased safe awareness, Decreased endurance, Decreased coordination  Treatment Diagnosis: Impaired ability to care for self    Prognosis: Fair, Good  Decision Making: Medium Complexity  History: Moderate chart review and interview or patient and spouse   Exam: 5 areas of altered / impiared performance   Assistance / Modification: assist for care tasks   Patient Education: Therapy POC, conditioning, safety,   REQUIRES OT FOLLOW UP: Yes  Discharge Recommendations: [address] [facility], 24 hour supervision or assist  Activity Tolerance: Patient limited by fatigue, Patient limited by pain  Activity Tolerance: O2 sats 82% on 4 lpm per nasal cannula          G CODE  OT G-codes  Functional Assessment Tool Used: AM PAC Basic Cares   Score: 10  Functional Limitation: Self care  Self Care Current Status (): At least 60 percent but less than 80 percent impaired, limited or restricted  Self Care Goal Status ():  At least 40 percent but less than 60 percent impaired, limited or restricted    Goals  Patient Goals   Patient goals : regain abiltiy to care for self   Short term goals  Time Frame for Short term goals: 2-5 days   Short term goal 1: Tolerate 10-25 minutes of general

## 2018-02-21 NOTE — CARE COORDINATION
ONGOING DISCHARGE PLAN:     Per LSW notes from yesterday, he spoke with pt's spouse who REFUSES for patient to be discharged to SNF. LSW still following for outpatient HD slot. Pt is agreeable to [facility] as long as spouse is. Will need to speak with spouse regarding this. Pt is on po Zyvox and po steroids. Had urinary retention issues again, and diggs cath had to be reinserted. Will continue to follow.     Electronically signed by Bernarda Lozano RN on 2/21/2018 at 11:51 AM

## 2018-02-21 NOTE — PROGRESS NOTES
48*   CREATININE  3.36*  2.37*  3.13*   LABGLOM  18*  27*  20*   GFRAA  22*  33*  24*         Assessment/Plan:     1. Acute kidney injury superimposed on chronic kidney disease stage IV - will continue to monitor closely. Renal function is worseningWith holding of dialysis. It appears this gentleman would benefit from chronic hemodialysis given underlying chronic kidney disease stage IV and systolic heart failure with ejection fraction 20-25%. Dialysis M/W/F. Consult social service to arrange for outpatient dialysis spot. 2. Dyspnea - secondary to acute exacerbation of chronic obstructive pulmonary disease as well as fluid overload secondary to decompensated systolic heart failure. Will monitor response to ultrafiltration. SOB related to AECOPD/fluid overload/AcuteDecompensated CHF with elevated BNP/renal failure     3. Anemia of chronic kidney disease - iron studies consistent with iron deficiency (Ferritin 153 and iron saturation 13%). Completed course of Venofer IV  Aranesp 100 mcg weekly.     4.  Cachexia with active smoking hx-r/o malignancy/multiple nodules on chest CT.     5.  Proteinuria (5 grams) -Secondary to diabetic nephropathy. 6. Hypertension - Controlled. 7.Acute on chronic CHF-Not on ACE-I or ELENA due to renal failure -however as he is now on chronic dialysis-low dose lisinopril will be considered  8. Urinary retention, reinserted Manzanares catheter. Plan:  Continue HD MWF  Target weight 55 kg. Discharge planning in progress. No objections on discharge  once he has a confirmation from outpatient dialysis unit and also once cleared by pulmonary.       Anjali PENA.  Attending Nephrologist

## 2018-02-21 NOTE — CARE COORDINATION
Spoke with Urbano Alford, clin lead, who is concerned that pt has been using bipap here and only has home 02 and neb. She asked that we follow for trilogy. Left message for Oseas Sutherland from [facility] to follow for possible trilogy for patient. Electronically signed by Dima Encarnacion RN on 2/21/2018 at 4:09 PM    Oseas Sutherland returned call and will pull information for trilogy tomorrow morning.     Electronically signed by Dima Encarnacion RN on 2/21/2018 at 4:58 PM

## 2018-02-21 NOTE — PROGRESS NOTES
Bipap on standby at this time. Pulse Ox-93%-3lpm   BS-diminished    Skin score 0      Nasal gel pad available at bedside. Pt awake/alert/no distress noted.

## 2018-02-21 NOTE — PROGRESS NOTES
Pt placed on Bipap due to decreased SpO2 86%- lethargy with increased WOB. Aerosol tx given inline.   SpO2 93% on Bipap  Electronically signed by Tommie lucas RCP on 2/21/2018 at 3:20 PM

## 2018-02-22 ENCOUNTER — APPOINTMENT (OUTPATIENT)
Dept: MRI IMAGING | Age: 68
DRG: 673 | End: 2018-02-22
Payer: COMMERCIAL

## 2018-02-22 LAB
ANION GAP SERPL CALCULATED.3IONS-SCNC: 9 MMOL/L (ref 9–17)
BUN BLDV-MCNC: 28 MG/DL (ref 8–23)
BUN/CREAT BLD: ABNORMAL (ref 9–20)
CALCIUM SERPL-MCNC: 8.1 MG/DL (ref 8.6–10.4)
CHLORIDE BLD-SCNC: 100 MMOL/L (ref 98–107)
CO2: 28 MMOL/L (ref 20–31)
CREAT SERPL-MCNC: 2 MG/DL (ref 0.7–1.2)
GFR AFRICAN AMERICAN: 40 ML/MIN
GFR NON-AFRICAN AMERICAN: 33 ML/MIN
GFR SERPL CREATININE-BSD FRML MDRD: ABNORMAL ML/MIN/{1.73_M2}
GFR SERPL CREATININE-BSD FRML MDRD: ABNORMAL ML/MIN/{1.73_M2}
GLUCOSE BLD-MCNC: 122 MG/DL (ref 75–110)
GLUCOSE BLD-MCNC: 125 MG/DL (ref 75–110)
GLUCOSE BLD-MCNC: 142 MG/DL (ref 75–110)
GLUCOSE BLD-MCNC: 192 MG/DL (ref 75–110)
GLUCOSE BLD-MCNC: 66 MG/DL (ref 70–99)
GLUCOSE BLD-MCNC: 68 MG/DL (ref 75–110)
HCT VFR BLD CALC: 26.2 % (ref 41–53)
HEMOGLOBIN: 8.3 G/DL (ref 13.5–17.5)
MCH RBC QN AUTO: 32.6 PG (ref 26–34)
MCHC RBC AUTO-ENTMCNC: 31.6 G/DL (ref 31–37)
MCV RBC AUTO: 103.2 FL (ref 80–100)
NRBC AUTOMATED: ABNORMAL PER 100 WBC
PDW BLD-RTO: 18.8 % (ref 11.5–14.9)
PLATELET # BLD: 109 K/UL (ref 150–450)
PMV BLD AUTO: 8.1 FL (ref 6–12)
POTASSIUM SERPL-SCNC: 4.3 MMOL/L (ref 3.7–5.3)
RBC # BLD: 2.54 M/UL (ref 4.5–5.9)
SODIUM BLD-SCNC: 137 MMOL/L (ref 135–144)
WBC # BLD: 6.8 K/UL (ref 3.5–11)

## 2018-02-22 PROCEDURE — S0028 INJECTION, FAMOTIDINE, 20 MG: HCPCS | Performed by: STUDENT IN AN ORGANIZED HEALTH CARE EDUCATION/TRAINING PROGRAM

## 2018-02-22 PROCEDURE — 80048 BASIC METABOLIC PNL TOTAL CA: CPT

## 2018-02-22 PROCEDURE — 2060000000 HC ICU INTERMEDIATE R&B

## 2018-02-22 PROCEDURE — 6370000000 HC RX 637 (ALT 250 FOR IP): Performed by: FAMILY MEDICINE

## 2018-02-22 PROCEDURE — 94640 AIRWAY INHALATION TREATMENT: CPT

## 2018-02-22 PROCEDURE — 2500000003 HC RX 250 WO HCPCS: Performed by: STUDENT IN AN ORGANIZED HEALTH CARE EDUCATION/TRAINING PROGRAM

## 2018-02-22 PROCEDURE — 6370000000 HC RX 637 (ALT 250 FOR IP): Performed by: STUDENT IN AN ORGANIZED HEALTH CARE EDUCATION/TRAINING PROGRAM

## 2018-02-22 PROCEDURE — 6360000002 HC RX W HCPCS: Performed by: INTERNAL MEDICINE

## 2018-02-22 PROCEDURE — 94660 CPAP INITIATION&MGMT: CPT

## 2018-02-22 PROCEDURE — 6370000000 HC RX 637 (ALT 250 FOR IP): Performed by: INTERNAL MEDICINE

## 2018-02-22 PROCEDURE — 97530 THERAPEUTIC ACTIVITIES: CPT

## 2018-02-22 PROCEDURE — 94762 N-INVAS EAR/PLS OXIMTRY CONT: CPT

## 2018-02-22 PROCEDURE — 2580000003 HC RX 258: Performed by: EMERGENCY MEDICINE

## 2018-02-22 PROCEDURE — 6370000000 HC RX 637 (ALT 250 FOR IP): Performed by: NURSE PRACTITIONER

## 2018-02-22 PROCEDURE — 51702 INSERT TEMP BLADDER CATH: CPT

## 2018-02-22 PROCEDURE — 6360000002 HC RX W HCPCS: Performed by: FAMILY MEDICINE

## 2018-02-22 PROCEDURE — 36415 COLL VENOUS BLD VENIPUNCTURE: CPT

## 2018-02-22 PROCEDURE — 82947 ASSAY GLUCOSE BLOOD QUANT: CPT

## 2018-02-22 PROCEDURE — 97110 THERAPEUTIC EXERCISES: CPT

## 2018-02-22 PROCEDURE — 2580000003 HC RX 258: Performed by: NURSE PRACTITIONER

## 2018-02-22 PROCEDURE — 99232 SBSQ HOSP IP/OBS MODERATE 35: CPT | Performed by: PAIN MEDICINE

## 2018-02-22 PROCEDURE — 85027 COMPLETE CBC AUTOMATED: CPT

## 2018-02-22 RX ORDER — PREGABALIN 50 MG/1
50 CAPSULE ORAL 2 TIMES DAILY
Status: DISCONTINUED | OUTPATIENT
Start: 2018-02-22 | End: 2018-02-27 | Stop reason: HOSPADM

## 2018-02-22 RX ORDER — SODIUM PHOSPHATE, DIBASIC AND SODIUM PHOSPHATE, MONOBASIC 7; 19 G/133ML; G/133ML
1 ENEMA RECTAL
Status: ACTIVE | OUTPATIENT
Start: 2018-02-22 | End: 2018-02-22

## 2018-02-22 RX ADMIN — OXYCODONE HYDROCHLORIDE AND ACETAMINOPHEN 1 TABLET: 5; 325 TABLET ORAL at 10:16

## 2018-02-22 RX ADMIN — ISOSORBIDE MONONITRATE 30 MG: 30 TABLET, EXTENDED RELEASE ORAL at 07:40

## 2018-02-22 RX ADMIN — LINEZOLID 600 MG: 600 TABLET, FILM COATED ORAL at 21:55

## 2018-02-22 RX ADMIN — OXYCODONE HYDROCHLORIDE 5 MG: 5 TABLET ORAL at 21:44

## 2018-02-22 RX ADMIN — CARVEDILOL 6.25 MG: 6.25 TABLET, FILM COATED ORAL at 18:23

## 2018-02-22 RX ADMIN — LINEZOLID 600 MG: 600 TABLET, FILM COATED ORAL at 13:36

## 2018-02-22 RX ADMIN — Medication 2 PUFF: at 07:40

## 2018-02-22 RX ADMIN — TAMSULOSIN HYDROCHLORIDE 0.4 MG: 0.4 CAPSULE ORAL at 07:50

## 2018-02-22 RX ADMIN — IPRATROPIUM BROMIDE AND ALBUTEROL SULFATE 1 AMPULE: .5; 3 SOLUTION RESPIRATORY (INHALATION) at 07:33

## 2018-02-22 RX ADMIN — Medication 10 ML: at 07:45

## 2018-02-22 RX ADMIN — OXYCODONE HYDROCHLORIDE AND ACETAMINOPHEN 1 TABLET: 5; 325 TABLET ORAL at 01:29

## 2018-02-22 RX ADMIN — PREGABALIN 50 MG: 50 CAPSULE ORAL at 21:55

## 2018-02-22 RX ADMIN — FAMOTIDINE 10 MG: 10 INJECTION, SOLUTION INTRAVENOUS at 07:40

## 2018-02-22 RX ADMIN — Medication 2 PUFF: at 21:44

## 2018-02-22 RX ADMIN — DARBEPOETIN ALFA 100 MCG: 100 INJECTION, SOLUTION INTRAVENOUS; SUBCUTANEOUS at 13:37

## 2018-02-22 RX ADMIN — PREGABALIN 25 MG: 25 CAPSULE ORAL at 07:50

## 2018-02-22 RX ADMIN — HEPARIN SODIUM 5000 UNITS: 5000 INJECTION, SOLUTION INTRAVENOUS; SUBCUTANEOUS at 21:44

## 2018-02-22 RX ADMIN — OXYCODONE HYDROCHLORIDE AND ACETAMINOPHEN 1 TABLET: 5; 325 TABLET ORAL at 17:47

## 2018-02-22 RX ADMIN — OXYCODONE HYDROCHLORIDE 5 MG: 5 TABLET ORAL at 17:47

## 2018-02-22 RX ADMIN — OXYCODONE HYDROCHLORIDE 5 MG: 5 TABLET ORAL at 10:16

## 2018-02-22 RX ADMIN — OXYCODONE HYDROCHLORIDE AND ACETAMINOPHEN 1 TABLET: 5; 325 TABLET ORAL at 05:34

## 2018-02-22 RX ADMIN — OXYCODONE HYDROCHLORIDE 5 MG: 5 TABLET ORAL at 01:29

## 2018-02-22 RX ADMIN — PREDNISONE 10 MG: 10 TABLET ORAL at 07:40

## 2018-02-22 RX ADMIN — OXYCODONE HYDROCHLORIDE AND ACETAMINOPHEN 1 TABLET: 5; 325 TABLET ORAL at 21:43

## 2018-02-22 RX ADMIN — Medication 10 ML: at 21:51

## 2018-02-22 RX ADMIN — HEPARIN SODIUM 5000 UNITS: 5000 INJECTION, SOLUTION INTRAVENOUS; SUBCUTANEOUS at 07:50

## 2018-02-22 RX ADMIN — IPRATROPIUM BROMIDE AND ALBUTEROL SULFATE 1 AMPULE: .5; 3 SOLUTION RESPIRATORY (INHALATION) at 15:43

## 2018-02-22 RX ADMIN — HYDRALAZINE HYDROCHLORIDE 50 MG: 50 TABLET, FILM COATED ORAL at 06:05

## 2018-02-22 RX ADMIN — IPRATROPIUM BROMIDE AND ALBUTEROL SULFATE 1 AMPULE: .5; 3 SOLUTION RESPIRATORY (INHALATION) at 19:42

## 2018-02-22 RX ADMIN — DOCUSATE SODIUM 200 MG: 100 CAPSULE, LIQUID FILLED ORAL at 07:40

## 2018-02-22 RX ADMIN — FENTANYL CITRATE 25 MCG: 50 INJECTION INTRAMUSCULAR; INTRAVENOUS at 07:35

## 2018-02-22 RX ADMIN — HYDRALAZINE HYDROCHLORIDE 50 MG: 50 TABLET, FILM COATED ORAL at 21:44

## 2018-02-22 RX ADMIN — CARVEDILOL 6.25 MG: 6.25 TABLET, FILM COATED ORAL at 07:40

## 2018-02-22 RX ADMIN — HYDRALAZINE HYDROCHLORIDE 50 MG: 50 TABLET, FILM COATED ORAL at 13:36

## 2018-02-22 RX ADMIN — OXYCODONE HYDROCHLORIDE 5 MG: 5 TABLET ORAL at 05:34

## 2018-02-22 ASSESSMENT — ENCOUNTER SYMPTOMS
ABDOMINAL PAIN: 1
SHORTNESS OF BREATH: 1
COUGH: 1

## 2018-02-22 ASSESSMENT — PAIN SCALES - GENERAL
PAINLEVEL_OUTOF10: 8
PAINLEVEL_OUTOF10: 6
PAINLEVEL_OUTOF10: 8
PAINLEVEL_OUTOF10: 5
PAINLEVEL_OUTOF10: 9
PAINLEVEL_OUTOF10: 7
PAINLEVEL_OUTOF10: 4
PAINLEVEL_OUTOF10: 6
PAINLEVEL_OUTOF10: 8

## 2018-02-22 ASSESSMENT — PAIN DESCRIPTION - FREQUENCY: FREQUENCY: CONTINUOUS

## 2018-02-22 ASSESSMENT — PAIN DESCRIPTION - PAIN TYPE
TYPE: CHRONIC PAIN;NEUROPATHIC PAIN
TYPE: CHRONIC PAIN
TYPE: CHRONIC PAIN

## 2018-02-22 ASSESSMENT — PAIN DESCRIPTION - ONSET: ONSET: ON-GOING

## 2018-02-22 ASSESSMENT — PAIN DESCRIPTION - DESCRIPTORS: DESCRIPTORS: ACHING;BURNING

## 2018-02-22 ASSESSMENT — PAIN DESCRIPTION - LOCATION
LOCATION: BACK;FOOT
LOCATION: GENERALIZED;BACK
LOCATION: BACK;GENERALIZED

## 2018-02-22 NOTE — PLAN OF CARE
Problem: Discharge Planning:  Intervention: Assess readiness for discharge  Patient is short of breath. Continuous oxygen and bipap with sleep. Problem: Falls - Risk of  Goal: Absence of falls  Outcome: Ongoing  No falls this shift. Patient is alert and oriented. Call light is within reach.

## 2018-02-22 NOTE — PROGRESS NOTES
History:   Procedure Laterality Date    ABDOMINAL AORTIC ANEURYSM REPAIR      LA INSERT NON-TUNNEL CV CATH Right 2/19/2018    CATHETER INSERTION HEMODIALYSIS performed by Arturo Gonzalez DO at [address]  Restrictions/Precautions  Restrictions/Precautions: Fall Risk, General Precautions, Contact Precautions  Subjective   General  Chart Reviewed: Yes  Family / Caregiver Present: No  Subjective  Subjective: Pt in bed upon arrival. Pt agreeable to PT. General Comment  Comments: RN Chevy lind's pt for PT. Pain Screening  Patient Currently in Pain: Yes  Pain Assessment  Pain Assessment: 0-10  Vital Signs  Patient Currently in Pain: Yes       Orientation     Objective   Bed mobility  Rolling to Left: Minimal assistance  Rolling to Right: Minimal assistance  Supine to Sit: Minimal assistance  Sit to Supine: Moderate assistance  Scooting: Minimal assistance  Comment: Pt requires vc's for technique. Pt requires assistance with BLE's for sit to supine. Pt required PCT and writer to assist him with cleaning up after writer found pt laying in stool. Transfers  Comment: NT   Ambulation  Ambulation?: No     Balance  Posture: Fair  Sitting - Static: Fair  Sitting - Dynamic: Fair;-  Comments: sitting balance assessed sitting on EOB. Exercises  Comments: Pt dangled for 10 min while completing BUE dynamic activities and BLE ex's. Pt demos ataxic movements and demos kody lateral leans and posterior leans with ex's. Pt able to sit back up from lean with CGA. Completed to increase sitting balance and strength. Assessment   Body structures, Functions, Activity limitations: Decreased functional mobility ; Decreased strength;Decreased endurance  Assessment: Pt dangled 10 min on EOB while completing dynamic balance ex's. Pt demos decrease sitting balance with CGA. Patient Education: poc  REQUIRES PT FOLLOW UP: Yes  Activity Tolerance  Activity Tolerance: Patient limited by pain; Patient limited by endurance; Patient limited by fatigue       Discharge Recommendations:  Subacute/Skilled Nursing Facility    G-Code     OutComes Score    AM-PAC Score    Goals  Short term goals  Time Frame for Short term goals: 7-10 days  Short term goal 1: Bed mobility with min assist  Short term goal 2: Transfers with min assist  Short term goal 3: Gait with RW/Andrea x 10-20ft  Patient Goals   Patient goals : to walk    Plan    Plan  Times per week: 5-7x/wk  Times per day: Daily  Current Treatment Recommendations: Strengthening, ROM, Functional Mobility Training, Transfer Training  Safety Devices  Type of devices:  All fall risk precautions in place, Bed alarm in place, Call light within reach, Patient at risk for falls, Left in bed, Nurse notified     Therapy Time   Individual Concurrent Group Co-treatment   Time In 1101         Time Out 1124         Minutes [place], [place]

## 2018-02-22 NOTE — PLAN OF CARE
Problem: Airway Clearance - Ineffective:  Goal: Ability to maintain a clear airway will improve  Ability to maintain a clear airway will improve   Outcome: Not Met This Shift  Patient maintaining adequate oxygenation on 3L nasal cannula. Patient also on bipap for part of evening. Problem: Gas Exchange - Impaired:  Goal: Levels of oxygenation will improve  Levels of oxygenation will improve   Outcome: Ongoing  Patient maintaining oxygenation above 90% on 3L nasal cannula. Problem: Falls - Risk of  Goal: Absence of falls  Outcome: Ongoing  Patient safety maintained. Bed locked and lowest position. Call light within reach. Hourly rounding performed. No falls or injuries. Will continue to monitor. Problem: Pain:  Goal: Pain level will decrease  Pain level will decrease   Outcome: Ongoing  Patient receiving PRN pain medications for chronic back pain.

## 2018-02-22 NOTE — PROGRESS NOTES
bilaterally.   Neurological: He is alert and oriented to person, place, and time. Skin: Skin is warm and dry. Skin over lower extremities has 'burning' sensation upon palpation bilaterally up to the level of the knee No discoloration or swelling.   Psychiatric: His behavior is normal. Thought content normal.                  DATA:  Old records have not been requested     IMPRESSION  Bilateral neuropathic pain of uncertain etiology     RECOMMENDATIONS: per Dr Juan Carlos Lima  Will discuss with Dr Nanette Uribe  Will continue to follow

## 2018-02-22 NOTE — PROGRESS NOTES
Pain Management Inpatient Progress Note      Patient: Fabiola Lopez    Location: 1210/9850-14    CHIEF COMPLAINT:  Pain in the legs bilaterally    HISTORY OF PRESENT ILLNESS:    The patient is a 75 y.o. male who is being seen  for bilateral leg pain. atient is still complaining of pain in his feet bilaterally. Wrist started him on Lyrica 25 mg a twice a day. Patient reports in the past he had severe reaction to Neurontin was unable to tolerate it. He just had the dialysis and feels tired and drowsy. Overall his pain is essentially unchanged. He also has a history of COPD, CHF abdominal aortic aneurysm repair complicated by bowel perforation and chronic back and abdominal pain. As a result he is chronic opioid therapy with percocet 1 tablet every 4 hours. Patient's pain is the severe and is interfering with activities of daily living. He is unable to walk because of the pain.             Past Medical History:        Diagnosis Date    Abdominal pain, chronic, generalized     Cervicodynia     Chronic back pain     Chronic kidney disease     secondary to renal vascular disease    Cigarette smoker     COPD (chronic obstructive pulmonary disease) ([address]     DDD (degenerative disc disease), lumbar     Depression     Family history of cancer     Hernia, ventral     Hyperlipidemia     Iron deficiency anemia     Lichen simplex chronicus     PUD (peptic ulcer disease)     Renal cyst     Type II or unspecified type diabetes mellitus without mention of complication, not stated as uncontrolled     Vitamin D deficiency        Past Surgical History:        Procedure Laterality Date    ABDOMINAL AORTIC ANEURYSM REPAIR      CA INSERT NON-TUNNEL CV CATH Right 2/19/2018    CATHETER INSERTION HEMODIALYSIS performed by Vita Stanton DO at [facility] Medications:   Prior to Admission medications    Medication Sig Start Date End Date Taking?  Authorizing Provider   carvedilol (COREG) 6.25 MG tablet Take 6.25 mg

## 2018-02-22 NOTE — PROGRESS NOTES
[address]   OCCUPATIONAL THERAPY MISSED TREATMENT NOTE   INPATIENT   Date: 18  Patient Name: Dona Calloway       Room: 1466/5212-84  MRN: [id number]   Account #: [de-identified]    : 1950  (76 y.o.)  Gender: male   Referring Practitioner: Dr Claudean Slicker   Diagnosis: COPD - Acute on Chronic REpsiratory failure  plus Acute Renal failure with dialysis              REASON FOR MISSED TREATMENT:  Patient refusal   -   Other - Pt refusing. States, \"Um. .. I was sleeping when you came in\". Pt declined A to respsition and dismissed writer from room. [facility]

## 2018-02-22 NOTE — PROGRESS NOTES
lungs daily.       Yes Historical Provider, MD   levalbuterol (XOPENEX HFA) 45 MCG/ACT inhaler Inhale 1-2 puffs into the lungs every 4 hours as needed.       Yes Historical Provider, MD            Allergies:  Codeine and Contrast [iodides]     Social History:  Social History   Social History            Social History    Marital status:        Spouse name: N/A    Number of children: N/A    Years of education: N/A          Occupational History    Not on file.            Social History Main Topics    Smoking status: Current Every Day Smoker       Packs/day: 2.00       Types: Cigarettes    Smokeless tobacco: Not on file    Alcohol use No    Drug use: No    Sexual activity: Not on file           Other Topics Concern    Not on file          Social History Narrative    No narrative on file            Family History:   Family History   History reviewed. No pertinent family history.        REVIEW OF SYSTEMS:  Review of Systems   Constitutional: Negative. HENT: Negative. Respiratory: Positive for cough and shortness of breath. Gastrointestinal: Positive for abdominal pain. Genitourinary:        On dialysis   Skin: Negative. Neurological:        Burning feet and legs            PHYSICAL EXAM:  Vitals:    02/22/18 0555   BP: (!) 129/39   Pulse: 80   Resp: 16   Temp: 98.4 °F (36.9 °C)   SpO2: 92%        Physical Exam   Constitutional: He is oriented to person, place, and time. Elderly, frail but alert and engaging gentleman in no obvious painful or respiratory distress receiving oxygen via nasal cannula. HENT:   Head: Normocephalic. Eyes: EOM are normal. Pupils are equal, round, and reactive to light. Cardiovascular: Normal rate. Exam reveals no gallop and no friction rub. Murmur heard. Pulmonary/Chest: Effort normal and breath sounds normal. No respiratory distress. He has coarse breath sounds. Abdominal: Bowel sounds are normal. He exhibits distension. There is tenderness.  Scar over adomen  Musculoskeletal:   Had gross movement of lower extremities bilaterally. Neurological: He is alert and oriented to person, place, and time. Skin: Skin is warm and dry. Skin over lower extremities has 'burning' sensation upon palpation bilaterally up to the level of the knee No discoloration or swelling. Psychiatric: His behavior is normal. Thought content normal.                 DATA:  Old records have not been requested     IMPRESSION  Bilateral neuropathic pain of uncertain etiology still persists despite Pregabalin 25 mg bid     RECOMMENDATIONS: per Dr Wiley Arthur  Increase to Pregabalin 50 mg bid  Will continue to follow    Cristhian Chapa MD  Palliative Care Fellow (On Pain Management Elective)           NAME:  Shanel Culver  MRN: [id number]   YOB: 1950   Date: 2/23/2018   Age: 75 y.o. Gender: male       Shanel Culver is 75 y.o. ,male, referred because of Shortness of Breath        I Performed a history and physical examination of the patient and discussed management with the resident/ Physician Assistant/ Nurse Practitioner. I reviewed the Resident/ Physician Assistant/ Nurse Practitioner note and agree with the documented findings and plan of care. Any areas of disagreement are noted on the chart. I was present for any key portions of any procedure. I have documented in the chart those procedures where I was not present during key Portions. I agree with the chief complaint, past medical history, past surgical history, Social & family history, Allergies, medications as documented unless noted below. I have personally evaluated this patient and have completed at least one if not all key elements of the (History, physical exam and plan of care). Additional findings are added to the note above    Diagnosis:   1. Neuropathy ([address]    2. COPD exacerbation ([facility] [address]    3. Gastrointestinal hemorrhage, unspecified gastrointestinal hemorrhage type    4.  Anemia, unspecified type        Plan

## 2018-02-22 NOTE — PLAN OF CARE
Problem: Nutrition  Goal: Optimal nutrition therapy  Outcome: Ongoing  Nutrition Problem: Inadequate oral intake  Intervention: Food and/or Nutrient Delivery: Continue current diet, Modify current ONS  Nutritional Goals: Adequate nutrition intake or provision

## 2018-02-22 NOTE — PROGRESS NOTES
popliteal and  femoral, popliteal, posterior tibial  posterior tibial arteries. and peroneal arteries. Occluded femoral to femoral artery  Occluded femoral to femoral artery  bypass graft. bypass graft. Velocities are measured in cm/s ; Diameters are measured in mm LE Duplex Measurements +------------++-----+-----+--------+--------++----+-----+--------+---------+ ! ! !Right! ! Left    !        !!    !     !        !         ! +------------++-----+-----+--------+--------++----+-----+--------+---------+ ! Location    ! !PSV  ! Ratio! Wave    ! Diameter!!PSV ! Ratio! Wave    ! Diameter ! !            !! !     !Paulett Vanita.   !        !!    ! !Desc.   !         ! +------------++-----+-----+--------+--------++----+-----+--------+---------+ ! Dist EIA    !!22.3 !     !        !6       !!87.6!     !        !5        ! +------------++-----+-----+--------+--------++----+-----+--------+---------+ ! Common      ! !21.4 !     !        !9       !!40.3!     !        !6        ! ! Femoral     !!     !     !        !        !!    !     !        !         ! +------------++-----+-----+--------+--------++----+-----+--------+---------+ ! PFA         !!17   !0.79 ! !5       !!15.8!0.39 !        !7        ! +------------++-----+-----+--------+--------++----+-----+--------+---------+ ! Prox SFA    !!26   !     !        !7       !!15.3!     !        !15       ! +------------++-----+-----+--------+--------++----+-----+--------+---------+ ! Mid SFA     !!39.7 !1.53 ! !5       !!36  !2.35 !        !5        ! +------------++-----+-----+--------+--------++----+-----+--------+---------+ ! Dist SFA    !!21.5 !0.54 !        !        !!29.9!0.83 ! !5        ! +------------++-----+-----+--------+--------++----+-----+--------+---------+ ! Prox        !!22.3 !1.04 !        !7       !!17.9!0.6  !         !6        ! !Popliteal   !!     !     !        !        !!    !     !        !         ! +------------++-----+-----+--------+--------++----+-----+--------+---------+ ! Dist        !!19.7 !0.88 !        !6       !!15.1!0.84 !        !7        ! !Popliteal   !!     !     !        !        !!    !     !        !         ! +------------++-----+-----+--------+--------++----+-----+--------+---------+ ! Mid PTA     !!39.8 !2.02 !        !        !!37.6!2.49 !        !         ! +------------++-----+-----+--------+--------++----+-----+--------+---------+ ! Mid Peroneal!!     !     !        !        !!34.3!2.27 !        !         ! +------------++-----+-----+--------+--------++----+-----+--------+---------+    Vl Lower Extremity Bilateral Venous Duplex    Result Date: 2/13/2018    [facility]  Vascular Lower Extremities DVT Study Procedure   Patient Name   [facility] Date of Study           02/13/2018                 C   Date of Birth  1950  Gender                  Male   Age            75 year(s)  Race                    Other   Room Number    2007        Height:                 67.72 inch, 172 cm   Corporate ID # [id number]  Weight:                 128 pounds, 58 kg   Patient Acct # [de-identified]   BSA:        1.69 m^2    BMI:      19.61 kg/m^2   MR #           [id number]      Sonographer             Sourav El, T   Accession #    [id number]   Interpreting Physician  Estelle Looney   Referring                  Referring Physician     Rashad Arnold  Nurse  Practitioner  Procedure Type of Study:   Veins: Lower Extremities DVT Study, Venous Scan Lower Bilateral.  Indications for Study:R/O DVT. Patient Status: In Patient. Technical Quality:Limited visualization. Comments:Exam performed by [address] [place] [address] 13. Conclusions   Summary   No evidence of superficial or deep venous thrombosis in both lower  extremities. Technically limited visualization.    Signature   ----------------------------------------------------------------  Electronically signed by IKE Lozano +------------------------------------+----------+---------------+----------+ ! SSV                                 ! Yes       ! Yes            ! None      ! +------------------------------------+----------+---------------+----------+    Fluoro For Surgical Procedures    Result Date: 2/19/2018  Radiology exam is complete. No Radiologist dictation. Please follow up with ordering provider. Vl Arterial Pvr Lower    Result Date: 2/15/2018    [facility]  Vascular Lower Arterial Plethysmography Procedure   Patient Name   [facility] Date of Study           02/15/2018                 C   Date of Birth  1950  Gender                  Male   Age            75 year(s)  Race                    Other   Room Number    2007        Height:                 67.72 inch, 172 cm   Corporate ID # [id number]  Weight:                 128 pounds, 58 kg   Patient Acct # [de-identified]   BSA:        1.69 m^2    BMI:      19.61 kg/m^2   MR #           [id number]      Sonographer             Casper Amador   Accession #    [id number]   Interpreting Physician  Michael Naranjo   Referring                  Referring Physician     Elijah Schneider  Nurse  Practitioner  Procedure Type of Study:   Extremities Arteries: Lower Arterial Plethysmography, PVR Lower. Indications for Study:Absent pulses. Patient Status: In Patient. Technical Quality:Adequate visualization. Comments: Normal (0.95-1.3) Mild vascular insufficiency (0.7-0.94) Moderate vascular insufficiency (0.5-0.69) Severe vascular insufficiency (0.3-0.49) Critical Ischemia (0.1-0.29) Non-diagnostic due to calcification (>1.3) Toe pressure <40 mmHg poor wound healing potential  Conclusions   Summary   Bilateral lower extremity ABIs and PVRs ( with toe pressures ) were  performed for the evaluation of peripheral vascular disease. Study  demonstrates:   ABNORMAL STUDY   Right:  NICO and TBI demonstrate critical limb ischemia.   Multilevel disease involving aorto-iliac and tibial arteries. Left:  NICO and TBI demonstrate critical limb ischemia at rest.  Involving the aorto-iliac system. Signature   ----------------------------------------------------------------  Electronically signed by Casper Amador(Sonographer) on  02/15/2018 03:15 PM  ----------------------------------------------------------------   ----------------------------------------------------------------  Electronically signed by Ruthanna Fothergill, Mohammed(Interpreting  physician) on 02/15/2018 04:37 PM  ----------------------------------------------------------------  Findings:   Right Impression:                    Left Impression:  Decreased amplitude, monophasic      Decreased amplitude, monophasic  waveform at the thigh, calf and      waveform at the thigh, calf and ankle  ankle levels. levels. Decreased amplitude digit waveform. Decreased amplitude digit waveform. Significant pressure reduction       Significant pressure reduction  between the brachial cuff and thigh  between the brachial cuff and thigh  cuff suggesting aorto-iliac disease. cuff suggesting aorto-iliac disease. Significant pressure reduction       NICO demonstrates critical limb  between the calf and ankle cuffs     ischemia.  suggesting tibial artery disease. TBI demonstrates critical limb  NICO demonstrates critical limb       ischemia. ischemia. TBI demonstrates critical limb  ischemia. Velocities are measured in cm/s ; Diameters are measured in mm Pressures +--------------------------------------++--------+-----+----+--------+-----+ ! ! !Right   ! ! Left!        !     ! +--------------------------------------++--------+-----+----+--------+-----+ ! Location                              ! !Pressure! Ratio! !Pressure! Ratio! +--------------------------------------++--------+-----+----+--------+-----+ ! Thigh                                 !!48      !0.35 ! !Pressure       ! Ratio       ! Notch       ! Amplitude       ! +---------------+---------------+------------+------------+----------------+ ! Calf           !49             !0.36        !            !                ! +---------------+---------------+------------+------------+----------------+ ! Ankle PT       !33             !0.24        !            !                ! +---------------+---------------+------------+------------+----------------+ ! Ankle DP       !33             !0.24        !            !                ! +---------------+---------------+------------+------------+----------------+   - Left Brachial Pressure:138. - Left NICO:0.24. Plethysmographic Digit Evaluation +---------++--------+-----+---------------++--------+-----+----------------+ ! ! !Right   ! ! Left           !!        !     !                ! +---------++--------+-----+---------------++--------+-----+----------------+ ! Location ! !Pressure! Ratio! PPG Wave Form  ! !Pressure! Ratio! PPG Wave Form   ! +---------++--------+-----+---------------++--------+-----+----------------+ ! Great Toe!!20      !0.14 !               !!21      !0.15 !                ! +---------++--------+-----+---------------++--------+-----+----------------+        Physical Examination:        Physical Exam   Constitutional: He is oriented to person, place, and time. Vital signs are normal. He appears malnourished. He appears unhealthy. HENT:   Head: Normocephalic and atraumatic. Neck: Normal range of motion. Cardiovascular: Normal rate, regular rhythm and normal heart sounds. Pulmonary/Chest: Effort normal. He has decreased breath sounds (Inspiratory Crackles, faint). Abdominal: Bowel sounds are normal. There is generalized tenderness. Large Ventral Hernia, Multiple Surgical Scars   Neurological: He is alert and oriented to person, place, and time. GCS score is 15. Skin: Skin is warm and dry.    Musculoskeletal: Significant weakness of RLL, concerning Chest  -Smoking hx, 40 pack years  -Increasing lymphadenopathy since Jan 2016  -Outpatient follow up with kee Ohara MD  2/22/2018  1:48 PM

## 2018-02-23 ENCOUNTER — APPOINTMENT (OUTPATIENT)
Dept: GENERAL RADIOLOGY | Age: 68
DRG: 673 | End: 2018-02-23
Payer: COMMERCIAL

## 2018-02-23 ENCOUNTER — APPOINTMENT (OUTPATIENT)
Dept: MRI IMAGING | Age: 68
DRG: 673 | End: 2018-02-23
Payer: COMMERCIAL

## 2018-02-23 LAB
ANION GAP SERPL CALCULATED.3IONS-SCNC: 9 MMOL/L (ref 9–17)
BUN BLDV-MCNC: 38 MG/DL (ref 8–23)
BUN/CREAT BLD: ABNORMAL (ref 9–20)
CALCIUM SERPL-MCNC: 8.5 MG/DL (ref 8.6–10.4)
CHLORIDE BLD-SCNC: 101 MMOL/L (ref 98–107)
CO2: 29 MMOL/L (ref 20–31)
CREAT SERPL-MCNC: 2.73 MG/DL (ref 0.7–1.2)
GFR AFRICAN AMERICAN: 28 ML/MIN
GFR NON-AFRICAN AMERICAN: 23 ML/MIN
GFR SERPL CREATININE-BSD FRML MDRD: ABNORMAL ML/MIN/{1.73_M2}
GFR SERPL CREATININE-BSD FRML MDRD: ABNORMAL ML/MIN/{1.73_M2}
GLUCOSE BLD-MCNC: 150 MG/DL (ref 75–110)
GLUCOSE BLD-MCNC: 156 MG/DL (ref 75–110)
GLUCOSE BLD-MCNC: 83 MG/DL (ref 75–110)
GLUCOSE BLD-MCNC: 86 MG/DL (ref 70–99)
GLUCOSE BLD-MCNC: 90 MG/DL (ref 75–110)
HCT VFR BLD CALC: 26.9 % (ref 41–53)
HEMOGLOBIN: 8.1 G/DL (ref 13.5–17.5)
MCH RBC QN AUTO: 31.8 PG (ref 26–34)
MCHC RBC AUTO-ENTMCNC: 30.3 G/DL (ref 31–37)
MCV RBC AUTO: 105.2 FL (ref 80–100)
NRBC AUTOMATED: ABNORMAL PER 100 WBC
PDW BLD-RTO: 19.2 % (ref 11.5–14.9)
PLATELET # BLD: 123 K/UL (ref 150–450)
PMV BLD AUTO: 7.5 FL (ref 6–12)
POTASSIUM SERPL-SCNC: 5.6 MMOL/L (ref 3.7–5.3)
RBC # BLD: 2.56 M/UL (ref 4.5–5.9)
SODIUM BLD-SCNC: 139 MMOL/L (ref 135–144)
WBC # BLD: 8.2 K/UL (ref 3.5–11)

## 2018-02-23 PROCEDURE — 6370000000 HC RX 637 (ALT 250 FOR IP): Performed by: INTERNAL MEDICINE

## 2018-02-23 PROCEDURE — 2500000003 HC RX 250 WO HCPCS: Performed by: STUDENT IN AN ORGANIZED HEALTH CARE EDUCATION/TRAINING PROGRAM

## 2018-02-23 PROCEDURE — 82947 ASSAY GLUCOSE BLOOD QUANT: CPT

## 2018-02-23 PROCEDURE — 6360000002 HC RX W HCPCS: Performed by: INTERNAL MEDICINE

## 2018-02-23 PROCEDURE — 94660 CPAP INITIATION&MGMT: CPT

## 2018-02-23 PROCEDURE — 6370000000 HC RX 637 (ALT 250 FOR IP): Performed by: NURSE PRACTITIONER

## 2018-02-23 PROCEDURE — 2580000003 HC RX 258: Performed by: NURSE PRACTITIONER

## 2018-02-23 PROCEDURE — 6370000000 HC RX 637 (ALT 250 FOR IP): Performed by: FAMILY MEDICINE

## 2018-02-23 PROCEDURE — 36415 COLL VENOUS BLD VENIPUNCTURE: CPT

## 2018-02-23 PROCEDURE — 2060000000 HC ICU INTERMEDIATE R&B

## 2018-02-23 PROCEDURE — 6370000000 HC RX 637 (ALT 250 FOR IP): Performed by: STUDENT IN AN ORGANIZED HEALTH CARE EDUCATION/TRAINING PROGRAM

## 2018-02-23 PROCEDURE — 90937 HEMODIALYSIS REPEATED EVAL: CPT

## 2018-02-23 PROCEDURE — 6360000002 HC RX W HCPCS: Performed by: FAMILY MEDICINE

## 2018-02-23 PROCEDURE — 71045 X-RAY EXAM CHEST 1 VIEW: CPT

## 2018-02-23 PROCEDURE — 94762 N-INVAS EAR/PLS OXIMTRY CONT: CPT

## 2018-02-23 PROCEDURE — 85027 COMPLETE CBC AUTOMATED: CPT

## 2018-02-23 PROCEDURE — S0028 INJECTION, FAMOTIDINE, 20 MG: HCPCS | Performed by: STUDENT IN AN ORGANIZED HEALTH CARE EDUCATION/TRAINING PROGRAM

## 2018-02-23 PROCEDURE — 94640 AIRWAY INHALATION TREATMENT: CPT

## 2018-02-23 PROCEDURE — 99232 SBSQ HOSP IP/OBS MODERATE 35: CPT | Performed by: NURSE PRACTITIONER

## 2018-02-23 PROCEDURE — 80048 BASIC METABOLIC PNL TOTAL CA: CPT

## 2018-02-23 RX ORDER — METHYLPREDNISOLONE SODIUM SUCCINATE 40 MG/ML
40 INJECTION, POWDER, LYOPHILIZED, FOR SOLUTION INTRAMUSCULAR; INTRAVENOUS EVERY 8 HOURS
Status: DISCONTINUED | OUTPATIENT
Start: 2018-02-23 | End: 2018-02-26

## 2018-02-23 RX ADMIN — TAMSULOSIN HYDROCHLORIDE 0.4 MG: 0.4 CAPSULE ORAL at 08:46

## 2018-02-23 RX ADMIN — INSULIN LISPRO 1 UNITS: 100 INJECTION, SOLUTION INTRAVENOUS; SUBCUTANEOUS at 21:22

## 2018-02-23 RX ADMIN — OXYCODONE HYDROCHLORIDE AND ACETAMINOPHEN 1 TABLET: 5; 325 TABLET ORAL at 03:24

## 2018-02-23 RX ADMIN — CARVEDILOL 6.25 MG: 6.25 TABLET, FILM COATED ORAL at 08:46

## 2018-02-23 RX ADMIN — OXYCODONE HYDROCHLORIDE AND ACETAMINOPHEN 1 TABLET: 5; 325 TABLET ORAL at 08:46

## 2018-02-23 RX ADMIN — Medication 10 ML: at 21:03

## 2018-02-23 RX ADMIN — PREDNISONE 10 MG: 10 TABLET ORAL at 08:46

## 2018-02-23 RX ADMIN — HEPARIN SODIUM 5000 UNITS: 5000 INJECTION, SOLUTION INTRAVENOUS; SUBCUTANEOUS at 08:47

## 2018-02-23 RX ADMIN — FENTANYL CITRATE 25 MCG: 50 INJECTION INTRAMUSCULAR; INTRAVENOUS at 11:16

## 2018-02-23 RX ADMIN — Medication 10 ML: at 08:47

## 2018-02-23 RX ADMIN — ISOSORBIDE MONONITRATE 30 MG: 30 TABLET, EXTENDED RELEASE ORAL at 08:46

## 2018-02-23 RX ADMIN — PREGABALIN 50 MG: 50 CAPSULE ORAL at 08:46

## 2018-02-23 RX ADMIN — HYDRALAZINE HYDROCHLORIDE 50 MG: 50 TABLET, FILM COATED ORAL at 05:13

## 2018-02-23 RX ADMIN — IPRATROPIUM BROMIDE AND ALBUTEROL SULFATE 1 AMPULE: .5; 3 SOLUTION RESPIRATORY (INHALATION) at 14:48

## 2018-02-23 RX ADMIN — IPRATROPIUM BROMIDE AND ALBUTEROL SULFATE 1 AMPULE: .5; 3 SOLUTION RESPIRATORY (INHALATION) at 10:51

## 2018-02-23 RX ADMIN — IPRATROPIUM BROMIDE AND ALBUTEROL SULFATE 1 AMPULE: .5; 3 SOLUTION RESPIRATORY (INHALATION) at 07:07

## 2018-02-23 RX ADMIN — OXYCODONE HYDROCHLORIDE 5 MG: 5 TABLET ORAL at 08:46

## 2018-02-23 RX ADMIN — HEPARIN SODIUM 1800 UNITS: 1000 INJECTION, SOLUTION INTRAVENOUS; SUBCUTANEOUS at 12:42

## 2018-02-23 RX ADMIN — DOCUSATE SODIUM 200 MG: 100 CAPSULE, LIQUID FILLED ORAL at 08:46

## 2018-02-23 RX ADMIN — HEPARIN SODIUM 5000 UNITS: 5000 INJECTION, SOLUTION INTRAVENOUS; SUBCUTANEOUS at 21:01

## 2018-02-23 RX ADMIN — Medication 2 PUFF: at 08:46

## 2018-02-23 RX ADMIN — IPRATROPIUM BROMIDE AND ALBUTEROL SULFATE 1 AMPULE: .5; 3 SOLUTION RESPIRATORY (INHALATION) at 21:14

## 2018-02-23 RX ADMIN — PREGABALIN 50 MG: 50 CAPSULE ORAL at 21:01

## 2018-02-23 RX ADMIN — FAMOTIDINE 10 MG: 10 INJECTION, SOLUTION INTRAVENOUS at 08:46

## 2018-02-23 RX ADMIN — INSULIN LISPRO 1 UNITS: 100 INJECTION, SOLUTION INTRAVENOUS; SUBCUTANEOUS at 17:44

## 2018-02-23 RX ADMIN — OXYCODONE HYDROCHLORIDE 5 MG: 5 TABLET ORAL at 03:24

## 2018-02-23 RX ADMIN — HEPARIN SODIUM 1700 UNITS: 1000 INJECTION, SOLUTION INTRAVENOUS; SUBCUTANEOUS at 12:41

## 2018-02-23 RX ADMIN — FENTANYL CITRATE 25 MCG: 50 INJECTION INTRAMUSCULAR; INTRAVENOUS at 21:01

## 2018-02-23 RX ADMIN — HYDRALAZINE HYDROCHLORIDE 50 MG: 50 TABLET, FILM COATED ORAL at 21:01

## 2018-02-23 ASSESSMENT — ENCOUNTER SYMPTOMS
NAUSEA: 0
SINUS PAIN: 0
HEARTBURN: 0
CONSTIPATION: 0
COUGH: 1
BACK PAIN: 1
ORTHOPNEA: 1
PHOTOPHOBIA: 0
SHORTNESS OF BREATH: 1
BLURRED VISION: 0

## 2018-02-23 ASSESSMENT — PAIN SCALES - GENERAL
PAINLEVEL_OUTOF10: 10
PAINLEVEL_OUTOF10: 8
PAINLEVEL_OUTOF10: 7
PAINLEVEL_OUTOF10: 10
PAINLEVEL_OUTOF10: 10
PAINLEVEL_OUTOF10: 3
PAINLEVEL_OUTOF10: 8

## 2018-02-23 ASSESSMENT — PAIN DESCRIPTION - DESCRIPTORS: DESCRIPTORS: ACHING

## 2018-02-23 ASSESSMENT — PAIN DESCRIPTION - LOCATION: LOCATION: BACK

## 2018-02-23 ASSESSMENT — PAIN DESCRIPTION - PAIN TYPE: TYPE: CHRONIC PAIN

## 2018-02-23 NOTE — PROGRESS NOTES
Pain Management Inpatient Progess  Note        Patient: Rosaura Orellana   2/23/2018  Location: 2098/2098-01     CHIEF COMPLAINT:  Burning leg pain for the past 2 weeks     HISTORY OF PRESENT ILLNESS:    The patient is a 75 y. o. male who is referred to pain clinic in consultation for neuropathic  pain by patient's physician.     HPI  Mr. Neha Corbin has a history of COPD, CHF abdominal aortic aneurysm repair complicated by bowel perforation and chronic back and abdominal pain. As a result he is chronic opioid therapy with percocet 1 tablet every 4 hours.     He was admitted on 2/6/18 for management of acute respiratory failure, thought to be secondary to COPD exacerbation.     For the past 2 weeks however, he says that he's been unable to walk due to severe to bilateral symmetric lower extremity pain. The pain is intermittent, burning, tingling, worse with walking and better with rest. The pain does not radiate elsewhere, and it developed gradually over time. He says that he has been on gabapentin in the past, but said that he had a 'bad reaction' that led to his being admitted to hospital. His current pain level is a  \"7\". He states pain is better. He slept fairly well.    Interval History:  -Pain still persists with no improvement  Was increased to 50 mg  Pregablin  -Still says that unable to walk, mostly due to leg pain     Past Medical History:    Past Medical History                Diagnosis Date    Abdominal pain, chronic, generalized      Cervicodynia      Chronic back pain      Chronic kidney disease       secondary to renal vascular disease    Cigarette smoker      COPD (chronic obstructive pulmonary disease) (HCC)      DDD (degenerative disc disease), lumbar      Depression      Family history of cancer      Hernia, ventral      Hyperlipidemia      Iron deficiency anemia      Lichen simplex chronicus      PUD (peptic ulcer disease)      Renal cyst      Type II or unspecified type diabetes adomen  Musculoskeletal:   Had gross movement of lower extremities bilaterally.   Neurological: He is alert and oriented to person, place, and time. Skin: Skin is warm and dry. Skin over lower extremities has 'burning' sensation upon palpation bilaterally up to the level of the knee No discoloration or swelling. Psychiatric: His behavior is normal. Thought content normal.                  DATA:  Old records have not been requested     IMPRESSION  Bilateral neuropathic pain of uncertain etiology still persists despite Pregabalin 25 mg bid     RECOMMENDATIONS: per Dr Flor Betancur  Increase to Pregabalin 50 mg bid  Will continue to follow  [address] MD  Palliative Care Fellow (On Pain Management Elective)           NAME:  Bernice Khan  MRN: [id number]   YOB: 1950   Date: 2/23/2018   Age: 75 y.o. Gender: male         Bernice Khan is 75 y.o. ,male, referred because of Shortness of Breath         I Performed a history and physical examination of the patient and discussed management with the resident/ Physician Assistant/ Nurse Practitioner. I reviewed the Resident/ Physician Assistant/ Nurse Practitioner note and agree with the documented findings and plan of care. Any areas of disagreement are noted on the chart. I was present for any key portions of any procedure. I have documented in the chart those procedures where I was not present during key Portions. I agree with the chief complaint, past medical history, past surgical history, Social & family history, Allergies, medications as documented unless noted below. I have personally evaluated this patient and have completed at least one if not all key elements of the (History, physical exam and plan of care). Additional findings are added to the note above     Diagnosis:   1. Neuropathy ([address]    2. COPD exacerbation ([facility] [address]    3. Gastrointestinal hemorrhage, unspecified gastrointestinal hemorrhage type    4.  Anemia, unspecified type       Plan of Care:   As indicated above e the dose of Lyrica t was increased to 50 mg twice a day area patient is not experiencing any side effects e so far.  Depending on how he responds to the increased dose we'll adjust his medications.

## 2018-02-23 NOTE — PROGRESS NOTES
Musculoskeletal:        Bilateral foot pain   Neurological: Positive for weakness. Negative for headaches. Medications: Allergies: Allergies   Allergen Reactions    Codeine     Contrast [Iodides]      IVP dyes       Current Meds:   Scheduled Meds:    pregabalin  50 mg Oral BID    linezolid  600 mg Oral 2 times per day    predniSONE  10 mg Oral Daily    heparin (porcine)  5,000 Units Subcutaneous 2 times per day    tamsulosin  0.4 mg Oral Daily    darbepoetin trang-polysorbate  100 mcg Subcutaneous Weekly    hydrALAZINE  50 mg Oral 3 times per day    isosorbide mononitrate  30 mg Oral Daily    carvedilol  6.25 mg Oral BID     heparin (porcine)  10,000 Units Intercatheter Once    famotidine (PEPCID) injection  10 mg Intravenous Daily    insulin lispro  0-6 Units Subcutaneous TID     insulin lispro  0-3 Units Subcutaneous Nightly    docusate sodium  200 mg Oral Daily    sodium chloride flush  10 mL Intravenous 2 times per day    ipratropium-albuterol  1 ampule Inhalation Q4H WA    mometasone-formoterol  2 puff Inhalation BID    sodium chloride flush  10 mL Intravenous Q12H     Continuous Infusions:    dextrose       PRN Meds: heparin (porcine), heparin (porcine), diazepam, oxyCODONE-acetaminophen **AND** oxyCODONE, fentanNYL, hydrALAZINE, sodium chloride flush, acetaminophen, magnesium hydroxide, bisacodyl, ondansetron, nicotine, albuterol, glucose, dextrose, glucagon (rDNA), dextrose, ipratropium-albuterol, sodium chloride flush    Data:     Past Medical History:   has a past medical history of Abdominal pain, chronic, generalized; Cervicodynia; Chronic back pain; Chronic kidney disease; Cigarette smoker; COPD (chronic obstructive pulmonary disease) (HCC); DDD (degenerative disc disease), lumbar; Depression; Family history of cancer; Hernia, ventral; Hyperlipidemia; Iron deficiency anemia; Lichen simplex chronicus; PUD (peptic ulcer disease);  Renal cyst; Type II or unspecified type diabetes mellitus without mention of complication, not stated as uncontrolled; and Vitamin D deficiency. Social History:   reports that he has been smoking Cigarettes. He has been smoking about 2.00 packs per day. He does not have any smokeless tobacco history on file. He reports that he does not drink alcohol or use drugs. Family History: History reviewed. No pertinent family history. Vitals:  BP (!) 108/54   Pulse 97   Temp 98.1 °F (36.7 °C)   Resp 19   Ht 5' 8\" (1.727 m)   Wt 121 lb 0.5 oz (54.9 kg)   SpO2 91%   BMI 18.40 kg/m²   Temp (24hrs), Av.9 °F (36.6 °C), Min:97.4 °F (36.3 °C), Max:98.1 °F (36.7 °C)    Recent Labs      18   1540  18   0652  18   1137   POCGLU  122*  125*  83  90       I/O (24Hr): Intake/Output Summary (Last 24 hours) at 18 1505  Last data filed at 18 1329   Gross per 24 hour   Intake              200 ml   Output             3400 ml   Net            -3200 ml       Labs:    @CBC@  basic metabolic panel    Lab Results   Component Value Date/Time    SPECIAL NOT REPORTED 02/15/2018 06:04 PM     Lab Results   Component Value Date/Time    CULTURE (A) 02/15/2018 06:04 PM     METHICILLIN RESISTANT STAPHYLOCOCCUS AUREUS HEAVY GROWTH    CULTURE NORMAL RESPIRATORY ALLY LIGHT GROWTH 02/15/2018 06:04 PM    CULTURE  02/15/2018 06:04 PM     Performed at [address] [phone]       [facility]    Radiology:    Ct Abdomen Pelvis Wo Contrast Additional Contrast? Radiologist Recommendation    Result Date: 2018  EXAMINATION: CT OF THE ABDOMEN AND PELVIS WITHOUT CONTRAST 2018 11:03 am TECHNIQUE: CT of the abdomen and pelvis was performed without the administration of intravenous contrast. Multiplanar reformatted images are provided for review.  Dose modulation, iterative reconstruction, and/or weight based adjustment of the mA/kV was utilized to reduce the radiation dose to as low as Peritoneum/retroperitoneum:  No lymphadenopathy, free fluid or free air identified in the abdomen or pelvis. Stable findings associated with patient's axillary bifemoral graft with the visualized component of the graft descending along the left chest wall and left flank and a communicating with the left femoral artery with bridge connecting to the right femoral artery. There is focal occlusion of the abdominal aorta inferior to the take-off of the bilateral renal arteries. No retroperitoneal or intramuscular hematoma identified. Pelvis: Manzanares catheter in the urinary bladder. Normal noncontrast CT appearance of the prostate. . The urinary bladder is unremarkable. Bones/soft tissues: Similar appearance of increased sclerosis of the left femoral head may reflect sequelae of prior AVN. No suspicious osseous lesions are identified. 1. No retroperitoneal or intramuscular hematoma. 2. There is new nonspecific prominence of the main portal vein. Could consider Doppler integration for further evaluation, as indicated. 3. There are centrilobular emphysematous changes in the lung bases with peripherally oriented nodular opacities, may reflect atelectatic changes, but true lung nodules are not excluded. Nonemergent chest CT is recommended for further evaluation. 4. Other stable findings, as above. Xr Chest (single View Frontal)    Result Date: 2/19/2018  EXAMINATION: SPOT FLUOROSCOPIC IMAGES 2/19/2018 8:36 am TECHNIQUE: Fluoroscopy was provided by the radiology department for procedure. Radiologist was not present during examination. FLUOROSCOPY DOSE AND TYPE OR TIME AND EXPOSURES: 8 seconds. 1 image. COMPARISON: None HISTORY: Intra procedural imaging during hemodialysis catheter placement. FINDINGS: The single image is providing demonstrating dual-lumen hemodialysis catheter placement with the tips projecting in the region of the distal SVC. Intraprocedural fluoroscopic spot images as above.   See separate noted.  No obvious focal airspace consolidation or pneumothorax. Osseous structures and soft tissues are grossly stable. Right-sided jugular venous catheter remains in unchanged position. Chronic findings of COPD. No obvious focal airspace consolidation or pneumothorax. Xr Chest Portable    Result Date: 2/12/2018  EXAMINATION: SINGLE VIEW OF THE CHEST 2/12/2018 9:41 am COMPARISON: Chest February 8, 2018. HISTORY: ORDERING SYSTEM PROVIDED HISTORY: line placement TECHNOLOGIST PROVIDED HISTORY: patient is moving to ICU Reason for exam:->line placement Ordering Physician Provided Reason for Exam: right side line placement Acuity: Unknown Type of Exam: Unknown FINDINGS: Interval placement of right-sided IJ line with tip in the SVC. No pneumothorax. Heart and mediastinal structures appear unchanged. COPD changes and vascular congestion and trace bilateral pleural effusions persists. Interval placement of right-sided IJ line with tip in the SVC. No pneumothorax. No significant change chest findings. Xr Chest Portable    Result Date: 2/8/2018  EXAMINATION: SINGLE VIEW OF THE CHEST 2/8/2018 9:05 am COMPARISON: February 5, 2018 HISTORY: ORDERING SYSTEM PROVIDED HISTORY: Crackles on Auscultation TECHNOLOGIST PROVIDED HISTORY: Reason for exam:->Crackles on Auscultation Ordering Physician Provided Reason for Exam: crackles Acuity: Unknown Type of Exam: Unknown FINDINGS: The cardiomediastinal silhouette is stable. There is flattening of the diaphragms, likely related to COPD. There is mild pulmonary vascular congestion. There is mild atelectasis and small pleural effusions at the bilateral lung bases. No evidence of pneumothorax. The osseous structures are stable. Mild cardiomegaly. Mild pulmonary vascular congestion. Probable COPD. Mild atelectasis and small pleural effusions at the bilateral lung bases.      Xr Chest Portable    Result Date: 2/6/2018  EXAMINATION: SINGLE VIEW OF THE CHEST bypass. Bypass appears to be totally occluded when imaged with B-Mode. Bypass has no detectable velocities. Conclusions   Summary   Simultaneous real time imaging utilizing B-Mode, color doppler and  spectral waveform analysis was performed on bilateral lower extremities  for arterial examination: Study demonstrates:   ABNORMAL STUDY   Occluded femoral to femoral bypass graft. Severely reduced velocities with monophasic waveforms in the external  iliac, common femoral, profunda, superficial femoral, and popliteal  arteries bilateraly concerning for aortoiliac disease. Recommendations   Correlate clinically. Signature   ----------------------------------------------------------------  Electronically signed by Casper Amador(Sonographer) on  02/15/2018 05:20 PM  ----------------------------------------------------------------   ----------------------------------------------------------------  Electronically signed by Sung Jade(Interpreting  physician) on 02/15/2018 06:02 PM  ----------------------------------------------------------------  Findings:   Right Impression:                   Left Impression:  Decreased velocities and monophasic Decreased velocities and monophasic  waveforms in the external iliac,    waveforms in the external iliac,  common femoral, profunda,           common femoral, profunda, superficial  superficial femoral, popliteal and  femoral, popliteal, posterior tibial  posterior tibial arteries. and peroneal arteries. Occluded femoral to femoral artery  Occluded femoral to femoral artery  bypass graft. bypass graft. Velocities are measured in cm/s ; Diameters are measured in mm LE Duplex Measurements +------------++-----+-----+--------+--------++----+-----+--------+---------+ ! ! !Right! ! Left    !        !!    !     !        !         ! +------------++-----+-----+--------+--------++----+-----+--------+---------+ ! Location    ! !PSV +------------------------------------+----------+---------------+----------+ ! PTV                                 ! Partial   !Yes            ! None      ! +------------------------------------+----------+---------------+----------+ ! Peroneal                            !Partial   !Yes            ! None      ! +------------------------------------+----------+---------------+----------+ ! Gastroc                             ! Yes       ! Yes            ! None      ! +------------------------------------+----------+---------------+----------+ ! GSV Thigh                           ! Yes       ! Yes            ! None      ! +------------------------------------+----------+---------------+----------+ ! GSV Knee                            ! Yes       ! Yes            ! None      ! +------------------------------------+----------+---------------+----------+ ! GSV Ankle                           ! Yes       ! Yes            ! None      ! +------------------------------------+----------+---------------+----------+ ! SSV                                 ! Yes       ! Yes            ! None      ! +------------------------------------+----------+---------------+----------+ Left Lower Extremities DVT Study Measurements Left 2D Measurements +------------------------------------+----------+---------------+----------+ ! Location                            ! Visualized! Compressibility! Thrombosis! +------------------------------------+----------+---------------+----------+ ! Common Femoral                      !Yes       ! Yes            ! None      ! +------------------------------------+----------+---------------+----------+ ! Prox Femoral                        !Yes       ! Yes            ! None      ! +------------------------------------+----------+---------------+----------+ ! Mid Femoral                         !Yes       ! Yes            ! None      ! +------------------------------------+----------+---------------+----------+ ! Dist Femoral ----------------------------------------------------------------  Findings:   Right Impression:                    Left Impression:  Decreased amplitude, monophasic      Decreased amplitude, monophasic  waveform at the thigh, calf and      waveform at the thigh, calf and ankle  ankle levels. levels. Decreased amplitude digit waveform. Decreased amplitude digit waveform. Significant pressure reduction       Significant pressure reduction  between the brachial cuff and thigh  between the brachial cuff and thigh  cuff suggesting aorto-iliac disease. cuff suggesting aorto-iliac disease. Significant pressure reduction       NICO demonstrates critical limb  between the calf and ankle cuffs     ischemia.  suggesting tibial artery disease. TBI demonstrates critical limb  NICO demonstrates critical limb       ischemia. ischemia. TBI demonstrates critical limb  ischemia. Velocities are measured in cm/s ; Diameters are measured in mm Pressures +--------------------------------------++--------+-----+----+--------+-----+ ! ! !Right   ! ! Left!        !     ! +--------------------------------------++--------+-----+----+--------+-----+ ! Location                              ! !Pressure! Ratio! !Pressure! Ratio! +--------------------------------------++--------+-----+----+--------+-----+ ! Thigh                                 !!48      !0.35 ! !50      !0.36 ! +--------------------------------------++--------+-----+----+--------+-----+ ! Calf                                  !!136     !0.99 !    !49      !0.36 ! +--------------------------------------++--------+-----+----+--------+-----+ ! Ankle PT                              !!30      !0.22 !    !33      !0.24 ! +--------------------------------------++--------+-----+----+--------+-----+ ! Ankle DP                              !!39      !0.28 !    !33      !0.24 !

## 2018-02-23 NOTE — PROGRESS NOTES
Nephrology Progress Note    Subjective/     Patient is being followed by nephrology for the management of acute kidney injury superimposed on chronic kidney disease stage 4. This is a 75 y.o. male active smoker of 2 PPD for many years, hyperlipidemia, COPD and chronic kidney disease stage 4 secondary to hypertension + type 2 DM (baseline creatinine of 2.6-2.8 mg/dl), who presented with decreased urine output and general weakness and was found to have a potassium of 6.0 and BUN/creatinne of 77/3.70 mg/dl. He was seen and examined on hemodialysis today and does not have shortness of breath but complains of back pain, bilateral knee and leg pain. Could not get MRI Lumbar spine because patient couldn't lie flat. He had a right IJ tunneled catheter placed on 2/19/2018. Has right foot drop and being investigated. Objective/     Vitals:    02/23/18 1045 02/23/18 1116 02/23/18 1144 02/23/18 1146   BP: (!) 124/59 (!) 101/49 (!) 79/22 (!) 90/30   Pulse: 94 94 71 94   Resp: 24 24 24 24   Temp:       TempSrc:       SpO2:       Weight:       Height:         24HR INTAKE/OUTPUT:      Intake/Output Summary (Last 24 hours) at 02/23/18 1207  Last data filed at 02/23/18 0514   Gross per 24 hour   Intake                0 ml   Output              400 ml   Net             -400 ml     Patient Vitals for the past 96 hrs (Last 3 readings):   Weight   02/23/18 0500 126 lb 12.2 oz (57.5 kg)   02/22/18 0545 124 lb 9 oz (56.5 kg)   02/21/18 1130 118 lb 13.3 oz (53.9 kg)       Constitutional:  Alert, awake,but in apparent distress  Cardiovascular:  S1, S2 without m/r/g  Respiratory:  Diminished. Abdomen: +bs, soft, nt  Ext: Decreased LE edema  Right foot drop.     Data/  Recent Labs      02/21/18   0545  02/22/18   0600  02/23/18   0701   WBC  5.8  6.8  8.2   HGB  7.6*  8.3*  8.1*   HCT  24.3*  26.2*  26.9*   MCV  103.3*  103.2*  105.2*   PLT  96*  109*  123*     Recent Labs      02/21/18   0545  02/22/18   0600  02/23/18   0701   NA

## 2018-02-23 NOTE — PROGRESS NOTES
Pain Management Inpatient Progess  Note        Patient: Cassidy Rosales   2/23/2018  Location: 2098/2098-01     CHIEF COMPLAINT:  Burning leg pain for the past 2 weeks     HISTORY OF PRESENT ILLNESS:    The patient is a 75 y. o. male who is referred to pain clinic in consultation for neuropathic  pain by patient's physician.     HPI  Mr. Pauly Lee has a history of COPD, CHF abdominal aortic aneurysm repair complicated by bowel perforation and chronic back and abdominal pain. As a result he is chronic opioid therapy with percocet 1 tablet every 4 hours.     He was admitted on 2/6/18 for management of acute respiratory failure, thought to be secondary to COPD exacerbation.     For the past 2 weeks however, he says that he's been unable to walk due to severe to bilateral symmetric lower extremity pain. The pain is intermittent, burning, tingling, worse with walking and better with rest. The pain does not radiate elsewhere, and it developed gradually over time. He says that he has been on gabapentin in the past, but said that he had a 'bad reaction' that led to his being admitted to hospital. His current pain level is a  \"7\". He states pain is better. Interval History:  -Transferred to the ICU after hypoxic episode after dialysis.  Now now on Bipap  -Even on Bipap; alert, smiling occasionally, able to nod 'yes' and 'no'  -Says that his leg pain still has not improved    Past Medical History:    Past Medical History                Diagnosis Date    Abdominal pain, chronic, generalized      Cervicodynia      Chronic back pain      Chronic kidney disease       secondary to renal vascular disease    Cigarette smoker      COPD (chronic obstructive pulmonary disease) ([address]      DDD (degenerative disc disease), lumbar      Depression      Family history of cancer      Hernia, ventral      Hyperlipidemia      Iron deficiency anemia      Lichen simplex chronicus      PUD (peptic ulcer disease)      Renal cyst   daily.       Yes Historical Provider, MD   tiotropium (SPIRIVA) 18 MCG inhalation capsule Inhale 18 mcg into the lungs daily.       Yes Historical Provider, MD   levalbuterol (XOPENEX HFA) 45 MCG/ACT inhaler Inhale 1-2 puffs into the lungs every 4 hours as needed.       Yes Historical Provider, MD             Allergies:  Codeine and Contrast [iodides]     Social History:  Social History   Social History                Social History    Marital status:        Spouse name: N/A    Number of children: N/A    Years of education: N/A            Occupational History    Not on file.                Social History Main Topics    Smoking status: Current Every Day Smoker       Packs/day: 2.00       Types: Cigarettes    Smokeless tobacco: Not on file    Alcohol use No    Drug use: No    Sexual activity: Not on file              Other Topics Concern    Not on file            Social History Narrative    No narrative on file            Family History:   Family History   History reviewed. No pertinent family history.         REVIEW OF SYSTEMS:  Review of Systems   Constitutional: Negative. HENT: Negative. Respiratory: Positive for cough and shortness of breath. Gastrointestinal: Positive for abdominal pain. Genitourinary:        On dialysis   Skin: Negative. Neurological:        Burning feet and legs               PHYSICAL EXAM:  Vitals:    02/23/18 1700   BP: 115/70   Pulse: 101   Resp: 22   Temp:    SpO2: 90%     Physical Exam   Constitutional: He is alert, responsive and enegaging. Receiving Bipap ventilation  HENT:   Head: Normocephalic. Eyes: EOM are normal.  Cardiovascular: Normal rate.  Exam reveals no gallop and no friction rub.    Murmur heard. Pulmonary/Chest: Effort normal and breath sounds normal. No respiratory distress. He has coarse breath sounds. Abdominal: Bowel sounds are normal. He exhibits distension. There is tenderness.  Scar over adomen  Musculoskeletal:   Had gross Lyrica dose. Will continue to follow    Gely William MD  Palliative Care Fellow       NAME:  Cassidy Rosales  MRN: [id number]   YOB: 1950   Date: 2/27/2018   Age: 75 y.o. Gender: male       Cassidy Rosales is 75 y.o. ,male, referred because of Shortness of Breath        I Performed a history and physical examination of the patient and discussed management with the resident/ Physician Assistant/ Nurse Practitioner. I reviewed the Resident/ Physician Assistant/ Nurse Practitioner note and agree with the documented findings and plan of care. Any areas of disagreement are noted on the chart. I was present for any key portions of any procedure. I have documented in the chart those procedures where I was not present during key Portions. I agree with the chief complaint, past medical history, past surgical history, Social & family history, Allergies, medications as documented unless noted below. I have personally evaluated this patient and have completed at least one if not all key elements of the (History, physical exam and plan of care). Additional findings are added to the note above    Diagnosis:   1. Neuropathy ([facility]    2. COPD exacerbation ([facility] [address] [facility]    3. Gastrointestinal hemorrhage, unspecified gastrointestinal hemorrhage type    4. Anemia, unspecified type          Plan of Care:   As indicated above we will continue to follow him. We will continue him on the current medications as they're helping his pain.     Donita Azul MD

## 2018-02-23 NOTE — PROGRESS NOTES
[facility]: [facility]   OCCUPATIONAL THERAPY MISSED TREATMENT NOTE   INPATIENT   Date: 18  Patient Name: Cassidy Rosales       Room:   MRN: [id number]   Account #: [de-identified]    : 1950  (76 y.o.)  Gender: male   Referring Practitioner: Dr Carmina Grider   Diagnosis: COPD - Acute on Chronic REpsiratory failure  plus Acute Renal failure with dialysis              REASON FOR MISSED TREATMENT:  Patient unable to participate   -   Hemodialysis         Le Olsen ADRIÁN

## 2018-02-23 NOTE — PROGRESS NOTES
Patient seen and examined  Afebrile. Vitals are stable  Complains of left leg cramping  Tunneled hemodialysis catheter site is clean and dry.   No issues with dialysis    CBC:   Lab Results   Component Value Date    WBC 8.2 02/23/2018    RBC 2.56 02/23/2018    HGB 8.1 02/23/2018    HCT 26.9 02/23/2018    .2 02/23/2018    MCH 31.8 02/23/2018    MCHC 30.3 02/23/2018    RDW 19.2 02/23/2018     02/23/2018    MPV 7.5 02/23/2018     BMP:    Lab Results   Component Value Date     02/23/2018    K 5.6 02/23/2018     02/23/2018    CO2 29 02/23/2018    BUN 38 02/23/2018    LABALBU 2.7 02/06/2018    CREATININE 2.73 02/23/2018    CALCIUM 8.5 02/23/2018    GFRAA 28 02/23/2018    LABGLOM 23 02/23/2018    GLUCOSE 86 02/23/2018    GLUCOSE 86 03/27/2012     Your medical management  We'll follow periodically

## 2018-02-23 NOTE — PROGRESS NOTES
Pt transferred to Intermediate room 2012 from [facility]. BIPAP on 18/10 100%. O2 sats now 98% and RT decreased FiO2 to 50%. Pt alert, impulsive and forgetful. Family in waiting room. Pt oriented to room and call light. CXR as noted. Pt able to talk in short sentences and repositions self in bed.

## 2018-02-23 NOTE — PROGRESS NOTES
Pt back on bipap post dialysis spo2 dropping in the low 80's bipap sttings 18/10 100% for now will decrease o2 as spo2's come up, being trasfered to the ICU.

## 2018-02-24 LAB
ANION GAP SERPL CALCULATED.3IONS-SCNC: 9 MMOL/L (ref 9–17)
BUN BLDV-MCNC: 24 MG/DL (ref 8–23)
BUN/CREAT BLD: ABNORMAL (ref 9–20)
CALCIUM SERPL-MCNC: 8.2 MG/DL (ref 8.6–10.4)
CHLORIDE BLD-SCNC: 98 MMOL/L (ref 98–107)
CO2: 27 MMOL/L (ref 20–31)
CREAT SERPL-MCNC: 1.94 MG/DL (ref 0.7–1.2)
GFR AFRICAN AMERICAN: 42 ML/MIN
GFR NON-AFRICAN AMERICAN: 35 ML/MIN
GFR SERPL CREATININE-BSD FRML MDRD: ABNORMAL ML/MIN/{1.73_M2}
GFR SERPL CREATININE-BSD FRML MDRD: ABNORMAL ML/MIN/{1.73_M2}
GLUCOSE BLD-MCNC: 100 MG/DL (ref 75–110)
GLUCOSE BLD-MCNC: 111 MG/DL (ref 70–99)
GLUCOSE BLD-MCNC: 116 MG/DL (ref 75–110)
GLUCOSE BLD-MCNC: 148 MG/DL (ref 75–110)
GLUCOSE BLD-MCNC: 172 MG/DL (ref 75–110)
HCT VFR BLD CALC: 24.5 % (ref 41–53)
HEMOGLOBIN: 7.6 G/DL (ref 13.5–17.5)
MCH RBC QN AUTO: 32.3 PG (ref 26–34)
MCHC RBC AUTO-ENTMCNC: 31 G/DL (ref 31–37)
MCV RBC AUTO: 104.1 FL (ref 80–100)
NRBC AUTOMATED: ABNORMAL PER 100 WBC
PDW BLD-RTO: 19.3 % (ref 11.5–14.9)
PLATELET # BLD: 97 K/UL (ref 150–450)
PMV BLD AUTO: 7.6 FL (ref 6–12)
POTASSIUM SERPL-SCNC: 4.6 MMOL/L (ref 3.7–5.3)
RBC # BLD: 2.35 M/UL (ref 4.5–5.9)
SODIUM BLD-SCNC: 134 MMOL/L (ref 135–144)
WBC # BLD: 12.6 K/UL (ref 3.5–11)

## 2018-02-24 PROCEDURE — 6370000000 HC RX 637 (ALT 250 FOR IP): Performed by: INTERNAL MEDICINE

## 2018-02-24 PROCEDURE — 6370000000 HC RX 637 (ALT 250 FOR IP): Performed by: NURSE PRACTITIONER

## 2018-02-24 PROCEDURE — 6360000002 HC RX W HCPCS: Performed by: INTERNAL MEDICINE

## 2018-02-24 PROCEDURE — 2500000003 HC RX 250 WO HCPCS: Performed by: STUDENT IN AN ORGANIZED HEALTH CARE EDUCATION/TRAINING PROGRAM

## 2018-02-24 PROCEDURE — 6370000000 HC RX 637 (ALT 250 FOR IP): Performed by: STUDENT IN AN ORGANIZED HEALTH CARE EDUCATION/TRAINING PROGRAM

## 2018-02-24 PROCEDURE — 99232 SBSQ HOSP IP/OBS MODERATE 35: CPT | Performed by: PAIN MEDICINE

## 2018-02-24 PROCEDURE — 80048 BASIC METABOLIC PNL TOTAL CA: CPT

## 2018-02-24 PROCEDURE — 2580000003 HC RX 258: Performed by: NURSE PRACTITIONER

## 2018-02-24 PROCEDURE — 94762 N-INVAS EAR/PLS OXIMTRY CONT: CPT

## 2018-02-24 PROCEDURE — 99233 SBSQ HOSP IP/OBS HIGH 50: CPT | Performed by: INTERNAL MEDICINE

## 2018-02-24 PROCEDURE — 94660 CPAP INITIATION&MGMT: CPT

## 2018-02-24 PROCEDURE — 94640 AIRWAY INHALATION TREATMENT: CPT

## 2018-02-24 PROCEDURE — P9046 ALBUMIN (HUMAN), 25%, 20 ML: HCPCS | Performed by: INTERNAL MEDICINE

## 2018-02-24 PROCEDURE — 82947 ASSAY GLUCOSE BLOOD QUANT: CPT

## 2018-02-24 PROCEDURE — S0028 INJECTION, FAMOTIDINE, 20 MG: HCPCS | Performed by: STUDENT IN AN ORGANIZED HEALTH CARE EDUCATION/TRAINING PROGRAM

## 2018-02-24 PROCEDURE — 2580000003 HC RX 258: Performed by: EMERGENCY MEDICINE

## 2018-02-24 PROCEDURE — 90937 HEMODIALYSIS REPEATED EVAL: CPT

## 2018-02-24 PROCEDURE — 2060000000 HC ICU INTERMEDIATE R&B

## 2018-02-24 PROCEDURE — 36415 COLL VENOUS BLD VENIPUNCTURE: CPT

## 2018-02-24 PROCEDURE — 6360000002 HC RX W HCPCS: Performed by: FAMILY MEDICINE

## 2018-02-24 PROCEDURE — 85027 COMPLETE CBC AUTOMATED: CPT

## 2018-02-24 RX ORDER — ALBUMIN (HUMAN) 12.5 G/50ML
25 SOLUTION INTRAVENOUS PRN
Status: DISCONTINUED | OUTPATIENT
Start: 2018-02-24 | End: 2018-02-27 | Stop reason: HOSPADM

## 2018-02-24 RX ORDER — IPRATROPIUM BROMIDE AND ALBUTEROL SULFATE 2.5; .5 MG/3ML; MG/3ML
1 SOLUTION RESPIRATORY (INHALATION) EVERY 4 HOURS
Status: DISCONTINUED | OUTPATIENT
Start: 2018-02-24 | End: 2018-02-27 | Stop reason: HOSPADM

## 2018-02-24 RX ORDER — CLOTRIMAZOLE 1 %
CREAM (GRAM) TOPICAL 2 TIMES DAILY
Status: DISCONTINUED | OUTPATIENT
Start: 2018-02-24 | End: 2018-02-27 | Stop reason: HOSPADM

## 2018-02-24 RX ADMIN — FENTANYL CITRATE 25 MCG: 50 INJECTION INTRAMUSCULAR; INTRAVENOUS at 00:20

## 2018-02-24 RX ADMIN — IPRATROPIUM BROMIDE AND ALBUTEROL SULFATE 1 AMPULE: .5; 3 SOLUTION RESPIRATORY (INHALATION) at 07:37

## 2018-02-24 RX ADMIN — LINEZOLID 600 MG: 600 TABLET, FILM COATED ORAL at 21:04

## 2018-02-24 RX ADMIN — CLOTRIMAZOLE: 10 CREAM TOPICAL at 10:46

## 2018-02-24 RX ADMIN — METHYLPREDNISOLONE SODIUM SUCCINATE 40 MG: 40 INJECTION, POWDER, LYOPHILIZED, FOR SOLUTION INTRAMUSCULAR; INTRAVENOUS at 00:14

## 2018-02-24 RX ADMIN — IPRATROPIUM BROMIDE AND ALBUTEROL SULFATE 1 AMPULE: .5; 3 SOLUTION RESPIRATORY (INHALATION) at 14:45

## 2018-02-24 RX ADMIN — Medication 2 PUFF: at 08:35

## 2018-02-24 RX ADMIN — OXYCODONE HYDROCHLORIDE 5 MG: 5 TABLET ORAL at 21:26

## 2018-02-24 RX ADMIN — HEPARIN SODIUM 5000 UNITS: 5000 INJECTION, SOLUTION INTRAVENOUS; SUBCUTANEOUS at 08:35

## 2018-02-24 RX ADMIN — METHYLPREDNISOLONE SODIUM SUCCINATE 40 MG: 40 INJECTION, POWDER, LYOPHILIZED, FOR SOLUTION INTRAMUSCULAR; INTRAVENOUS at 07:12

## 2018-02-24 RX ADMIN — OXYCODONE HYDROCHLORIDE AND ACETAMINOPHEN 1 TABLET: 5; 325 TABLET ORAL at 16:46

## 2018-02-24 RX ADMIN — OXYCODONE HYDROCHLORIDE AND ACETAMINOPHEN 1 TABLET: 5; 325 TABLET ORAL at 21:26

## 2018-02-24 RX ADMIN — FAMOTIDINE 10 MG: 10 INJECTION, SOLUTION INTRAVENOUS at 08:39

## 2018-02-24 RX ADMIN — DOCUSATE SODIUM 200 MG: 100 CAPSULE, LIQUID FILLED ORAL at 08:39

## 2018-02-24 RX ADMIN — CARVEDILOL 6.25 MG: 6.25 TABLET, FILM COATED ORAL at 08:40

## 2018-02-24 RX ADMIN — TAMSULOSIN HYDROCHLORIDE 0.4 MG: 0.4 CAPSULE ORAL at 08:39

## 2018-02-24 RX ADMIN — Medication 10 ML: at 08:50

## 2018-02-24 RX ADMIN — Medication 10 ML: at 21:03

## 2018-02-24 RX ADMIN — METHYLPREDNISOLONE SODIUM SUCCINATE 40 MG: 40 INJECTION, POWDER, LYOPHILIZED, FOR SOLUTION INTRAMUSCULAR; INTRAVENOUS at 16:46

## 2018-02-24 RX ADMIN — OXYCODONE HYDROCHLORIDE AND ACETAMINOPHEN 1 TABLET: 5; 325 TABLET ORAL at 08:48

## 2018-02-24 RX ADMIN — HEPARIN SODIUM 1800 UNITS: 1000 INJECTION, SOLUTION INTRAVENOUS; SUBCUTANEOUS at 15:07

## 2018-02-24 RX ADMIN — OXYCODONE HYDROCHLORIDE 5 MG: 5 TABLET ORAL at 16:46

## 2018-02-24 RX ADMIN — ALBUMIN (HUMAN) 25 G: 0.25 INJECTION, SOLUTION INTRAVENOUS at 13:32

## 2018-02-24 RX ADMIN — LINEZOLID 600 MG: 600 TABLET, FILM COATED ORAL at 00:14

## 2018-02-24 RX ADMIN — PREGABALIN 50 MG: 50 CAPSULE ORAL at 21:04

## 2018-02-24 RX ADMIN — PREGABALIN 50 MG: 50 CAPSULE ORAL at 08:39

## 2018-02-24 RX ADMIN — IPRATROPIUM BROMIDE AND ALBUTEROL SULFATE 1 AMPULE: .5; 3 SOLUTION RESPIRATORY (INHALATION) at 19:12

## 2018-02-24 RX ADMIN — LINEZOLID 600 MG: 600 TABLET, FILM COATED ORAL at 11:56

## 2018-02-24 RX ADMIN — CLOTRIMAZOLE: 10 CREAM TOPICAL at 21:04

## 2018-02-24 RX ADMIN — Medication 2 PUFF: at 21:04

## 2018-02-24 RX ADMIN — ISOSORBIDE MONONITRATE 30 MG: 30 TABLET, EXTENDED RELEASE ORAL at 08:39

## 2018-02-24 RX ADMIN — IPRATROPIUM BROMIDE AND ALBUTEROL SULFATE 1 AMPULE: .5; 3 SOLUTION RESPIRATORY (INHALATION) at 11:34

## 2018-02-24 RX ADMIN — INSULIN LISPRO 1 UNITS: 100 INJECTION, SOLUTION INTRAVENOUS; SUBCUTANEOUS at 12:00

## 2018-02-24 RX ADMIN — Medication 10 ML: at 00:15

## 2018-02-24 RX ADMIN — CARVEDILOL 6.25 MG: 6.25 TABLET, FILM COATED ORAL at 18:30

## 2018-02-24 RX ADMIN — METHYLPREDNISOLONE SODIUM SUCCINATE 40 MG: 40 INJECTION, POWDER, LYOPHILIZED, FOR SOLUTION INTRAMUSCULAR; INTRAVENOUS at 23:54

## 2018-02-24 RX ADMIN — HEPARIN SODIUM 1700 UNITS: 1000 INJECTION, SOLUTION INTRAVENOUS; SUBCUTANEOUS at 15:07

## 2018-02-24 RX ADMIN — OXYCODONE HYDROCHLORIDE 5 MG: 5 TABLET ORAL at 10:42

## 2018-02-24 RX ADMIN — HEPARIN SODIUM 5000 UNITS: 5000 INJECTION, SOLUTION INTRAVENOUS; SUBCUTANEOUS at 21:04

## 2018-02-24 ASSESSMENT — ENCOUNTER SYMPTOMS
COUGH: 1
SHORTNESS OF BREATH: 1
HEMOPTYSIS: 0
WHEEZING: 1
ABDOMINAL PAIN: 1
SPUTUM PRODUCTION: 1
NAUSEA: 0
VOMITING: 0
DIARRHEA: 0

## 2018-02-24 ASSESSMENT — PAIN DESCRIPTION - LOCATION: LOCATION: BACK

## 2018-02-24 ASSESSMENT — PAIN SCALES - GENERAL
PAINLEVEL_OUTOF10: 7
PAINLEVEL_OUTOF10: 4
PAINLEVEL_OUTOF10: 3
PAINLEVEL_OUTOF10: 7
PAINLEVEL_OUTOF10: 9
PAINLEVEL_OUTOF10: 6
PAINLEVEL_OUTOF10: 7
PAINLEVEL_OUTOF10: 3
PAINLEVEL_OUTOF10: 6

## 2018-02-24 ASSESSMENT — PAIN DESCRIPTION - PAIN TYPE: TYPE: CHRONIC PAIN

## 2018-02-24 NOTE — PROGRESS NOTES
Phosphorus:    Lab Results   Component Value Date    PHOS 6.6 02/06/2018        LIVER PROFILE No results for input(s): AST, ALT, LIPASE, BILIDIR, BILITOT, ALKPHOS in the last 72 hours. Invalid input(s): AMYLASE,  ALB  INR No results for input(s): INR in the last 72 hours.   PTT No results found for: APTT      RADIOLOGY     (See actual reports for details)    ASSESSMENT/PLAN     Patient Active Problem List   Diagnosis    Type 2 diabetes mellitus without complication, with long-term current use of insulin ([facility]    Hyperlipidemia    COPD (chronic obstructive pulmonary disease) (HCC)    Iron deficiency anemia due to chronic blood loss    Cigarette smoker    Abdominal pain, chronic, generalized    Hernia, ventral    DDD (degenerative disc disease), lumbar    Cervicodynia    PUD (peptic ulcer disease)    Chronic kidney disease (CKD), stage IV (severe) ([facility]    Depression    Vitamin D deficiency    Lichen simplex chronicus    Hyperkalemia    Family history of cancer    COPD exacerbation ([facility]    Abdominal aortic aneurysm (AAA) ([facility]    Acute on chronic respiratory failure with hypercapnia (HCC)    Acute on chronic systolic heart failure (HCC)    Drug-induced hyperglycemia    Anemia    Gastrointestinal hemorrhage    Abdominal wall hernia    Absolute anemia    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

## 2018-02-24 NOTE — PROGRESS NOTES
Dialysis Post Treatment Report:     -Access Assessment  No s/s infection     -Ultrafiltration   UF Goal 2500   Prime (-) 300   NS Flush (-) 200   Other (-/+)    Total UF Removed 2000     -Medications / Blood Administration  Medications Given (Y/N) y   Blood Products (Y/N) n     Narrative:  Pt. tolerated treatment. Albumin 25GM ivp for hypotension.

## 2018-02-24 NOTE — CARE COORDINATION
ONGOING DISCHARGE PLAN:    Plan remains for patient to be discharged home with [facility]. Haim Stands from [address] [place] [address] following for [facility], [facility], and hosp bed. HD slot obtained at [facility] at 1130am.  Pt remains on HFNC. Will continue to follow.     Electronically signed by Tawanda Weir RN on 2/24/2018 at 4:42 PM

## 2018-02-24 NOTE — PLAN OF CARE
Problem: Breathing Pattern - Ineffective:  Goal: Ability to achieve and maintain a regular respiratory rate will improve  Ability to achieve and maintain a regular respiratory rate will improve   Outcome: Ongoing      Problem: Falls - Risk of  Goal: Absence of falls  Outcome: Ongoing      Problem: Pain:  Goal: Control of acute pain  Control of acute pain   Outcome: Ongoing

## 2018-02-24 NOTE — PROGRESS NOTES
Pain Management Inpatient Progress Note      Patient: Jackie Nam    Location: 2012/2012-01    CHIEF COMPLAINT: Bilateral leg pain    HISTORY OF PRESENT ILLNESS:    The patient is a 75 y.o. male who is being seen  for  bilateral leg pain and neuropathy     He is complaining of pain in the feet bilaterally from neuropathy. His Lyrica was increased to 50 mg twice daily patient reports he is certainly neuropathy pain is better controlled with this dose. He is overall satisfied with the relief of pain he is getting now. Patient has been transferred to ICU due to respiratory distress. He is also on dialysis.             Past Medical History:        Diagnosis Date    Abdominal pain, chronic, generalized     Cervicodynia     Chronic back pain     Chronic kidney disease     secondary to renal vascular disease    Cigarette smoker     COPD (chronic obstructive pulmonary disease) (HCC)     DDD (degenerative disc disease), lumbar     Depression     Family history of cancer     Hernia, ventral     Hyperlipidemia     Iron deficiency anemia     Lichen simplex chronicus     PUD (peptic ulcer disease)     Renal cyst     Type II or unspecified type diabetes mellitus without mention of complication, not stated as uncontrolled     Vitamin D deficiency        Past Surgical History:        Procedure Laterality Date    ABDOMINAL AORTIC ANEURYSM REPAIR      CO INSERT NON-TUNNEL CV CATH Right 2/19/2018    CATHETER INSERTION HEMODIALYSIS performed by Justice Bermudez DO at [facility] Medications:   Prior to Admission medications    Medication Sig Start Date End Date Taking?  Authorizing Provider   carvedilol (COREG) 6.25 MG tablet Take 6.25 mg by mouth 2 times daily (with meals)   Yes Historical Provider, MD   hydrALAZINE (APRESOLINE) 10 MG tablet Take 10 mg by mouth 3 times daily   Yes Historical Provider, MD   sodium bicarbonate 650 MG tablet Take 650 mg by mouth 2 times daily   Yes Historical Provider, MD family history. REVIEW OF SYSTEMS:  Review of Systems   Constitutional: Negative for chills and fever. HENT: Negative for congestion and hearing loss. Eyes: Negative for blurred vision and photophobia. Respiratory: Positive for cough and shortness of breath. Cardiovascular: Negative for chest pain and palpitations. Gastrointestinal: Positive for abdominal pain. Genitourinary:        On dialysis   Musculoskeletal:        Pain in lower extremities   Neurological:        Bilateral leg pain from neuropathy   Psychiatric/Behavioral: Negative for depression and substance abuse. PHYSICAL EXAM:  Vital Signs:  BP (!) 104/40   Pulse 89   Temp 98.9 °F (37.2 °C)   Resp 17   Ht 5' 8\" (1.727 m)   Wt 121 lb 14.6 oz (55.3 kg)   SpO2 94%   BMI 18.54 kg/m²     Physical Exam   Constitutional: He is oriented to person, place, and time. He appears well-developed and well-nourished. HENT:   Head: Normocephalic and atraumatic. Eyes: Conjunctivae and EOM are normal. Pupils are equal, round, and reactive to light. Neck: Neck supple. No tracheal deviation present. No thyromegaly present. Cardiovascular: Normal rate and regular rhythm. Pulmonary/Chest: He has wheezes. short of breath on O2 by nasal cannula   Abdominal: Soft. He exhibits no distension. Neurological: He is alert and oriented to person, place, and time. He displays no atrophy. No cranial nerve deficit or sensory deficit. He exhibits normal muscle tone. Reflex Scores:       Patellar reflexes are 0 on the left side. Achilles reflexes are 0 on the right side and 0 on the left side. There is  allodynia and hyperesthesia and hyperpathia in the feet bilaterally   Skin: Skin is warm and dry. Psychiatric: He has a normal mood and affect.  His speech is normal and behavior is normal. Thought content normal.     Ortho Exam      DATA:    2/24/2018  4:48 AM - Jeremiah, Irenepn Incoming Lab Results From [facility] [employer] [facility]

## 2018-02-24 NOTE — PROGRESS NOTES
Discussed with nurse  Transferred to ICU as he desaturated with dialysis  Tunneled catheter site clean and dry

## 2018-02-24 NOTE — PROGRESS NOTES
Cardiovascular: Negative for chest pain, leg swelling and PND. Gastrointestinal: Positive for abdominal pain. Negative for diarrhea, nausea and vomiting. Genitourinary: Negative for dysuria. Musculoskeletal: Positive for joint pain (Pain in R heel). Negative for myalgias. Neurological: Negative for dizziness and weakness. Medications: Allergies: Allergies   Allergen Reactions    Codeine     Contrast [Iodides]      IVP dyes       Current Meds:   Scheduled Meds:    clotrimazole   Topical BID    ipratropium-albuterol  1 ampule Inhalation Q4H    methylPREDNISolone  40 mg Intravenous Q8H    pregabalin  50 mg Oral BID    linezolid  600 mg Oral 2 times per day    heparin (porcine)  5,000 Units Subcutaneous 2 times per day    tamsulosin  0.4 mg Oral Daily    darbepoetin trang-polysorbate  100 mcg Subcutaneous Weekly    hydrALAZINE  50 mg Oral 3 times per day    isosorbide mononitrate  30 mg Oral Daily    carvedilol  6.25 mg Oral BID WC    heparin (porcine)  10,000 Units Intercatheter Once    famotidine (PEPCID) injection  10 mg Intravenous Daily    insulin lispro  0-6 Units Subcutaneous TID WC    insulin lispro  0-3 Units Subcutaneous Nightly    docusate sodium  200 mg Oral Daily    sodium chloride flush  10 mL Intravenous 2 times per day    mometasone-formoterol  2 puff Inhalation BID    sodium chloride flush  10 mL Intravenous Q12H     Continuous Infusions:    dextrose       PRN Meds: albumin human, heparin (porcine), heparin (porcine), diazepam, oxyCODONE-acetaminophen **AND** oxyCODONE, fentanNYL, hydrALAZINE, sodium chloride flush, acetaminophen, magnesium hydroxide, bisacodyl, ondansetron, nicotine, albuterol, glucose, dextrose, glucagon (rDNA), dextrose, ipratropium-albuterol, sodium chloride flush    Data:     Past Medical History:   has a past medical history of Abdominal pain, chronic, generalized; Cervicodynia;  Chronic back pain; Chronic kidney disease; Cigarette colon. No dilated loops of bowel, or findings to suggest obstruction. No mural thickening or adjacent inflammatory changes identified. An appendix is not confidently identified but there are no inflammatory changes identified in the right lower quadrant of the abdomen. . Peritoneum/retroperitoneum:  No lymphadenopathy, free fluid or free air identified in the abdomen or pelvis. Stable findings associated with patient's axillary bifemoral graft with the visualized component of the graft descending along the left chest wall and left flank and a communicating with the left femoral artery with bridge connecting to the right femoral artery. There is focal occlusion of the abdominal aorta inferior to the take-off of the bilateral renal arteries. No retroperitoneal or intramuscular hematoma identified. Pelvis: Manzanares catheter in the urinary bladder. Normal noncontrast CT appearance of the prostate. . The urinary bladder is unremarkable. Bones/soft tissues: Similar appearance of increased sclerosis of the left femoral head may reflect sequelae of prior AVN. No suspicious osseous lesions are identified. 1. No retroperitoneal or intramuscular hematoma. 2. There is new nonspecific prominence of the main portal vein. Could consider Doppler integration for further evaluation, as indicated. 3. There are centrilobular emphysematous changes in the lung bases with peripherally oriented nodular opacities, may reflect atelectatic changes, but true lung nodules are not excluded. Nonemergent chest CT is recommended for further evaluation. 4. Other stable findings, as above. Xr Chest (single View Frontal)    Result Date: 2/19/2018  EXAMINATION: SPOT FLUOROSCOPIC IMAGES 2/19/2018 8:36 am TECHNIQUE: Fluoroscopy was provided by the radiology department for procedure. Radiologist was not present during examination. FLUOROSCOPY DOSE AND TYPE OR TIME AND EXPOSURES: 8 seconds. 1 image.  COMPARISON: None HISTORY: Intra procedural are again seen. Cardiomegaly. No pulmonary edema. Diffuse osteopenia with thoracic spine degenerative changes. Small pleural effusions. Increased right hilar density may reflect underlying adenopathy. Follow-up with chest CT may be helpful. Xr Femur Right (min 2 Views)    Result Date: 2/12/2018  EXAMINATION: 2 VIEWS OF THE RIGHT FEMUR 2/12/2018 9:41 am COMPARISON: None. HISTORY: ORDERING SYSTEM PROVIDED HISTORY: fall TECHNOLOGIST PROVIDED HISTORY: portable Reason for exam:->fall Ordering Physician Provided Reason for Exam: fall, right femur pain Acuity: Unknown Type of Exam: Unknown FINDINGS: Two views right femur obtained portably show no convincing evidence of acute fracture or dislocation. Mild degenerative changes of the hip. There are vascular calcifications. No radiopaque soft tissue foreign bodies. No localized soft tissue swelling is seen. Surgical clip in the pelvis. If the hip is the area of specific clinical concern dedicated views are recommended. No convincing evidence of acute fracture. Ct Chest Wo Contrast    Result Date: 2/6/2018  EXAMINATION: CT OF THE CHEST WITHOUT CONTRAST 2/6/2018 2:41 pm TECHNIQUE: CT of the chest was performed without the administration of intravenous contrast. Multiplanar reformatted images are provided for review. Dose modulation, iterative reconstruction, and/or weight based adjustment of the mA/kV was utilized to reduce the radiation dose to as low as reasonably achievable. COMPARISON: January 8, 2016 HISTORY: ORDERING SYSTEM PROVIDED HISTORY: ACUTE RESP ILLNESS, >36YEARS OLD TECHNOLOGIST PROVIDED HISTORY: Ordering Physician Provided Reason for Exam: acute respiratory failure Acuity: Unknown Type of Exam: Unknown FINDINGS: Evaluation is limited by motion. Mediastinum: Cardiomegaly. No pericardial effusion. Coronary artery calcifications are seen. No mediastinal or hilar adenopathy is seen within limitations of a noncontrast study.   Atherosclerotic placement TECHNOLOGIST PROVIDED HISTORY: patient is moving to ICU Reason for exam:->line placement Ordering Physician Provided Reason for Exam: right side line placement Acuity: Unknown Type of Exam: Unknown FINDINGS: Interval placement of right-sided IJ line with tip in the SVC. No pneumothorax. Heart and mediastinal structures appear unchanged. COPD changes and vascular congestion and trace bilateral pleural effusions persists. Interval placement of right-sided IJ line with tip in the SVC. No pneumothorax. No significant change chest findings. Xr Chest Portable    Result Date: 2/8/2018  EXAMINATION: SINGLE VIEW OF THE CHEST 2/8/2018 9:05 am COMPARISON: February 5, 2018 HISTORY: ORDERING SYSTEM PROVIDED HISTORY: Crackles on Auscultation TECHNOLOGIST PROVIDED HISTORY: Reason for exam:->Crackles on Auscultation Ordering Physician Provided Reason for Exam: crackles Acuity: Unknown Type of Exam: Unknown FINDINGS: The cardiomediastinal silhouette is stable. There is flattening of the diaphragms, likely related to COPD. There is mild pulmonary vascular congestion. There is mild atelectasis and small pleural effusions at the bilateral lung bases. No evidence of pneumothorax. The osseous structures are stable. Mild cardiomegaly. Mild pulmonary vascular congestion. Probable COPD. Mild atelectasis and small pleural effusions at the bilateral lung bases. Xr Chest Portable    Result Date: 2/6/2018  EXAMINATION: SINGLE VIEW OF THE CHEST 2/6/2018 6:52 am COMPARISON: 02/05/2018 HISTORY: ORDERING SYSTEM PROVIDED HISTORY: AECOPD TECHNOLOGIST PROVIDED HISTORY: Reason for exam:->AECOPD Acuity: Unknown Type of Exam: Unknown Additional signs and symptoms: Following COPD. FINDINGS: Stable mild cardiomegaly and mild pulmonary vascular congestion. Pulmonary emphysema with bilateral fibrotic changes. Bilateral apical pleural thickening. Emphysematous bulla in the right lung base.   Bilateral prominent yinka consistent with enlargement of the central pulmonary arteries. Right suprahilar parenchymal consolidation extending into the right upper lobe, unchanged. Blunting of the costophrenic angle suggesting small effusions. No pneumothorax. No significant interval radiographic change. Vl Dup Lower Extremity Arteries Bilateral    Result Date: 2/15/2018    [facility]  Vascular Lower Extremities Arterial Duplex Procedure   Patient Name  [facility]  Date of Study           02/15/2018   Date of Birth 1950     Gender                  Male   Age           75 year(s)     Race                    Other   Room Number   2007           Height:                 67.72 inch, 172 cm   Corporate ID  [id number]     Weight:                 128 pounds, 58 kg  #   Patient Acct  [de-identified]  BSA:        1.69 m^2    BMI:      19.61  #                                                              kg/m^2   MR #          [id number]         Sonographer             Casper Amador   Accession #   [id number]      Interpreting Physician  Gabino Lopes   Referring                    Referring Physician     Delbert Callejas  Nurse  Practitioner  Procedure Type of Study:   Extremities Arteries: Lower Extremities Arterial Duplex, Arterial Scan  Lower Bilateral.  Indications for Study:Absent pulses. Patient Status: In Patient. Technical Quality:Limited visualization. Limitation reason:Patient movement. Comments:Incidental finding: Patient appears to have a femoral to femoral bypass. Bypass appears to be totally occluded when imaged with B-Mode. Bypass has no detectable velocities. Conclusions   Summary   Simultaneous real time imaging utilizing B-Mode, color doppler and  spectral waveform analysis was performed on bilateral lower extremities  for arterial examination: Study demonstrates:   ABNORMAL STUDY   Occluded femoral to femoral bypass graft.    Severely reduced velocities with monophasic waveforms in the external  iliac, common [id number]  Weight:                 128 pounds, 58 kg   Patient Acct # [de-identified]   BSA:        1.69 m^2    BMI:      19.61 kg/m^2   MR #           [id number]      Sonographer             Joe Meek RVT   Accession #    [id number]   Interpreting Physician  Shanda Hammonds   Referring                  Referring Physician     Tom Ohara  Nurse  Practitioner  Procedure Type of Study:   Veins: Lower Extremities DVT Study, Venous Scan Lower Bilateral.  Indications for Study:R/O DVT. Patient Status: In Patient. Technical Quality:Limited visualization. Comments:Exam performed by [address] [place] [address] 13. Conclusions   Summary   No evidence of superficial or deep venous thrombosis in both lower  extremities. Technically limited visualization. Signature   ----------------------------------------------------------------  Electronically signed by Beatrice Bloch Farooq(Interpreting  physician) on 02/13/2018 08:51 PM  ----------------------------------------------------------------   ----------------------------------------------------------------  Electronically signed by Joe Meek RVT(Sonographer) on  02/13/2018 05:44 PM  ----------------------------------------------------------------  Findings:   Right Impression:                    Left Impression:  The common femoral, femoral,         The common femoral, femoral,  popliteal and tibial veins           popliteal and tibial veins  demonstrate normal compressibility   demonstrate normal compressibility  and augmentation. and augmentation. Limited visualization of the         Limited visualization of the  posterior tibial and peroneal veins. posterior tibial and peroneal veins. Normal compressibility of the great  Normal compressibility of the great  saphenous vein. saphenous vein. Normal compressibility of the small  Normal compressibility of the small  saphenous vein. saphenous vein. Velocities are measured in cm/s ; Diameters are measured in mm Right Lower Extremities DVT Study Measurements Right 2D Measurements +------------------------------------+----------+---------------+----------+ ! Location                            ! Visualized! Compressibility! Thrombosis! +------------------------------------+----------+---------------+----------+ ! Common Femoral                      !Yes       ! Yes            ! None      ! +------------------------------------+----------+---------------+----------+ ! Prox Femoral                        !Yes       ! Yes            ! None      ! +------------------------------------+----------+---------------+----------+ ! Mid Femoral                         !Yes       ! Yes            ! None      ! +------------------------------------+----------+---------------+----------+ ! Dist Femoral                        !Yes       ! Yes            ! None      ! +------------------------------------+----------+---------------+----------+ ! Deep Femoral                        !Yes       ! Yes            ! None      ! +------------------------------------+----------+---------------+----------+ ! Popliteal                           !Yes       ! Yes            ! None      ! +------------------------------------+----------+---------------+----------+ ! Sapheno Femoral Junction            ! Yes       ! Yes            ! None      ! +------------------------------------+----------+---------------+----------+ ! PTV                                 ! Partial   !Yes            ! None      ! +------------------------------------+----------+---------------+----------+ ! Peroneal                            !Partial   !Yes            ! None      ! +------------------------------------+----------+---------------+----------+ ! Gastroc                             ! Yes       ! Yes            ! None      ! +------------------------------------+----------+---------------+----------+ ! GSV Thigh                           ! Yes !Amplitude       ! +---------------+---------------+------------+------------+----------------+ ! Calf           !136            !0.99        !            !                ! +---------------+---------------+------------+------------+----------------+ ! Ankle PT       !30             !0.22        !            !                ! +---------------+---------------+------------+------------+----------------+ ! Ankle DP       !39             !0.28        !            !                ! +---------------+---------------+------------+------------+----------------+   - Right NICO:0.28. Left Plethysmographic Results +---------------+---------------+------------+------------+----------------+ ! Left           !               !            !            !                ! +---------------+---------------+------------+------------+----------------+ ! Location       ! Pressure       ! Ratio       ! Notch       ! Amplitude       ! +---------------+---------------+------------+------------+----------------+ ! Calf           !49             !0.36        !            !                ! +---------------+---------------+------------+------------+----------------+ ! Ankle PT       !33             !0.24        !            !                ! +---------------+---------------+------------+------------+----------------+ ! Ankle DP       !33             !0.24        !            !                ! +---------------+---------------+------------+------------+----------------+   - Left Brachial Pressure:138. - Left NICO:0.24. Plethysmographic Digit Evaluation +---------++--------+-----+---------------++--------+-----+----------------+ ! ! !Right   ! ! Left           !!        !     !                ! +---------++--------+-----+---------------++--------+-----+----------------+ ! Location ! !Pressure! Ratio! PPG Wave Form  ! !Pressure! Ratio! PPG Wave Form   ! +---------++--------+-----+---------------++--------+-----+----------------+ ! Isha Howard !0.14 !               !!21      !0.15 !                ! +---------++--------+-----+---------------++--------+-----+----------------+        Physical Examination:        Physical Exam  Constitutional: Appears older than stated age, On Hi Flow Nasal Canula. He appears malnourished. He appears unhealthy. HENT:   Head: Normocephalic and atraumatic. Neck: Normal range of motion. Cardiovascular: Normal rate, regular rhythm and normal heart sounds.    Pulmonary/Chest: Effort normal. He has decreased breath sounds (Inspiratory Crackles, more pronounced today). Abdominal: Bowel sounds are normal. There is generalized tenderness. Large Ventral Hernia, Multiple Surgical Scars   Neurological: He is alert and oriented to person, place, and time. GCS score is 15. Skin: Skin is warm and dry.    Musculoskeletal: Significant weakness of RLL, concerning for spinal stenosis    Assessment:        Primary Problem  Acute on chronic respiratory failure with hypercapnia [facility])    Active Hospital Problems    Diagnosis Date Noted    [facility] [G62.9]     Thrombosis of left femoral-femoral bypass graft ([facility]) [N19.082C]     Gastrointestinal hemorrhage [K92.2]     Abdominal wall hernia [K43.9]     Absolute anemia [D64.9]     Acute on chronic respiratory failure with hypercapnia ([facility]) [J96.22] 02/08/2018    Acute on chronic systolic heart failure ([facility]) [I50.23] 02/08/2018    Drug-induced hyperglycemia [R73.9, T50.905A] 02/08/2018    Anemia [D64.9]     Abdominal aortic aneurysm (AAA) ([facility] [I71.4] 02/06/2018    COPD exacerbation ([facility]) [J44.1] 02/05/2018    Type 2 diabetes mellitus without complication, with long-term current use of insulin ([facility]) [E11.9, Z79.4]     Chronic kidney disease (CKD), stage IV (severe) ([facility] [N18.4]        Plan:      Placement: Pt Recommended for ECF, but family wants home with VNS     R Foot drop and RLL weakness, concerning for cervical stenosis or metastatic diseases to spine   - MRI

## 2018-02-25 ENCOUNTER — APPOINTMENT (OUTPATIENT)
Dept: GENERAL RADIOLOGY | Age: 68
DRG: 673 | End: 2018-02-25
Payer: COMMERCIAL

## 2018-02-25 LAB
ANION GAP SERPL CALCULATED.3IONS-SCNC: 11 MMOL/L (ref 9–17)
ANION GAP SERPL CALCULATED.3IONS-SCNC: 12 MMOL/L (ref 9–17)
BUN BLDV-MCNC: 44 MG/DL (ref 8–23)
BUN/CREAT BLD: ABNORMAL (ref 9–20)
CALCIUM SERPL-MCNC: 8.5 MG/DL (ref 8.6–10.4)
CHLORIDE BLD-SCNC: 94 MMOL/L (ref 98–107)
CHLORIDE BLD-SCNC: 95 MMOL/L (ref 98–107)
CO2: 25 MMOL/L (ref 20–31)
CO2: 25 MMOL/L (ref 20–31)
CREAT SERPL-MCNC: 2.99 MG/DL (ref 0.7–1.2)
FERRITIN: 602 UG/L (ref 30–400)
GFR AFRICAN AMERICAN: 25 ML/MIN
GFR NON-AFRICAN AMERICAN: 21 ML/MIN
GFR SERPL CREATININE-BSD FRML MDRD: ABNORMAL ML/MIN/{1.73_M2}
GFR SERPL CREATININE-BSD FRML MDRD: ABNORMAL ML/MIN/{1.73_M2}
GLUCOSE BLD-MCNC: 102 MG/DL (ref 75–110)
GLUCOSE BLD-MCNC: 115 MG/DL (ref 75–110)
GLUCOSE BLD-MCNC: 129 MG/DL (ref 75–110)
GLUCOSE BLD-MCNC: 131 MG/DL (ref 70–99)
GLUCOSE BLD-MCNC: 140 MG/DL (ref 75–110)
GLUCOSE BLD-MCNC: 161 MG/DL (ref 75–110)
HCT VFR BLD CALC: 22.7 % (ref 41–53)
HCT VFR BLD CALC: 25.2 % (ref 41–53)
HEMOGLOBIN: 7 G/DL (ref 13.5–17.5)
HEMOGLOBIN: 7.5 G/DL (ref 13.5–17.5)
IRON SATURATION: 31 % (ref 20–55)
IRON: 47 UG/DL (ref 59–158)
MCH RBC QN AUTO: 32.1 PG (ref 26–34)
MCHC RBC AUTO-ENTMCNC: 30.7 G/DL (ref 31–37)
MCV RBC AUTO: 104.4 FL (ref 80–100)
NRBC AUTOMATED: ABNORMAL PER 100 WBC
PDW BLD-RTO: 19.2 % (ref 11.5–14.9)
PLATELET # BLD: 100 K/UL (ref 150–450)
PMV BLD AUTO: 7.3 FL (ref 6–12)
POTASSIUM SERPL-SCNC: 5.5 MMOL/L (ref 3.7–5.3)
POTASSIUM SERPL-SCNC: 5.5 MMOL/L (ref 3.7–5.3)
PROCALCITONIN: 2.74 NG/ML
RBC # BLD: 2.17 M/UL (ref 4.5–5.9)
SODIUM BLD-SCNC: 131 MMOL/L (ref 135–144)
SODIUM BLD-SCNC: 131 MMOL/L (ref 135–144)
TOTAL IRON BINDING CAPACITY: 154 UG/DL (ref 250–450)
UNSATURATED IRON BINDING CAPACITY: 107 UG/DL (ref 112–347)
WBC # BLD: 8.7 K/UL (ref 3.5–11)

## 2018-02-25 PROCEDURE — 84145 PROCALCITONIN (PCT): CPT

## 2018-02-25 PROCEDURE — 85027 COMPLETE CBC AUTOMATED: CPT

## 2018-02-25 PROCEDURE — 6370000000 HC RX 637 (ALT 250 FOR IP): Performed by: INTERNAL MEDICINE

## 2018-02-25 PROCEDURE — 6360000002 HC RX W HCPCS: Performed by: INTERNAL MEDICINE

## 2018-02-25 PROCEDURE — 2500000003 HC RX 250 WO HCPCS: Performed by: STUDENT IN AN ORGANIZED HEALTH CARE EDUCATION/TRAINING PROGRAM

## 2018-02-25 PROCEDURE — 90937 HEMODIALYSIS REPEATED EVAL: CPT

## 2018-02-25 PROCEDURE — 36415 COLL VENOUS BLD VENIPUNCTURE: CPT

## 2018-02-25 PROCEDURE — 6370000000 HC RX 637 (ALT 250 FOR IP): Performed by: STUDENT IN AN ORGANIZED HEALTH CARE EDUCATION/TRAINING PROGRAM

## 2018-02-25 PROCEDURE — 6370000000 HC RX 637 (ALT 250 FOR IP): Performed by: NURSE PRACTITIONER

## 2018-02-25 PROCEDURE — 94762 N-INVAS EAR/PLS OXIMTRY CONT: CPT

## 2018-02-25 PROCEDURE — 82728 ASSAY OF FERRITIN: CPT

## 2018-02-25 PROCEDURE — 71045 X-RAY EXAM CHEST 1 VIEW: CPT

## 2018-02-25 PROCEDURE — 2060000000 HC ICU INTERMEDIATE R&B

## 2018-02-25 PROCEDURE — 85018 HEMOGLOBIN: CPT

## 2018-02-25 PROCEDURE — 94660 CPAP INITIATION&MGMT: CPT

## 2018-02-25 PROCEDURE — S0028 INJECTION, FAMOTIDINE, 20 MG: HCPCS | Performed by: STUDENT IN AN ORGANIZED HEALTH CARE EDUCATION/TRAINING PROGRAM

## 2018-02-25 PROCEDURE — 2580000003 HC RX 258: Performed by: NURSE PRACTITIONER

## 2018-02-25 PROCEDURE — 94640 AIRWAY INHALATION TREATMENT: CPT

## 2018-02-25 PROCEDURE — 80048 BASIC METABOLIC PNL TOTAL CA: CPT

## 2018-02-25 PROCEDURE — 6370000000 HC RX 637 (ALT 250 FOR IP): Performed by: FAMILY MEDICINE

## 2018-02-25 PROCEDURE — 83550 IRON BINDING TEST: CPT

## 2018-02-25 PROCEDURE — 85014 HEMATOCRIT: CPT

## 2018-02-25 PROCEDURE — 2580000003 HC RX 258: Performed by: INTERNAL MEDICINE

## 2018-02-25 PROCEDURE — 83540 ASSAY OF IRON: CPT

## 2018-02-25 PROCEDURE — 80051 ELECTROLYTE PANEL: CPT

## 2018-02-25 PROCEDURE — 82947 ASSAY GLUCOSE BLOOD QUANT: CPT

## 2018-02-25 PROCEDURE — 99233 SBSQ HOSP IP/OBS HIGH 50: CPT | Performed by: INTERNAL MEDICINE

## 2018-02-25 RX ORDER — SODIUM POLYSTYRENE SULFONATE 15 G/60ML
30 SUSPENSION ORAL; RECTAL ONCE
Status: COMPLETED | OUTPATIENT
Start: 2018-02-25 | End: 2018-02-25

## 2018-02-25 RX ORDER — FAMOTIDINE 20 MG/1
20 TABLET, FILM COATED ORAL DAILY
Status: DISCONTINUED | OUTPATIENT
Start: 2018-02-26 | End: 2018-02-27 | Stop reason: HOSPADM

## 2018-02-25 RX ORDER — DEXTROSE MONOHYDRATE 25 G/50ML
25 INJECTION, SOLUTION INTRAVENOUS ONCE
Status: COMPLETED | OUTPATIENT
Start: 2018-02-25 | End: 2018-02-25

## 2018-02-25 RX ADMIN — SODIUM POLYSTYRENE SULFONATE 30 G: 15 SUSPENSION ORAL; RECTAL at 12:31

## 2018-02-25 RX ADMIN — PREGABALIN 50 MG: 50 CAPSULE ORAL at 08:43

## 2018-02-25 RX ADMIN — OXYCODONE HYDROCHLORIDE AND ACETAMINOPHEN 1 TABLET: 5; 325 TABLET ORAL at 22:09

## 2018-02-25 RX ADMIN — IPRATROPIUM BROMIDE AND ALBUTEROL SULFATE 1 AMPULE: .5; 3 SOLUTION RESPIRATORY (INHALATION) at 08:03

## 2018-02-25 RX ADMIN — OXYCODONE HYDROCHLORIDE AND ACETAMINOPHEN 1 TABLET: 5; 325 TABLET ORAL at 05:30

## 2018-02-25 RX ADMIN — TAMSULOSIN HYDROCHLORIDE 0.4 MG: 0.4 CAPSULE ORAL at 08:43

## 2018-02-25 RX ADMIN — OXYCODONE HYDROCHLORIDE 5 MG: 5 TABLET ORAL at 05:30

## 2018-02-25 RX ADMIN — CLOTRIMAZOLE: 10 CREAM TOPICAL at 21:50

## 2018-02-25 RX ADMIN — IPRATROPIUM BROMIDE AND ALBUTEROL SULFATE 1 AMPULE: .5; 3 SOLUTION RESPIRATORY (INHALATION) at 10:54

## 2018-02-25 RX ADMIN — HEPARIN SODIUM 1700 UNITS: 1000 INJECTION, SOLUTION INTRAVENOUS; SUBCUTANEOUS at 23:24

## 2018-02-25 RX ADMIN — CLOTRIMAZOLE: 10 CREAM TOPICAL at 12:23

## 2018-02-25 RX ADMIN — Medication 2 PUFF: at 08:44

## 2018-02-25 RX ADMIN — METHYLPREDNISOLONE SODIUM SUCCINATE 40 MG: 40 INJECTION, POWDER, LYOPHILIZED, FOR SOLUTION INTRAMUSCULAR; INTRAVENOUS at 14:46

## 2018-02-25 RX ADMIN — PREGABALIN 50 MG: 50 CAPSULE ORAL at 21:59

## 2018-02-25 RX ADMIN — IPRATROPIUM BROMIDE AND ALBUTEROL SULFATE 1 AMPULE: .5; 3 SOLUTION RESPIRATORY (INHALATION) at 14:58

## 2018-02-25 RX ADMIN — OXYCODONE HYDROCHLORIDE 5 MG: 5 TABLET ORAL at 01:40

## 2018-02-25 RX ADMIN — HEPARIN SODIUM 1800 UNITS: 1000 INJECTION, SOLUTION INTRAVENOUS; SUBCUTANEOUS at 23:24

## 2018-02-25 RX ADMIN — OXYCODONE HYDROCHLORIDE AND ACETAMINOPHEN 1 TABLET: 5; 325 TABLET ORAL at 01:40

## 2018-02-25 RX ADMIN — Medication 2 PUFF: at 21:49

## 2018-02-25 RX ADMIN — CARVEDILOL 6.25 MG: 6.25 TABLET, FILM COATED ORAL at 17:30

## 2018-02-25 RX ADMIN — HEPARIN SODIUM 5000 UNITS: 5000 INJECTION, SOLUTION INTRAVENOUS; SUBCUTANEOUS at 08:43

## 2018-02-25 RX ADMIN — FAMOTIDINE 10 MG: 10 INJECTION, SOLUTION INTRAVENOUS at 08:44

## 2018-02-25 RX ADMIN — IPRATROPIUM BROMIDE AND ALBUTEROL SULFATE 1 AMPULE: .5; 3 SOLUTION RESPIRATORY (INHALATION) at 20:45

## 2018-02-25 RX ADMIN — ISOSORBIDE MONONITRATE 30 MG: 30 TABLET, EXTENDED RELEASE ORAL at 08:42

## 2018-02-25 RX ADMIN — METHYLPREDNISOLONE SODIUM SUCCINATE 40 MG: 40 INJECTION, POWDER, LYOPHILIZED, FOR SOLUTION INTRAMUSCULAR; INTRAVENOUS at 08:44

## 2018-02-25 RX ADMIN — INSULIN LISPRO 1 UNITS: 100 INJECTION, SOLUTION INTRAVENOUS; SUBCUTANEOUS at 21:59

## 2018-02-25 RX ADMIN — IPRATROPIUM BROMIDE AND ALBUTEROL SULFATE 1 AMPULE: .5; 3 SOLUTION RESPIRATORY (INHALATION) at 03:50

## 2018-02-25 RX ADMIN — HYDRALAZINE HYDROCHLORIDE 50 MG: 50 TABLET, FILM COATED ORAL at 05:30

## 2018-02-25 RX ADMIN — IPRATROPIUM BROMIDE AND ALBUTEROL SULFATE 1 AMPULE: .5; 3 SOLUTION RESPIRATORY (INHALATION) at 00:42

## 2018-02-25 RX ADMIN — Medication 10 ML: at 08:44

## 2018-02-25 RX ADMIN — Medication 10 ML: at 21:59

## 2018-02-25 RX ADMIN — HYDRALAZINE HYDROCHLORIDE 50 MG: 50 TABLET, FILM COATED ORAL at 14:46

## 2018-02-25 RX ADMIN — HEPARIN SODIUM 5000 UNITS: 5000 INJECTION, SOLUTION INTRAVENOUS; SUBCUTANEOUS at 21:49

## 2018-02-25 RX ADMIN — CARVEDILOL 6.25 MG: 6.25 TABLET, FILM COATED ORAL at 08:42

## 2018-02-25 RX ADMIN — LINEZOLID 600 MG: 600 TABLET, FILM COATED ORAL at 12:41

## 2018-02-25 RX ADMIN — INSULIN HUMAN 10 UNITS: 100 INJECTION, SOLUTION PARENTERAL at 12:23

## 2018-02-25 RX ADMIN — DEXTROSE MONOHYDRATE 25 G: 25 INJECTION, SOLUTION INTRAVENOUS at 12:23

## 2018-02-25 ASSESSMENT — PAIN DESCRIPTION - PAIN TYPE
TYPE: CHRONIC PAIN

## 2018-02-25 ASSESSMENT — PAIN DESCRIPTION - ORIENTATION: ORIENTATION: LEFT

## 2018-02-25 ASSESSMENT — PAIN DESCRIPTION - LOCATION
LOCATION: BACK
LOCATION: LEG
LOCATION: BACK
LOCATION: BACK

## 2018-02-25 ASSESSMENT — ENCOUNTER SYMPTOMS: SHORTNESS OF BREATH: 1

## 2018-02-25 ASSESSMENT — PAIN SCALES - GENERAL
PAINLEVEL_OUTOF10: 0
PAINLEVEL_OUTOF10: 9
PAINLEVEL_OUTOF10: 4
PAINLEVEL_OUTOF10: 2
PAINLEVEL_OUTOF10: 8
PAINLEVEL_OUTOF10: 8

## 2018-02-25 ASSESSMENT — PAIN DESCRIPTION - DESCRIPTORS: DESCRIPTORS: ACHING;SHARP;CRAMPING

## 2018-02-25 NOTE — PROGRESS NOTES
Nephrology Progress Note    Subjective/     Patient is being followed by nephrology for the management of acute kidney injury superimposed on chronic kidney disease stage 4. This is a 75 y.o. male active smoker of 2 PPD for many years, hyperlipidemia, COPD and chronic kidney disease stage 4 secondary to hypertension + type 2 DM (baseline creatinine of 2.6-2.8 mg/dl), who presented with decreased urine output and general weakness and was found to have a potassium of 6.0 and BUN/creatinne of 77/3.70 mg/dl. He was seen and examined on hemodialysis today and does not have shortness of breath but complains of back pain, bilateral knee and leg pain. Could not get MRI Lumbar spine because patient couldn't lie flat. He had a right IJ tunneled catheter placed on 2/19/2018. Has right foot drop and being investigated. Had ultrafiltration  yesterday-2 kg was removed  Potassium 5.5 kg today. Lethargic and intermittently confused-refused bi-pap last night. cxr shows increased lung opacities   which may represent pleural fluid and   atelectasis with underlying pneumonia not excluded       Objective/     Vitals:    02/25/18 0930 02/25/18 1000 02/25/18 1030 02/25/18 1054   BP: 120/63 113/60 (!) 119/51    Pulse: 90 92 94    Resp: 16 19 13 23   Temp:       TempSrc:       SpO2:    94%   Weight:       Height:         24HR INTAKE/OUTPUT:      Intake/Output Summary (Last 24 hours) at 02/25/18 1233  Last data filed at 02/24/18 1830   Gross per 24 hour   Intake              480 ml   Output             2300 ml   Net            -1820 ml     Patient Vitals for the past 96 hrs (Last 3 readings):   Weight   02/25/18 0445 121 lb 11.1 oz (55.2 kg)   02/24/18 1600 117 lb 15.1 oz (53.5 kg)   02/24/18 1250 121 lb 14.6 oz (55.3 kg)       Constitutional:  Alert, awake,but in apparent distress  Cardiovascular:  S1, S2 without m/r/g  Respiratory:  Diminished. Abdomen: +bs, soft, nt  Ext: Decreased LE edema  Right foot drop.     Data/  Recent Labs      02/23/18   0701  02/24/18   0416  02/25/18   0456   WBC  8.2  12.6*  8.7   HGB  8.1*  7.6*  7.0*   HCT  26.9*  24.5*  22.7*   MCV  105.2*  104.1*  104.4*   PLT  123*  97*  100*     Recent Labs      02/23/18   0701  02/24/18   0416  02/25/18   0456   NA  139  134*  131*   K  5.6*  4.6  5.5*   CL  101  98  95*   CO2  29  27  25   GLUCOSE  86  111*  131*   BUN  38*  24*  44*   CREATININE  2.73*  1.94*  2.99*   LABGLOM  23*  35*  21*   GFRAA  28*  42*  25*         Assessment/Plan:     1. Acute kidney injury superimposed on chronic kidney disease stage IV - will continue to monitor closely. Will maintain Monday/Wednesday/Friday hemodialysis schedule. There is no evidence of recovery of renal function. Target weight 55 kg. Consult social service to arrange for outpatient dialysis spot. 2. Dyspnea - secondary to acute exacerbation of chronic obstructive pulmonary disease as well as fluid overload secondary to decompensated systolic heart failure[EF 20-25%].      3. Anemia of chronic kidney disease - iron studies consistent with iron deficiency (Ferritin 153 and iron saturation 13%). Completed course of Venofer IV  Aranesp 100 mcg weekly.     4.  Cachexia with active smoking hx - r/o malignancy/multiple nodules on chest CT.     5.  Proteinuria (5 grams) -Secondary to diabetic nephropathy.     6. HyperkalemiaD50/Insulin and kayexelate 30 gm x one-recheck potassium at 3 pm.        Brunilda Dwyer MD, HANNAN  Attending Nephrologist

## 2018-02-25 NOTE — PROGRESS NOTES
Social/Spiritual/DNR/Disposition/Other   Keep in intermediate unit today    Critical Care Time   0 min    Electronically signed by Alo Humphreys MD on 2/25/2018 at 9:18 AM

## 2018-02-25 NOTE — PLAN OF CARE
Problem: Activity Intolerance:  Goal: Ability to tolerate increased activity will improve  Ability to tolerate increased activity will improve   Outcome: Ongoing  Patient work with PT today, see PT note    Problem: Falls - Risk of  Goal: Absence of falls  Outcome: Ongoing  No falls this shift. Patient alert and oriented    Problem: Pain:  Goal: Control of acute pain  Control of acute pain   Outcome: Ongoing  No complaints of pain this shift,    Problem: Risk for Impaired Skin Integrity  Goal: Tissue integrity - skin and mucous membranes  Structural intactness and normal physiological function of skin and  mucous membranes. Outcome: Ongoing  No skin breakdown this shift.

## 2018-02-25 NOTE — PROGRESS NOTES
[address] [address]    Progress Note    2/25/2018    7:33 AM    Name:   Tata Cordova  MRN:     [id number]     [place]:      [de-identified]   Room:   2012/2012-01  IP Day:  21  Admit Date:  2/5/2018  7:29 PM    PCP:   Amrita Almonte MD  Code Status:  Limited    Subjective:     C/C:   Chief Complaint   Patient presents with    Shortness of Breath     Interval History Status: not changed. Patient seen and examined this morning resting comfortably in bed having a breathing treatment. Patient remains on high flow. Patient continues to report left foot pain. Denies any other acute concerns. Brief History:     The patient is a 75 y. o. male wit hPMHx of COPD on 3 LPM, Systolic CHF, and CKD baseline Cr of 2.90 who presents with Shortness of Breath. Patient was at PCP on 2/1 for similar complaint, and was given Levaquin and medrol dose pack PO. At the time, he had a productive cough, and chest pain with coughing. He had no improvement in his Sx, and came to ED for revaluation. In the ED, his O2 sat was at 85% on 3 LPM. He was given 4 LPM by Venturi mask. Pulmonology was consulted. CXR showed small B/L Pleural effusions and R sided hilar density. His Cr was also elevated at 3.70, so nephrology was consulted. Hgb was 7.0 in ED and he was given 1 U PRBC. Pt denied SANJAY, but reports possible bloody stools. He was given IV solumedrol bolus, and admitted to ICU intermediate   for the management of  Acute on Chronic Respiratory Failure    Review of Systems:     Review of Systems   Constitutional: Negative for chills and fever. Respiratory: Positive for shortness of breath. Cardiovascular: Negative for chest pain. Musculoskeletal: Positive for joint pain (left ankle). Neurological: Negative for headaches. Medications: Allergies:     Allergies   Allergen Reactions    Codeine     Contrast [Iodides]      IVP dyes       Current Meds:

## 2018-02-25 NOTE — FLOWSHEET NOTE
02/25/18 1714   Provider Notification   Reason for Communication Review case   Provider Name Dr. Wiley Ford   Provider Notification Physician   Method of Communication Call   Response Other (Comment)   Notification Time [phone]. Range 2/25/2018 15:09   Sodium Latest Ref Range: 135 - 144 mmol/L 131 (L)   Potassium Latest Ref Range: 3.7 - 5.3 mmol/L 5.5 (H)   Chloride Latest Ref Range: 98 - 107 mmol/L 94 (L)   CO2 Latest Ref Range: 20 - 31 mmol/L 25   Anion Gap Latest Ref Range: 9 - 17 mmol/L 12   DR. Wiley Frod notified of patients repeat K 5.5. Patient will need dialysis, dialysis nurse notified to call Dr. Wiley Ford.

## 2018-02-26 LAB
ANION GAP SERPL CALCULATED.3IONS-SCNC: 11 MMOL/L (ref 9–17)
BUN BLDV-MCNC: 34 MG/DL (ref 8–23)
BUN/CREAT BLD: ABNORMAL (ref 9–20)
C-REACTIVE PROTEIN: 114.5 MG/L (ref 0–5)
CALCIUM SERPL-MCNC: 8.6 MG/DL (ref 8.6–10.4)
CHLORIDE BLD-SCNC: 100 MMOL/L (ref 98–107)
CO2: 25 MMOL/L (ref 20–31)
CREAT SERPL-MCNC: 2.47 MG/DL (ref 0.7–1.2)
DATE, STOOL #1: NORMAL
DATE, STOOL #2: NORMAL
DATE, STOOL #3: NORMAL
GFR AFRICAN AMERICAN: 32 ML/MIN
GFR NON-AFRICAN AMERICAN: 26 ML/MIN
GFR SERPL CREATININE-BSD FRML MDRD: ABNORMAL ML/MIN/{1.73_M2}
GFR SERPL CREATININE-BSD FRML MDRD: ABNORMAL ML/MIN/{1.73_M2}
GLUCOSE BLD-MCNC: 117 MG/DL (ref 70–99)
GLUCOSE BLD-MCNC: 129 MG/DL (ref 75–110)
GLUCOSE BLD-MCNC: 138 MG/DL (ref 75–110)
GLUCOSE BLD-MCNC: 95 MG/DL (ref 75–110)
HCT VFR BLD CALC: 24.1 % (ref 41–53)
HEMOCCULT SP1 STL QL: NEGATIVE
HEMOCCULT SP2 STL QL: NORMAL
HEMOCCULT SP3 STL QL: NORMAL
HEMOGLOBIN: 7.5 G/DL (ref 13.5–17.5)
MCH RBC QN AUTO: 31.8 PG (ref 26–34)
MCHC RBC AUTO-ENTMCNC: 31.3 G/DL (ref 31–37)
MCV RBC AUTO: 101.6 FL (ref 80–100)
NRBC AUTOMATED: ABNORMAL PER 100 WBC
PDW BLD-RTO: 19.1 % (ref 11.5–14.9)
PLATELET # BLD: 115 K/UL (ref 150–450)
PMV BLD AUTO: 8 FL (ref 6–12)
POTASSIUM SERPL-SCNC: 5.2 MMOL/L (ref 3.7–5.3)
PROCALCITONIN: 2.16 NG/ML
RBC # BLD: 2.38 M/UL (ref 4.5–5.9)
SODIUM BLD-SCNC: 136 MMOL/L (ref 135–144)
TIME, STOOL #1: 2050
TIME, STOOL #2: NORMAL
TIME, STOOL #3: NORMAL
WBC # BLD: 7.3 K/UL (ref 3.5–11)

## 2018-02-26 PROCEDURE — 84145 PROCALCITONIN (PCT): CPT

## 2018-02-26 PROCEDURE — 6370000000 HC RX 637 (ALT 250 FOR IP): Performed by: STUDENT IN AN ORGANIZED HEALTH CARE EDUCATION/TRAINING PROGRAM

## 2018-02-26 PROCEDURE — 6360000002 HC RX W HCPCS: Performed by: FAMILY MEDICINE

## 2018-02-26 PROCEDURE — 2580000003 HC RX 258: Performed by: NURSE PRACTITIONER

## 2018-02-26 PROCEDURE — 36415 COLL VENOUS BLD VENIPUNCTURE: CPT

## 2018-02-26 PROCEDURE — 90937 HEMODIALYSIS REPEATED EVAL: CPT

## 2018-02-26 PROCEDURE — 6360000002 HC RX W HCPCS: Performed by: INTERNAL MEDICINE

## 2018-02-26 PROCEDURE — 94762 N-INVAS EAR/PLS OXIMTRY CONT: CPT

## 2018-02-26 PROCEDURE — 94640 AIRWAY INHALATION TREATMENT: CPT

## 2018-02-26 PROCEDURE — 6370000000 HC RX 637 (ALT 250 FOR IP): Performed by: FAMILY MEDICINE

## 2018-02-26 PROCEDURE — 94660 CPAP INITIATION&MGMT: CPT

## 2018-02-26 PROCEDURE — 82947 ASSAY GLUCOSE BLOOD QUANT: CPT

## 2018-02-26 PROCEDURE — 6370000000 HC RX 637 (ALT 250 FOR IP): Performed by: INTERNAL MEDICINE

## 2018-02-26 PROCEDURE — 99232 SBSQ HOSP IP/OBS MODERATE 35: CPT | Performed by: NURSE PRACTITIONER

## 2018-02-26 PROCEDURE — G0328 FECAL BLOOD SCRN IMMUNOASSAY: HCPCS

## 2018-02-26 PROCEDURE — 86140 C-REACTIVE PROTEIN: CPT

## 2018-02-26 PROCEDURE — 97110 THERAPEUTIC EXERCISES: CPT

## 2018-02-26 PROCEDURE — 2060000000 HC ICU INTERMEDIATE R&B

## 2018-02-26 PROCEDURE — 99233 SBSQ HOSP IP/OBS HIGH 50: CPT | Performed by: INTERNAL MEDICINE

## 2018-02-26 PROCEDURE — 2580000003 HC RX 258: Performed by: EMERGENCY MEDICINE

## 2018-02-26 PROCEDURE — 85027 COMPLETE CBC AUTOMATED: CPT

## 2018-02-26 PROCEDURE — 6370000000 HC RX 637 (ALT 250 FOR IP): Performed by: NURSE PRACTITIONER

## 2018-02-26 PROCEDURE — 80048 BASIC METABOLIC PNL TOTAL CA: CPT

## 2018-02-26 PROCEDURE — P9046 ALBUMIN (HUMAN), 25%, 20 ML: HCPCS | Performed by: INTERNAL MEDICINE

## 2018-02-26 RX ORDER — METHYLPREDNISOLONE SODIUM SUCCINATE 40 MG/ML
20 INJECTION, POWDER, LYOPHILIZED, FOR SOLUTION INTRAMUSCULAR; INTRAVENOUS EVERY 8 HOURS
Status: DISCONTINUED | OUTPATIENT
Start: 2018-02-26 | End: 2018-02-27 | Stop reason: HOSPADM

## 2018-02-26 RX ADMIN — Medication 10 ML: at 13:42

## 2018-02-26 RX ADMIN — OXYCODONE HYDROCHLORIDE AND ACETAMINOPHEN 1 TABLET: 5; 325 TABLET ORAL at 13:47

## 2018-02-26 RX ADMIN — IPRATROPIUM BROMIDE AND ALBUTEROL SULFATE 1 AMPULE: .5; 3 SOLUTION RESPIRATORY (INHALATION) at 12:14

## 2018-02-26 RX ADMIN — Medication 2 PUFF: at 13:42

## 2018-02-26 RX ADMIN — HYDRALAZINE HYDROCHLORIDE 50 MG: 50 TABLET, FILM COATED ORAL at 05:36

## 2018-02-26 RX ADMIN — IPRATROPIUM BROMIDE AND ALBUTEROL SULFATE 1 AMPULE: .5; 3 SOLUTION RESPIRATORY (INHALATION) at 15:39

## 2018-02-26 RX ADMIN — ALBUMIN (HUMAN) 25 G: 0.25 INJECTION, SOLUTION INTRAVENOUS at 13:09

## 2018-02-26 RX ADMIN — PREGABALIN 50 MG: 50 CAPSULE ORAL at 09:31

## 2018-02-26 RX ADMIN — CLOTRIMAZOLE: 10 CREAM TOPICAL at 21:13

## 2018-02-26 RX ADMIN — CARVEDILOL 6.25 MG: 6.25 TABLET, FILM COATED ORAL at 17:51

## 2018-02-26 RX ADMIN — HYDRALAZINE HYDROCHLORIDE 50 MG: 50 TABLET, FILM COATED ORAL at 21:09

## 2018-02-26 RX ADMIN — TAMSULOSIN HYDROCHLORIDE 0.4 MG: 0.4 CAPSULE ORAL at 09:30

## 2018-02-26 RX ADMIN — HEPARIN SODIUM 5000 UNITS: 5000 INJECTION, SOLUTION INTRAVENOUS; SUBCUTANEOUS at 09:51

## 2018-02-26 RX ADMIN — ISOSORBIDE MONONITRATE 30 MG: 30 TABLET, EXTENDED RELEASE ORAL at 09:31

## 2018-02-26 RX ADMIN — IPRATROPIUM BROMIDE AND ALBUTEROL SULFATE 1 AMPULE: .5; 3 SOLUTION RESPIRATORY (INHALATION) at 03:39

## 2018-02-26 RX ADMIN — Medication 10 ML: at 16:03

## 2018-02-26 RX ADMIN — Medication 2 PUFF: at 21:09

## 2018-02-26 RX ADMIN — METHYLPREDNISOLONE SODIUM SUCCINATE 40 MG: 40 INJECTION, POWDER, LYOPHILIZED, FOR SOLUTION INTRAMUSCULAR; INTRAVENOUS at 09:39

## 2018-02-26 RX ADMIN — OXYCODONE HYDROCHLORIDE AND ACETAMINOPHEN 1 TABLET: 5; 325 TABLET ORAL at 08:30

## 2018-02-26 RX ADMIN — OXYCODONE HYDROCHLORIDE AND ACETAMINOPHEN 1 TABLET: 5; 325 TABLET ORAL at 05:35

## 2018-02-26 RX ADMIN — HEPARIN SODIUM 5000 UNITS: 5000 INJECTION, SOLUTION INTRAVENOUS; SUBCUTANEOUS at 21:09

## 2018-02-26 RX ADMIN — IPRATROPIUM BROMIDE AND ALBUTEROL SULFATE 1 AMPULE: .5; 3 SOLUTION RESPIRATORY (INHALATION) at 19:49

## 2018-02-26 RX ADMIN — CARVEDILOL 6.25 MG: 6.25 TABLET, FILM COATED ORAL at 09:31

## 2018-02-26 RX ADMIN — LINEZOLID 600 MG: 600 TABLET, FILM COATED ORAL at 00:01

## 2018-02-26 RX ADMIN — OXYCODONE HYDROCHLORIDE AND ACETAMINOPHEN 1 TABLET: 5; 325 TABLET ORAL at 21:12

## 2018-02-26 RX ADMIN — FENTANYL CITRATE 25 MCG: 50 INJECTION INTRAMUSCULAR; INTRAVENOUS at 09:30

## 2018-02-26 RX ADMIN — METHYLPREDNISOLONE SODIUM SUCCINATE 40 MG: 40 INJECTION, POWDER, FOR SOLUTION INTRAMUSCULAR; INTRAVENOUS at 17:51

## 2018-02-26 RX ADMIN — FENTANYL CITRATE 25 MCG: 50 INJECTION INTRAMUSCULAR; INTRAVENOUS at 13:47

## 2018-02-26 RX ADMIN — HYDRALAZINE HYDROCHLORIDE 50 MG: 50 TABLET, FILM COATED ORAL at 00:01

## 2018-02-26 RX ADMIN — IPRATROPIUM BROMIDE AND ALBUTEROL SULFATE 1 AMPULE: .5; 3 SOLUTION RESPIRATORY (INHALATION) at 00:16

## 2018-02-26 RX ADMIN — IPRATROPIUM BROMIDE AND ALBUTEROL SULFATE 1 AMPULE: .5; 3 SOLUTION RESPIRATORY (INHALATION) at 23:25

## 2018-02-26 RX ADMIN — FAMOTIDINE 20 MG: 20 TABLET, FILM COATED ORAL at 09:31

## 2018-02-26 RX ADMIN — DOCUSATE SODIUM 100 MG: 100 CAPSULE, LIQUID FILLED ORAL at 09:52

## 2018-02-26 RX ADMIN — LINEZOLID 600 MG: 600 TABLET, FILM COATED ORAL at 18:05

## 2018-02-26 RX ADMIN — METHYLPREDNISOLONE SODIUM SUCCINATE 40 MG: 40 INJECTION, POWDER, LYOPHILIZED, FOR SOLUTION INTRAMUSCULAR; INTRAVENOUS at 00:07

## 2018-02-26 RX ADMIN — HEPARIN SODIUM 1800 UNITS: 1000 INJECTION, SOLUTION INTRAVENOUS; SUBCUTANEOUS at 15:00

## 2018-02-26 RX ADMIN — IPRATROPIUM BROMIDE AND ALBUTEROL SULFATE 1 AMPULE: .5; 3 SOLUTION RESPIRATORY (INHALATION) at 08:19

## 2018-02-26 RX ADMIN — PREGABALIN 50 MG: 50 CAPSULE ORAL at 21:09

## 2018-02-26 RX ADMIN — HEPARIN SODIUM 1700 UNITS: 1000 INJECTION, SOLUTION INTRAVENOUS; SUBCUTANEOUS at 15:00

## 2018-02-26 ASSESSMENT — PAIN SCALES - GENERAL
PAINLEVEL_OUTOF10: 2
PAINLEVEL_OUTOF10: 2
PAINLEVEL_OUTOF10: 3
PAINLEVEL_OUTOF10: 7
PAINLEVEL_OUTOF10: 6
PAINLEVEL_OUTOF10: 7
PAINLEVEL_OUTOF10: 1
PAINLEVEL_OUTOF10: 6
PAINLEVEL_OUTOF10: 1
PAINLEVEL_OUTOF10: 5
PAINLEVEL_OUTOF10: 7

## 2018-02-26 ASSESSMENT — ENCOUNTER SYMPTOMS
NAUSEA: 0
SHORTNESS OF BREATH: 1
ABDOMINAL PAIN: 1
VOMITING: 0

## 2018-02-26 ASSESSMENT — PAIN DESCRIPTION - PAIN TYPE
TYPE: CHRONIC PAIN
TYPE: CHRONIC PAIN

## 2018-02-26 ASSESSMENT — PAIN DESCRIPTION - ORIENTATION
ORIENTATION: RIGHT;LEFT
ORIENTATION: RIGHT;LEFT

## 2018-02-26 ASSESSMENT — PAIN DESCRIPTION - LOCATION
LOCATION: LEG
LOCATION: LEG

## 2018-02-26 NOTE — PROGRESS NOTES
Results   Component Value Date    PHART 7.310 02/13/2018    PO2ART 65.8 02/13/2018    XLR0SQS 56.6 02/13/2018    Lab Results   Component Value Date    MODE 18/8 02/13/2018     Ionized Calcium:  No results found for: IONCA  Magnesium:    Lab Results   Component Value Date    MG 1.7 02/14/2018      Phosphorus:    Lab Results   Component Value Date    PHOS 6.6 02/06/2018        LIVER PROFILE No results for input(s): AST, ALT, LIPASE, BILIDIR, BILITOT, ALKPHOS in the last 72 hours. Invalid input(s): AMYLASE,  ALB  INR No results for input(s): INR in the last 72 hours. PTT No results for input(s): APTT in the last 72 hours. BNP No results for input(s): BNP in the last 72 hours. RADIOLOGY     (See actual reports for details)    ASSESSMENT/PLAN     1.  Acute exacerbation of COPD  2.  Worsening left-sided infiltrate and possible pneumonia  3.  Acute on Chronic respiratory failure with hypoxia, on home O2 at 3 liters. 4.  Hypercapnia   5.   bilateral lung nodules on CT chest   6.  Anemia, acute on chronic/thrombocytopenia  7.  Acute on Chronic kidney disease. 8.  Diabetes. 9.  Tobacco abuse up to 2 packs per day for 40 years, quit few weeks before admission        PLAN OF TREATMENT:    Follow-up chest x-ray in a.m.  aerosol treatment, Acapella  Antibiotic, check CRP and pro-calcitonin  Decrease  IV steroids    hemodialysis.   As per Nephrology  Needs PET scan as an outpatient  Decrease O2 as tolerated, BiPAP     Electronically signed by Neha Collins MD on 2/26/2018 at 11:17 AM

## 2018-02-26 NOTE — PLAN OF CARE
Problem: Discharge Planning:  Goal: Discharged to appropriate level of care  Discharged to appropriate level of care   Outcome: Ongoing  Talking with wife and patient in regards to assisted care or nursing home or ltac.      Problem: Falls - Risk of  Goal: Absence of falls  Outcome: Met This Shift      Problem: Pain:  Goal: Control of acute pain  Control of acute pain   Outcome: Ongoing      Problem: Nutrition  Goal: Optimal nutrition therapy  Outcome: Ongoing  Poor diet

## 2018-02-26 NOTE — PROGRESS NOTES
Nurse Signed Pain Management  Progress Notes Date of Service: 2/23/2018  7:48 AM         []Hide copied text  Pain Management Inpatient Progess  Note        Patient: Tata Cordova   2/26/2018  Location: 2098/2098-01     CHIEF COMPLAINT:  Burning leg pain for the past 2 weeks     HISTORY OF PRESENT ILLNESS:    The patient is a 75 y. o. male who is referred to pain clinic in consultation for neuropathic  pain by patient's physician.     HPI  Mr. Michael Day has a history of COPD, CHF abdominal aortic aneurysm repair complicated by bowel perforation and chronic back and abdominal pain. As a result he is chronic opioid therapy with percocet 1 tablet every 4 hours. He was transferred to ICU on 2-23-18 as he became hypoxic. On bipap ,. He denies any shortness of breath, He states the lyrica helps a little with the burning in his legs and feet. His current pain level is a \"9\". He states was just medicated for pain.   Femi Flight states he did not sleep well. He was admitted on 2/6/18 for management of acute respiratory failure, thought to be secondary to COPD exacerbation.     For the past 2 weeks however, he says that he's been unable to walk due to severe to bilateral symmetric lower extremity pain. The pain is intermittent, burning, tingling, worse with walking and better with rest. The pain does not radiate elsewhere, and it developed gradually over time.  He says that he has been on gabapentin in the past, but said that he had a 'bad reaction' that led to his being admitted to hospital. Interval History:  -Pain still persists with no improvement  Was increased to 50 mg  Pregablin  -Still says that unable to walk, mostly due to leg pain     Past Medical History:    Past Medical History                Diagnosis Date    Abdominal pain, chronic, generalized      Cervicodynia      Chronic back pain      Chronic kidney disease       secondary to renal vascular disease    Cigarette smoker      COPD (chronic obstructive pulmonary disease) ([facility] [address]      DDD (degenerative disc disease), lumbar      Depression      Family history of cancer      Hernia, ventral      Hyperlipidemia      Iron deficiency anemia      Lichen simplex chronicus      PUD (peptic ulcer disease)      Renal cyst      Type II or unspecified type diabetes mellitus without mention of complication, not stated as uncontrolled      Vitamin D deficiency               Past Surgical History:    Past Surgical History                 Procedure Laterality Date    ABDOMINAL AORTIC ANEURYSM REPAIR        [place] INSERT NON-TUNNEL CV CATH Right 2/19/2018     CATHETER INSERTION HEMODIALYSIS performed by Miri Jay DO at [address] Medications:   Home Medications                 Prior to Admission medications    Medication Sig Start Date End Date Taking?  Authorizing Provider   carvedilol (COREG) 6.25 MG tablet Take 6.25 mg by mouth 2 times daily (with meals)     Yes Historical Provider, MD   hydrALAZINE (APRESOLINE) 10 MG tablet Take 10 mg by mouth 3 times daily     Yes Historical Provider, MD   sodium bicarbonate 650 MG tablet Take 650 mg by mouth 2 times daily     Yes Historical Provider, MD   tamsulosin (FLOMAX) 0.4 MG capsule Take 0.4 mg by mouth daily     Yes Historical Provider, MD   oxyCODONE-acetaminophen (PERCOCET)  MG per tablet   1/8/16   Yes Historical Provider, MD   escitalopram (LEXAPRO) 20 MG tablet Take 20 mg by mouth daily.       Yes Historical Provider, MD   famotidine (PEPCID) 20 MG tablet Take 20 mg by mouth 2 times daily.       Yes Historical Provider, MD   aspirin 81 MG EC tablet Take 81 mg by mouth daily.       Yes Historical Provider, MD   Iron (FEOSOL PO) Take 325 mg by mouth 2 times daily.     Yes Historical Provider, MD   therapeutic multivitamin-minerals (THERAGRAN-M) tablet Take 1 tablet by mouth daily.       Yes Historical Provider, MD   rosuvastatin (CRESTOR) 20 MG tablet Take 20 mg by mouth daily.       Yes Historical Provider, rate.  Exam reveals no gallop and no friction rub.    Murmur heard. Pulmonary/Chest: Effort normal and breath sounds normal. .  He has coarse breath sounds. Rhonchi. Abdominal: Bowel sounds are normal. He exhibits distension. There is tenderness. Scar over adomen  Musculoskeletal:   Had gross movement of lower extremities bilaterally.   Neurological: He is alert and oriented to person, place, and time. Skin: Skin is warm and dry. Skin over lower extremities has 'burning' sensation upon palpation bilaterally up to the level of the knee No discoloration or swelling. Psychiatric: His behavior is normal. Thought content normal.                  DATA:  Old records have not been requested     IMPRESSION  1. Neuropathy ([facility] [address]    2. COPD exacerbation ([facility] [address]    3. Gastrointestinal hemorrhage, unspecified gastrointestinal hemorrhage type    4.  Anemia, unspecified type             RECOMMENDATIONS: per Dr Rodrigo Sahu I Pregabalin 50 mg bid  Will continue to follow     \

## 2018-02-26 NOTE — PROGRESS NOTES
Dialysis Post Treatment Report:     -Access Assessment  Catheter site C&D     -Ultrafiltration   UF Goal 2400   Prime (-) 400   NS Flush (-) 0   Other (-/+) 0   Total UF Removed 2000     -Medications / Blood Administration  Medications Given (Y/N) n   Blood Products (Y/N) n     Narrative:  No issues on treatment.

## 2018-02-26 NOTE — PROGRESS NOTES
Dialysis Safety Checks:    Patient ID Verified (Y/N) y     -Hepatitis Test                   Date      Result  Hepatitis B Surface Antigen   18 neg     Hepatitis B Surface Antibody       Hepatitis B Core Antibody        -Treatment Initiation  Blood Vol Processed Goal (Liters)  Pump Speed x Treatment Hours x 60 Minutes    Target Fluid Removal 1 to 1.5     * Intra-treatment documented Safety Checks include the followin) Access and face visible at all times. 2) All connections and blood lines are secure with no kinks. 3) NVL alarm engaged. 4) Hemosafe device applied (if applicable). 5) No collapse of Arterial or Venous blood chambers. 6) All blood lines / pump segments in the air detectors.   --------------------------------------------------------------------------------      Extra HD tx tonight for elevated K despite dextrose and insulin given, plan regular sched HD for tomorrow as well per Dr Michelle Bland

## 2018-02-26 NOTE — PROGRESS NOTES
[address] [facility]    Physical Therapy Progress Note    Date: 18  Patient Name: Wili Vega       Room:   MRN: [id number]   Account: [de-identified]   : 1950  (76 y.o.)   Gender: male     Referring Practitioner: Dr Ludivina High   Diagnosis: COPD - Acute on Chronic REpsiratory failure  plus Acute Renal failure with dialysis   Restrictions/Precautions: Fall Risk, General Precautions, Contact Precautions   Past Medical History:  has a past medical history of Abdominal pain, chronic, generalized; Cervicodynia; Chronic back pain; Chronic kidney disease; Cigarette smoker; COPD (chronic obstructive pulmonary disease) (HCC); DDD (degenerative disc disease), lumbar; Depression; Family history of cancer; Hernia, ventral; Hyperlipidemia; Iron deficiency anemia; Lichen simplex chronicus; PUD (peptic ulcer disease); Renal cyst; Type II or unspecified type diabetes mellitus without mention of complication, not stated as uncontrolled; and Vitamin D deficiency. Past Surgical History:   has a past surgical history that includes Abdominal aortic aneurysm repair and pr insert non-tunnel cv cath (Right, 2018). Overall Orientation Status: Within Normal Limits  Restrictions/Precautions  Restrictions/Precautions: Fall Risk;General Precautions;Contact Precautions    Subjective: Pt reports having pain but has not received pain medication at this time  Comments: Pt is currently receiving bedside dialysis. RN reports pt can perform ROM while having dialysis. Bedside treatment for ROM bilat LE & UE    Vital Signs  Patient Currently in Pain: Yes  Pain Assessment: 0-10  Pain Level: 7  Pain Type: Chronic pain  Pain Location: Leg  Pain Orientation: Right;Left  Pain Intervention(s): Repositioned; Ambulation/Increased activity; Distraction;RN notified (MARGARITA Bhatti Handing informed)  Response to Pain Intervention: None        Patient Observation  Observations: Receiving Dialysis bedside       Bed Mobility: Transfers:                                                                                            Other exercises 1: Supine bilat LE & UE AAROM x15  Other exercises 2: Heelcord stretch bilat 30sec x2           Activity Tolerance: Patient limited by endurance; Patient limited by fatigue;Patient limited by pain;Treatment limited secondary to medical complications (free text) (pt receiving dialysis)     Other Comments  Comments: Discussed pt having foot drop on right foot with Left foot very weak with dorsiflexion    Assessment  Activity Tolerance: Patient limited by endurance; Patient limited by fatigue;Patient limited by pain;Treatment limited secondary to medical complications (free text) (pt receiving dialysis)   Body structures, Functions, Activity limitations: Decreased functional mobility ; Decreased strength;Decreased endurance  Discharge Recommendations: Subacute/Skilled Nursing Facility     Type of devices: Call light within reach; Left in bed;Nurse notified (RN for dialysis in room, RN Claudia Begum aware)     Plan  Times per week: 5-7x/wk  Times per day: Daily  Current Treatment Recommendations: Strengthening, ROM, Functional Mobility Training, Transfer Training    Patient Education  New Education Provided: Nancy De Leónters, family and patient  Method: explanation       Outcome: acknowledged understanding of POC     Goals  Short term goals  Time Frame for Short term goals: 7-10 days  Short term goal 1: Bed mobility with min assist  Short term goal 2: Transfers with min assist  Short term goal 3: Gait with RW/Andrea x 10-20ft    PT Individual Minutes  Time In: 3454  Time Out: 9834  Minutes: 23    Electronically signed by Tania Benedict PTA on 2/26/18 at 4:34 PM

## 2018-02-26 NOTE — PROGRESS NOTES
nausea and vomiting. Genitourinary: Negative for dysuria. Neurological: Negative for dizziness and headaches. Medications: Allergies: Allergies   Allergen Reactions    Codeine     Contrast [Iodides]      IVP dyes       Current Meds:   Scheduled Meds:    famotidine  20 mg Oral Daily    clotrimazole   Topical BID    ipratropium-albuterol  1 ampule Inhalation Q4H    methylPREDNISolone  40 mg Intravenous Q8H    pregabalin  50 mg Oral BID    linezolid  600 mg Oral 2 times per day    heparin (porcine)  5,000 Units Subcutaneous 2 times per day    tamsulosin  0.4 mg Oral Daily    darbepoetin trang-polysorbate  100 mcg Subcutaneous Weekly    hydrALAZINE  50 mg Oral 3 times per day    isosorbide mononitrate  30 mg Oral Daily    carvedilol  6.25 mg Oral BID WC    heparin (porcine)  10,000 Units Intercatheter Once    insulin lispro  0-6 Units Subcutaneous TID WC    insulin lispro  0-3 Units Subcutaneous Nightly    docusate sodium  200 mg Oral Daily    sodium chloride flush  10 mL Intravenous 2 times per day    mometasone-formoterol  2 puff Inhalation BID    sodium chloride flush  10 mL Intravenous Q12H     Continuous Infusions:    dextrose       PRN Meds: albumin human, heparin (porcine), heparin (porcine), diazepam, oxyCODONE-acetaminophen **AND** oxyCODONE, fentanNYL, hydrALAZINE, sodium chloride flush, acetaminophen, magnesium hydroxide, bisacodyl, ondansetron, nicotine, albuterol, glucose, dextrose, glucagon (rDNA), dextrose, ipratropium-albuterol, sodium chloride flush    Data:     Past Medical History:   has a past medical history of Abdominal pain, chronic, generalized; Cervicodynia; Chronic back pain; Chronic kidney disease; Cigarette smoker; COPD (chronic obstructive pulmonary disease) (HCC); DDD (degenerative disc disease), lumbar; Depression; Family history of cancer; Hernia, ventral; Hyperlipidemia; Iron deficiency anemia;  Lichen simplex chronicus; PUD (peptic ulcer disease); +------------------------------------+----------+---------------+----------+ ! SSV                                 ! Yes       ! Yes            ! None      ! +------------------------------------+----------+---------------+----------+ Left Lower Extremities DVT Study Measurements Left 2D Measurements +------------------------------------+----------+---------------+----------+ ! Location                            ! Visualized! Compressibility! Thrombosis! +------------------------------------+----------+---------------+----------+ ! Common Femoral                      !Yes       ! Yes            ! None      ! +------------------------------------+----------+---------------+----------+ ! Prox Femoral                        !Yes       ! Yes            ! None      ! +------------------------------------+----------+---------------+----------+ ! Mid Femoral                         !Yes       ! Yes            ! None      ! +------------------------------------+----------+---------------+----------+ ! Dist Femoral                        !Yes       ! Yes            ! None      ! +------------------------------------+----------+---------------+----------+ ! Deep Femoral                        !Yes       ! Yes            ! None      ! +------------------------------------+----------+---------------+----------+ ! Popliteal                           !Yes       ! Yes            ! None      ! +------------------------------------+----------+---------------+----------+ ! Sapheno Femoral Junction            ! Yes       ! Yes            ! None      ! +------------------------------------+----------+---------------+----------+ ! PTV                                 ! Partial   !Yes            ! None      ! +------------------------------------+----------+---------------+----------+ ! Peroneal                            !Partial   !Yes            ! None      ! +------------------------------------+----------+---------------+----------+ ! Gastroc [I50.23] 02/08/2018    Drug-induced hyperglycemia [R73.9, T50.905A] 02/08/2018    Anemia [D64.9]     Abdominal aortic aneurysm (AAA) ([facility] [I71.4] 02/06/2018    COPD exacerbation (HCC) [J44.1] 02/05/2018    Type 2 diabetes mellitus without complication, with long-term current use of insulin (HCC) [E11.9, Z79.4]     Chronic kidney disease (CKD), stage IV (severe) ([facility] [N18.4]        Plan:        Patient Admitted to ICU    R Foot drop and RLL weakness, concerning for cervical stenosis or metastatic diseases to spine   - MRI Lumbar Spine, when respiratory status improved   - Can be done as outpatient if patient desires     Urinary Retention, diggs removed 2/19, replaced 2/21  - Patient does not want another SANJAY  - Tamsulosin 0.4 mg QD  - Per Urology, Keep Diggs in  - bladder scans: 90s-100s ml residual     Acute on Chronic Respiratory Failure, improving  - Pulm following: carmelita Byrd, PO Zyvox Day 9, IV Solumedrol 40 mg Q8h  - No Intubation, Pt is DNR-CCA  - He is tolerating O2 NC     Systolic CHF  - EF 69%  - S/p HD for hypervolemia  - Cardiology signed off     CKD Stage IV, ALBERTINA on CKD, s/p tunneled catheter  - Nephro following  - HD as outpatient  - M,W,F dialysis     Anemia of chronic disease +/- blood loss?, resolved  - GI following  - S/p 2 units pRBCs on 2/7, hgb ~ 7.5-8.5  - Hgb: 7.0 2/25/18  - continue to monitor; H/H at 1700  - Aranesp     HTN  -Coreg 6.25 BID, hydralazine 50 mg TID, Imdur 30mg QD     Hyperglycemia  - Check BS, SSI     R heel Pain  - Pain management on board  - Pregabalin 50 mg BID  - Clotrimazole lotion applied to heel     Nodules on CT Chest  - Smoking hx, 40 pack years  - Increasing lymphadenopathy since Jan 2016  - Outpatient follow up with pulm     Dispo: Placement; Pt Recommended for ECF, but family wants home with VNS    Saida Mukherjee MD  2/26/2018  10:07 AM       IM Attending    Pt seen and examined today   I have discussed the care of pt , including pertinent history and

## 2018-02-26 NOTE — PROGRESS NOTES
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 Pt is awake but falls asleep easily. He is not completely oriented at times but can hold a conversation and can answer questions appropriately when asked. He states he has pain in his buttocks and has been receiving pain medicine q4prn. Pt is frail and thin. He is on O2 n/c and sats range from 85-92%. I talked with patient about his wishes and told him if he ever wanted to do comfort care, he needs to let us know. He said he would like that. I explained that comfort care/hospice means he will not have dialysis anymore or come back to the hospital. He states he understands. Pt states, \"I don't have any quality of life like this\". Wife Alfonza Frankel is at bedside. She reiterates to the patient what I am saying about hospice care. Wife states, \"I totally support whatever he wants to do and so does our son\". I offered to have a hospice nurse come to talk to patient and family and patient is agreeable. [facility] [address] [place] [address] called and arranged a meeting for 11am tomorrow. Family updated.      Goals/Plan of care  Education/support to family  Education/support to patient  Discharge planning/helping to coordinate care  Communications with primary service  Providing support for coping/adaptation/distress of family  Providing support for coping/adaptation/distress of patient  Discussing meaning/purpose   Continue with current plan of care  Clarification of medical condition to patient and family  Code status clarified: [facility]  Provided information about hospice  [facility] [address] [place] [address] informational meeting set up for tomorrow at [address] [facility] [address] RN, [address] [facility] [place] Office: [address] [place] [address] Office:

## 2018-02-26 NOTE — PROGRESS NOTES
Nutrition Assessment    Type and Reason for Visit: Reassess    Nutrition Recommendations: Continue Renal Diet, low sodium, dental soft diet and ONS as ordered. Malnutrition Assessment:  · Malnutrition Status: Meets the criteria for moderate malnutrition  · Context: Acute illness or injury  · Findings of the 6 clinical characteristics of malnutrition (Minimum of 2 out of 6 clinical characteristics is required to make the diagnosis of moderate or severe Protein Calorie Malnutrition based on AND/ASPEN Guidelines):  1. Energy Intake-Less than or equal to 50%, greater than or equal to 5 days    2. Weight Loss-No significant weight loss,    3. Fat Loss-Mild subcutaneous fat loss, Orbital  4. Muscle Loss-Mild muscle mass loss, Temples (temporalis muscle)  5. Fluid Accumulation-No significant fluid accumulation, Extremities    Nutrition Diagnosis:   · Problem: in context of chronic illness  · Etiology: related to Renal dysfunction     Signs and symptoms:  as evidenced by Diet history of poor intake, Known losses from dialysis, Lab values    Nutrition Assessment:  · Subjective Assessment: Patient was eating breakfast at the time of visit, ate most of it except few bites. During ICU ID rounds RN reported patient had dialysis yesterday, has increased weakness and confusion which could be r/t his disease. Tolerating dental soft diet  · Nutrition-Focused Physical Findings: Constipation relieved with colace.  Edema: trace BLE, BUE  · Wound Type: Surgical Wound  · Current Nutrition Therapies:  · Oral Diet Orders: Renal, 2gm Sodium, Dental Soft, Fluid Restriction   · Oral Diet intake: %  · Oral Nutrition Supplement (ONS) Orders: Standard High Calorie Oral Supplement, Renal Supplement  · ONS intake: %  · Anthropometric Measures:  · Ht: 5' 8\" (172.7 cm)   · Current Body Wt: 117 lb 1 oz (53.1 kg)  · Admission Body Wt: 133 lb 13.1 oz (60.7 kg)  · Ideal Body Wt: 154 lb (69.9 kg), % Ideal Body 80%  · BMI Classification: BMI <18.5 Underweight  · Comparative Standards (Estimated Nutrition Needs):  · Estimated Daily Total Kcal: 1830 kcal  · Estimated Daily Protein (g): 79-92 gm    Estimated Intake vs Estimated Needs: Intake Meets Needs    Nutrition Risk Level: Moderate    Nutrition Interventions:   Continue current diet, Continue current ONS  Continued Inpatient Monitoring, Education Not Indicated    Nutrition Evaluation:   · Evaluation: Progressing toward goals   · Goals: Adequate nutrition intake or provision    · Monitoring: Meal Intake, Supplement Intake, Diet Tolerance, Skin Integrity, Fluid Balance, Weight, Mental Status/Confusion, Pertinent Labs, Chewing/Swallowing, Constipation    See Adult Nutrition Doc Flowsheet for more detail.      Judith Watt RD, LD  Office phone [phone]

## 2018-02-26 NOTE — PROGRESS NOTES
Bipap off.   Pt awake/alert NC 5L/m  SpO2 90%  Electronically signed by Maria M lucas RCP on 2/26/2018 at 8:24 AM

## 2018-02-27 ENCOUNTER — NURSE ONLY (OUTPATIENT)
Dept: CASE MANAGEMENT | Age: 68
End: 2018-02-27

## 2018-02-27 ENCOUNTER — APPOINTMENT (OUTPATIENT)
Dept: GENERAL RADIOLOGY | Age: 68
DRG: 673 | End: 2018-02-27
Payer: COMMERCIAL

## 2018-02-27 VITALS
OXYGEN SATURATION: 95 % | WEIGHT: 115.08 LBS | TEMPERATURE: 98.5 F | RESPIRATION RATE: 16 BRPM | BODY MASS INDEX: 17.44 KG/M2 | SYSTOLIC BLOOD PRESSURE: 137 MMHG | DIASTOLIC BLOOD PRESSURE: 51 MMHG | HEIGHT: 68 IN | HEART RATE: 95 BPM

## 2018-02-27 PROBLEM — E44.0 MODERATE PROTEIN-CALORIE MALNUTRITION (HCC): Status: ACTIVE | Noted: 2018-02-27

## 2018-02-27 LAB
ANION GAP SERPL CALCULATED.3IONS-SCNC: 11 MMOL/L (ref 9–17)
BUN BLDV-MCNC: 32 MG/DL (ref 8–23)
BUN/CREAT BLD: ABNORMAL (ref 9–20)
CALCIUM SERPL-MCNC: 8.8 MG/DL (ref 8.6–10.4)
CHLORIDE BLD-SCNC: 98 MMOL/L (ref 98–107)
CO2: 29 MMOL/L (ref 20–31)
CREAT SERPL-MCNC: 1.99 MG/DL (ref 0.7–1.2)
GFR AFRICAN AMERICAN: 41 ML/MIN
GFR NON-AFRICAN AMERICAN: 34 ML/MIN
GFR SERPL CREATININE-BSD FRML MDRD: ABNORMAL ML/MIN/{1.73_M2}
GFR SERPL CREATININE-BSD FRML MDRD: ABNORMAL ML/MIN/{1.73_M2}
GLUCOSE BLD-MCNC: 126 MG/DL (ref 75–110)
GLUCOSE BLD-MCNC: 135 MG/DL (ref 75–110)
GLUCOSE BLD-MCNC: 146 MG/DL (ref 70–99)
HCT VFR BLD CALC: 24.1 % (ref 41–53)
HEMOGLOBIN: 7.4 G/DL (ref 13.5–17.5)
MCH RBC QN AUTO: 32 PG (ref 26–34)
MCHC RBC AUTO-ENTMCNC: 30.9 G/DL (ref 31–37)
MCV RBC AUTO: 103.7 FL (ref 80–100)
NRBC AUTOMATED: ABNORMAL PER 100 WBC
PDW BLD-RTO: 19.2 % (ref 11.5–14.9)
PLATELET # BLD: 107 K/UL (ref 150–450)
PMV BLD AUTO: 7.6 FL (ref 6–12)
POTASSIUM SERPL-SCNC: 4.5 MMOL/L (ref 3.7–5.3)
RBC # BLD: 2.32 M/UL (ref 4.5–5.9)
SODIUM BLD-SCNC: 138 MMOL/L (ref 135–144)
WBC # BLD: 4.3 K/UL (ref 3.5–11)

## 2018-02-27 PROCEDURE — 6370000000 HC RX 637 (ALT 250 FOR IP): Performed by: FAMILY MEDICINE

## 2018-02-27 PROCEDURE — 6370000000 HC RX 637 (ALT 250 FOR IP): Performed by: STUDENT IN AN ORGANIZED HEALTH CARE EDUCATION/TRAINING PROGRAM

## 2018-02-27 PROCEDURE — 99231 SBSQ HOSP IP/OBS SF/LOW 25: CPT | Performed by: PAIN MEDICINE

## 2018-02-27 PROCEDURE — 2580000003 HC RX 258: Performed by: NURSE PRACTITIONER

## 2018-02-27 PROCEDURE — 99239 HOSP IP/OBS DSCHRG MGMT >30: CPT | Performed by: INTERNAL MEDICINE

## 2018-02-27 PROCEDURE — 6360000002 HC RX W HCPCS: Performed by: INTERNAL MEDICINE

## 2018-02-27 PROCEDURE — 6370000000 HC RX 637 (ALT 250 FOR IP): Performed by: NURSE PRACTITIONER

## 2018-02-27 PROCEDURE — 6360000002 HC RX W HCPCS: Performed by: FAMILY MEDICINE

## 2018-02-27 PROCEDURE — 6370000000 HC RX 637 (ALT 250 FOR IP): Performed by: INTERNAL MEDICINE

## 2018-02-27 PROCEDURE — 85027 COMPLETE CBC AUTOMATED: CPT

## 2018-02-27 PROCEDURE — 94762 N-INVAS EAR/PLS OXIMTRY CONT: CPT

## 2018-02-27 PROCEDURE — 71045 X-RAY EXAM CHEST 1 VIEW: CPT

## 2018-02-27 PROCEDURE — 36415 COLL VENOUS BLD VENIPUNCTURE: CPT

## 2018-02-27 PROCEDURE — 94640 AIRWAY INHALATION TREATMENT: CPT

## 2018-02-27 PROCEDURE — 82947 ASSAY GLUCOSE BLOOD QUANT: CPT

## 2018-02-27 PROCEDURE — 80048 BASIC METABOLIC PNL TOTAL CA: CPT

## 2018-02-27 RX ADMIN — OXYCODONE HYDROCHLORIDE AND ACETAMINOPHEN 1 TABLET: 5; 325 TABLET ORAL at 04:26

## 2018-02-27 RX ADMIN — ISOSORBIDE MONONITRATE 30 MG: 30 TABLET, EXTENDED RELEASE ORAL at 08:26

## 2018-02-27 RX ADMIN — IPRATROPIUM BROMIDE AND ALBUTEROL SULFATE 1 AMPULE: .5; 3 SOLUTION RESPIRATORY (INHALATION) at 07:09

## 2018-02-27 RX ADMIN — METHYLPREDNISOLONE SODIUM SUCCINATE 20 MG: 40 INJECTION, POWDER, FOR SOLUTION INTRAMUSCULAR; INTRAVENOUS at 08:26

## 2018-02-27 RX ADMIN — CARVEDILOL 6.25 MG: 6.25 TABLET, FILM COATED ORAL at 08:26

## 2018-02-27 RX ADMIN — FENTANYL CITRATE 25 MCG: 50 INJECTION INTRAMUSCULAR; INTRAVENOUS at 01:31

## 2018-02-27 RX ADMIN — FENTANYL CITRATE 25 MCG: 50 INJECTION INTRAMUSCULAR; INTRAVENOUS at 13:10

## 2018-02-27 RX ADMIN — CLOTRIMAZOLE: 10 CREAM TOPICAL at 08:26

## 2018-02-27 RX ADMIN — IPRATROPIUM BROMIDE AND ALBUTEROL SULFATE 1 AMPULE: .5; 3 SOLUTION RESPIRATORY (INHALATION) at 04:07

## 2018-02-27 RX ADMIN — IPRATROPIUM BROMIDE AND ALBUTEROL SULFATE 1 AMPULE: .5; 3 SOLUTION RESPIRATORY (INHALATION) at 10:55

## 2018-02-27 RX ADMIN — DOCUSATE SODIUM 200 MG: 100 CAPSULE, LIQUID FILLED ORAL at 08:26

## 2018-02-27 RX ADMIN — HEPARIN SODIUM 5000 UNITS: 5000 INJECTION, SOLUTION INTRAVENOUS; SUBCUTANEOUS at 08:31

## 2018-02-27 RX ADMIN — LINEZOLID 600 MG: 600 TABLET, FILM COATED ORAL at 00:43

## 2018-02-27 RX ADMIN — FAMOTIDINE 20 MG: 20 TABLET, FILM COATED ORAL at 08:26

## 2018-02-27 RX ADMIN — PREGABALIN 50 MG: 50 CAPSULE ORAL at 08:26

## 2018-02-27 RX ADMIN — LINEZOLID 600 MG: 600 TABLET, FILM COATED ORAL at 13:10

## 2018-02-27 RX ADMIN — METHYLPREDNISOLONE SODIUM SUCCINATE 20 MG: 40 INJECTION, POWDER, FOR SOLUTION INTRAMUSCULAR; INTRAVENOUS at 00:44

## 2018-02-27 RX ADMIN — TAMSULOSIN HYDROCHLORIDE 0.4 MG: 0.4 CAPSULE ORAL at 08:25

## 2018-02-27 RX ADMIN — HYDRALAZINE HYDROCHLORIDE 50 MG: 50 TABLET, FILM COATED ORAL at 07:19

## 2018-02-27 RX ADMIN — Medication 10 ML: at 08:28

## 2018-02-27 RX ADMIN — OXYCODONE HYDROCHLORIDE 5 MG: 5 TABLET ORAL at 04:27

## 2018-02-27 RX ADMIN — Medication 2 PUFF: at 08:26

## 2018-02-27 ASSESSMENT — PAIN SCALES - GENERAL
PAINLEVEL_OUTOF10: 7
PAINLEVEL_OUTOF10: 5
PAINLEVEL_OUTOF10: 7

## 2018-02-27 ASSESSMENT — ENCOUNTER SYMPTOMS
VOMITING: 0
SHORTNESS OF BREATH: 1
ORTHOPNEA: 0
ABDOMINAL PAIN: 1
COUGH: 0
HEMOPTYSIS: 0
COUGH: 1
BLURRED VISION: 0
PHOTOPHOBIA: 0
BACK PAIN: 1
SHORTNESS OF BREATH: 0
NAUSEA: 0
HEARTBURN: 0

## 2018-02-27 NOTE — DISCHARGE SUMMARY
EXPOSURES: 8 seconds. 1 image. COMPARISON: None HISTORY: Intra procedural imaging during hemodialysis catheter placement. FINDINGS: The single image is providing demonstrating dual-lumen hemodialysis catheter placement with the tips projecting in the region of the distal SVC. Intraprocedural fluoroscopic spot images as above. See separate procedure report for more information. Xr Chest Standard (2 Vw)    Result Date: 2/21/2018  EXAMINATION: TWO VIEWS OF THE CHEST 2/21/2018 1:46 pm COMPARISON: 02/19/2018 HISTORY: ORDERING SYSTEM PROVIDED HISTORY: re eval for congestion ----   please do after dialysis --- call with results TECHNOLOGIST PROVIDED HISTORY: Reason for exam:->re eval for congestion ----   please do after dialysis --- call with results Ordering Physician Provided Reason for Exam: F/u congestion Acuity: Acute Type of Exam: Ongoing Additional signs and symptoms: F/u congestion FINDINGS: Hyperexpanded lungs. Peribronchial and hilar indistinctness. Increased hazy opacities throughout the lungs. Peripheral interstitial lines. Stable cardiomediastinal contours. Retrocardiac opacities. Tiny effusions. No pneumothorax. Unchanged tunneled right IJ double-lumen catheter positioning. Increased airspace disease most consistent with worsening pulmonary edema. Blunted costophrenic sulci favored to represent pleural reflections given hyperexpansion rather than small effusions. Fluctuating retrocardiac opacity, most consistent with atelectasis and edema. Aspiration or pneumonia not excluded. Xr Chest Standard (2 Vw)    Result Date: 2/5/2018  EXAMINATION: TWO VIEWS OF THE CHEST 2/5/2018 8:56 pm COMPARISON: November 24, 2015 HISTORY: ORDERING SYSTEM PROVIDED HISTORY: shortness of breath TECHNOLOGIST PROVIDED HISTORY: Reason for exam:->shortness of breath Ordering Physician Provided Reason for Exam: shortness of breath Acuity: Acute Type of Exam: Initial FINDINGS: Right hilar fullness has increased. chest. Stable mild cardiomegaly. Atherosclerotic calcification of the thoracic aorta. Cardiac and mediastinal contours remain unchanged. Hyperinflation of both lungs with stable pulmonary vascular congestion. Underlying COPD. Persistent parahilar opacities, right greater than left, unchanged. Stable small bilateral pleural effusions. Stable biapical scarring and pleural thickening. Stable left basilar atelectasis. Visualized osseous structures remain unchanged. No pneumothorax. No free air. Overall, stable portable chest compared with 02/23/2018, 712 hours. Xr Chest Portable    Result Date: 2/23/2018  EXAMINATION: SINGLE VIEW OF THE CHEST 2/23/2018 7:07 am COMPARISON: Two-view chest from 02/21/2018 HISTORY: ORDERING SYSTEM PROVIDED HISTORY: CHF TECHNOLOGIST PROVIDED HISTORY: Reason for exam:->CHF Ordering Physician Provided Reason for Exam: FB/U CHF Acuity: Acute Type of Exam: Subsequent/Follow-up Additional signs and symptoms: FB/U CHF Additional history of chronic obstructive pulmonary disease, long-term tobacco abuse, diabetes, vitamin-D deficiency, and kidney disease with the right sided PermCath for dialysis. FINDINGS: Overlying ECG monitor leads and snaps. Right IJ PermCath with its tips in the SVC. Enlarged but stable appearing cardiac silhouette. Mediastinal structures midline and and unremarkable. Hyperinflated lungs with slight improvement suspected pulmonary vascular congestion/ pulmonary edema. Some persistent perihilar opacities, greater on the right, and additional stable findings including small pleural effusions, apical scarring and pleural thickening and mostly left basilar atelectasis. Bones and soft tissues stable. Some interval improvement pulmonary edema, but otherwise stable findings.      Xr Chest Portable    Result Date: 2/19/2018  EXAMINATION: SINGLE VIEW OF THE CHEST 2/19/2018 8:53 am COMPARISON: Previous chest x-ray from 02/14/2018 HISTORY: [address] [place] [address] +---------------+---------------+------------+------------+----------------+ ! Calf           !49             !0.36        !            !                ! +---------------+---------------+------------+------------+----------------+ ! Ankle PT       !33             !0.24        !            !                ! +---------------+---------------+------------+------------+----------------+ ! Ankle DP       !33             !0.24        !            !                ! +---------------+---------------+------------+------------+----------------+   - Left Brachial Pressure:138. - Left NICO:0.24. Plethysmographic Digit Evaluation +---------++--------+-----+---------------++--------+-----+----------------+ ! ! !Right   ! ! Left           !!        !     !                ! +---------++--------+-----+---------------++--------+-----+----------------+ ! Location ! !Pressure! Ratio! PPG Wave Form  ! !Pressure! Ratio! PPG Wave Form   ! +---------++--------+-----+---------------++--------+-----+----------------+ ! Great Toe!!20      !0.14 !               !!21      !0.15 !                ! +---------++--------+-----+---------------++--------+-----+----------------+        Consultations:    Consults:     Final Specialist Recommendations/Findings:   IP CONSULT TO PRIMARY CARE PROVIDER  IP CONSULT TO PULMONOLOGY  IP CONSULT TO NEPHROLOGY  IP CONSULT TO PULMONOLOGY  IP CONSULT TO CARDIOLOGY  IP CONSULT TO GI  IP CONSULT TO UROLOGY  IP CONSULT TO GENERAL SURGERY  PHARMACY TO DOSE VANCOMYCIN  IP CONSULT TO NEUROLOGY  IP CONSULT TO VASCULAR SURGERY  IP CONSULT TO PAIN MANAGEMENT  IP CONSULT TO PALLIATIVE CARE      The patient was seen and examined on day of discharge and this discharge summary is in conjunction with any daily progress note from day of discharge.     Discharge plan:       Disposition: Hospice, inpatient    Physician Follow Up:   MD Danis Curry 23  [address]. Samina Lanier 62          Nell Valverde

## 2018-02-27 NOTE — PROGRESS NOTES
history of cancer      Hernia, ventral      Hyperlipidemia      Iron deficiency anemia      Lichen simplex chronicus      PUD (peptic ulcer disease)      Renal cyst      Type II or unspecified type diabetes mellitus without mention of complication, not stated as uncontrolled      Vitamin D deficiency               Past Surgical History:    Past Surgical History                 Procedure Laterality Date    ABDOMINAL AORTIC ANEURYSM REPAIR        ME INSERT NON-TUNNEL CV CATH Right 2/19/2018     CATHETER INSERTION HEMODIALYSIS performed by Juliana Sotomayor DO at [address] Medications:   Home Medications                 Prior to Admission medications    Medication Sig Start Date End Date Taking?  Authorizing Provider   carvedilol (COREG) 6.25 MG tablet Take 6.25 mg by mouth 2 times daily (with meals)     Yes Historical Provider, MD   hydrALAZINE (APRESOLINE) 10 MG tablet Take 10 mg by mouth 3 times daily     Yes Historical Provider, MD   sodium bicarbonate 650 MG tablet Take 650 mg by mouth 2 times daily     Yes Historical Provider, MD   tamsulosin (FLOMAX) 0.4 MG capsule Take 0.4 mg by mouth daily     Yes Historical Provider, MD   oxyCODONE-acetaminophen (PERCOCET)  MG per tablet   1/8/16   Yes Historical Provider, MD   escitalopram (LEXAPRO) 20 MG tablet Take 20 mg by mouth daily.       Yes Historical Provider, MD   famotidine (PEPCID) 20 MG tablet Take 20 mg by mouth 2 times daily.       Yes Historical Provider, MD   aspirin 81 MG EC tablet Take 81 mg by mouth daily.       Yes Historical Provider, MD   Iron (FEOSOL PO) Take 325 mg by mouth 2 times daily.     Yes Historical Provider, MD   therapeutic multivitamin-minerals (THERAGRAN-M) tablet Take 1 tablet by mouth daily.       Yes Historical Provider, MD   rosuvastatin (CRESTOR) 20 MG tablet Take 20 mg by mouth daily.       Yes Historical Provider, MD   albuterol (PROVENTIL) (2.5 MG/3ML) 0.083% nebulizer solution Take 2.5 mg by nebulization every 6 hours as needed.       Yes Historical Provider, MD   fluticasone-salmeterol (ADVAIR DISKUS) 500-50 MCG/DOSE diskus inhaler Inhale 1 puff into the lungs 2 times daily.       Yes Historical Provider, MD   tiotropium (SPIRIVA) 18 MCG inhalation capsule Inhale 18 mcg into the lungs daily.       Yes Historical Provider, MD   levalbuterol (XOPENEX HFA) 45 MCG/ACT inhaler Inhale 1-2 puffs into the lungs every 4 hours as needed.       Yes Historical Provider, MD             Allergies:  Codeine and Contrast [iodides]     Social History:  Social History   Social History                Social History    Marital status:        Spouse name: N/A    Number of children: N/A    Years of education: N/A            Occupational History    Not on file.                Social History Main Topics    Smoking status: Current Every Day Smoker       Packs/day: 2.00       Types: Cigarettes    Smokeless tobacco: Not on file    Alcohol use No    Drug use: No    Sexual activity: Not on file              Other Topics Concern    Not on file            Social History Narrative    No narrative on file            Family History:   Family History   History reviewed. No pertinent family history.         REVIEW OF SYSTEMS:  Review of Systems   Constitutional: Negative.    HENT: Negative.    Respiratory: Positive for cough and denies shortness of breath.    Gastrointestinal: Positive for abdominal pain. Genitourinary:        On dialysis   Skin: Negative.    Neurological:        Burning feet and legs               PHYSICAL EXAM:  Vitals:    02/27/18 0800   BP: (!) 132/59   Pulse: 102   Resp: 19   Temp: 98.4 °F (36.9 °C)   SpO2: 93%     Physical Exam   Constitutional: He is oriented to person, place, and time. Elderly, frail but alert and engaging gentlemanon Bipapcannula.   HENT:   Head: Normocephalic. Eyes: EOM are normal.  Cardiovascular: Normal rate.  Exam reveals no gallop and no friction rub.    Murmur heard. Pulmonary/Chest: Effort normal and breath sounds normal. .  He has coarse breath sounds. Rhonchi., wheezes  Abdominal: Bowel sounds are normal. He exhibits distension. There is tenderness. Scar over adomen  Musculoskeletal:   Had gross movement of lower extremities bilaterally.   Neurological: He is alert and oriented to person, place, and time. Skin: Skin is warm and dry. Skin over lower extremities has 'burning' sensation upon palpation bilaterally up to the level of the knee No discoloration or swelling. Psychiatric: His behavior is normal. Thought content normal.                  DATA:  Old records have not been requested     IMPRESSION  1. Neuropathy ([facility] [address]    2. COPD exacerbation ([facility] [address]    3. Gastrointestinal hemorrhage, unspecified gastrointestinal hemorrhage type    4.  Anemia, unspecified type                RECOMMENDATIONS: per Dr Naheed Ricks   Pregabalin 50 mg bid  Will continue to follow     \

## 2018-02-27 NOTE — PROGRESS NOTES
Nephrology Progress Note    Subjective/     Patient is being followed by nephrology for the management of acute kidney injury superimposed on chronic kidney disease stage 4. This is a 75 y.o. male active smoker of 2 PPD for many years, hyperlipidemia, COPD and chronic kidney disease stage 4 secondary to hypertension + type 2 DM (baseline creatinine of 2.6-2.8 mg/dl), who presented with decreased urine output and general weakness and was found to have a potassium of 6.0 and BUN/creatinne of 77/3.70 mg/dl. He was seen and examined on hemodialysis today and does not have shortness of breath but complains of back pain, bilateral knee and leg pain. Could not get MRI Lumbar spine because patient couldn't lie flat. He had a right IJ tunneled catheter placed on 2/19/2018. Has right foot drop and being investigated. Interval history:    Had dialysis yesterday   Patient remains BIPAP dependent. which may represent pleural fluid and   atelectasis with underlying pneumonia not excluded       Objective/     Vitals:    02/27/18 0900 02/27/18 1000 02/27/18 1055 02/27/18 1100   BP: (!) 124/57 120/68  136/64   Pulse: 110 103  99   Resp: 24 21 24 23   Temp:       TempSrc:       SpO2: 90% (!) 85% 97% 94%   Weight:       Height:         24HR INTAKE/OUTPUT:      Intake/Output Summary (Last 24 hours) at 02/27/18 1203  Last data filed at 02/27/18 0600   Gross per 24 hour   Intake              150 ml   Output             2180 ml   Net            -2030 ml     Patient Vitals for the past 96 hrs (Last 3 readings):   Weight   02/26/18 1457 115 lb 1.3 oz (52.2 kg)   02/26/18 1145 119 lb 7.8 oz (54.2 kg)   02/25/18 2330 117 lb 1 oz (53.1 kg)       Constitutional:  Alert, awake,but in apparent distress, on BIPAP  Cardiovascular:  S1, S2 without m/r/g  Respiratory:  Diminished. Abdomen: +bs, soft, nt  Ext: Decreased LE edema  Right foot drop.     Data/  Recent Labs      02/25/18   4226  02/25/18   1658  02/26/18   9140  02/27/18

## 2018-02-27 NOTE — PROGRESS NOTES
[address] [address]    Progress Note    2/27/2018    8:50 AM    Name:   Bernice Khan  MRN:     [id number]     Shilalyside:      [de-identified]   Room:   2012/2012-01  IP Day:  25  Admit Date:  2/5/2018  7:29 PM    PCP:   Jared Gavin MD  Code Status:  Limited    Subjective:     C/C:   Chief Complaint   Patient presents with    Shortness of Breath     Interval History Status: improved. Patient resting in bed. Back on 3 LPM, which is home dose. No complaints overnight except for B/L heel pain. No improvement from PO Lyrica, states that his pain is actually worse. Plan to meet with Hospice reps at 11:00 AM today, discussion of comfort care. Brief History:     The patient is a 75 y.o. Non-/non  male wit hPMHx of COPD on 3 LPM, Systolic CHF, and CKD baseline Cr of 2.90 who presents with Shortness of Breath. Patient was at PCP on 2/1 for similar complaint, and was given Levaquin and medrol dose pack PO. At the time, he had a productive cough, and chest pain with coughing. He had no improvement in his Sx, and came to ED for revaluation. In the ED, his O2 sat was at 85% on 3 LPM. He was given 4 LPM by Venturi mask. Pulmonology was consulted. CXR showed small B/L Pleural effusions and R sided hilar density. His Cr was also elevated at 3.70, so nephrology was consulted. Hgb was 7.0 in ED and he was given 1 U PRBC. Pt denied SANJAY, but reports possible bloody stools. He was given IV solumedrol bolus, and admitted to ICU intermediate   for the management of  Acute on Chronic Respiratory Failure     Review of Systems:     Review of Systems   Constitutional: Negative for chills and fever. Eyes: Negative for blurred vision. Respiratory: Negative for cough, hemoptysis and shortness of breath. Cardiovascular: Negative for chest pain, palpitations, orthopnea and leg swelling. Gastrointestinal: Positive for abdominal pain.  Negative for heartburn, nausea and vomiting. Musculoskeletal: Positive for back pain and joint pain (\"Butt pain\", b/L Heel pain). Neurological: Negative for weakness. Medications: Allergies: Allergies   Allergen Reactions    Codeine     Contrast [Iodides]      IVP dyes       Current Meds:   Scheduled Meds:    methylPREDNISolone  20 mg Intravenous Q8H    famotidine  20 mg Oral Daily    clotrimazole   Topical BID    ipratropium-albuterol  1 ampule Inhalation Q4H    pregabalin  50 mg Oral BID    linezolid  600 mg Oral 2 times per day    heparin (porcine)  5,000 Units Subcutaneous 2 times per day    tamsulosin  0.4 mg Oral Daily    darbepoetin trang-polysorbate  100 mcg Subcutaneous Weekly    hydrALAZINE  50 mg Oral 3 times per day    isosorbide mononitrate  30 mg Oral Daily    carvedilol  6.25 mg Oral BID WC    heparin (porcine)  10,000 Units Intercatheter Once    insulin lispro  0-6 Units Subcutaneous TID WC    insulin lispro  0-3 Units Subcutaneous Nightly    docusate sodium  200 mg Oral Daily    sodium chloride flush  10 mL Intravenous 2 times per day    mometasone-formoterol  2 puff Inhalation BID    sodium chloride flush  10 mL Intravenous Q12H     Continuous Infusions:    dextrose       PRN Meds: albumin human, heparin (porcine), heparin (porcine), diazepam, oxyCODONE-acetaminophen **AND** oxyCODONE, fentanNYL, hydrALAZINE, sodium chloride flush, acetaminophen, magnesium hydroxide, bisacodyl, ondansetron, nicotine, albuterol, glucose, dextrose, glucagon (rDNA), dextrose, ipratropium-albuterol, sodium chloride flush    Data:     Past Medical History:   has a past medical history of Abdominal pain, chronic, generalized; Cervicodynia; Chronic back pain; Chronic kidney disease; Cigarette smoker; COPD (chronic obstructive pulmonary disease) (HCC); DDD (degenerative disc disease), lumbar; Depression; Family history of cancer; Hernia, ventral; Hyperlipidemia; Iron deficiency anemia;  Lichen simplex chronicus; PUD (peptic ulcer disease); Renal cyst; Type II or unspecified type diabetes mellitus without mention of complication, not stated as uncontrolled; and Vitamin D deficiency. Social History:   reports that he has been smoking Cigarettes. He has been smoking about 2.00 packs per day. He does not have any smokeless tobacco history on file. He reports that he does not drink alcohol or use drugs. Family History: History reviewed. No pertinent family history. Vitals:  BP (!) 132/59   Pulse 102   Temp 98.4 °F (36.9 °C) (Oral)   Resp 19   Ht 5' 8\" (1.727 m)   Wt 115 lb 1.3 oz (52.2 kg)   SpO2 93%   BMI 17.50 kg/m²   Temp (24hrs), Av.5 °F (36.9 °C), Min:98.2 °F (36.8 °C), Max:98.8 °F (37.1 °C)    Recent Labs      18   0734  18   1053  18   2104  18   0711   POCGLU  95  129*  138*  126*       I/O (24Hr): Intake/Output Summary (Last 24 hours) at 18 0850  Last data filed at 18 0600   Gross per 24 hour   Intake              150 ml   Output             2180 ml   Net            -2030 ml       Labs:    @CBC@  basic metabolic panel    Lab Results   Component Value Date/Time    SPECIAL NOT REPORTED 02/15/2018 06:04 PM     Lab Results   Component Value Date/Time    CULTURE (A) 02/15/2018 06:04 PM     METHICILLIN RESISTANT STAPHYLOCOCCUS AUREUS HEAVY GROWTH    CULTURE NORMAL RESPIRATORY ALLY LIGHT GROWTH 02/15/2018 06:04 PM    CULTURE  02/15/2018 06:04 PM     Performed at [facility] [address], [address] [place] [address] [phone]       [facility]    Radiology:    Ct Abdomen Pelvis Wo Contrast Additional Contrast? Radiologist Recommendation    Result Date: 2018  EXAMINATION: CT OF THE ABDOMEN AND PELVIS WITHOUT CONTRAST 2018 11:03 am TECHNIQUE: CT of the abdomen and pelvis was performed without the administration of intravenous contrast. Multiplanar reformatted images are provided for review.  Dose modulation, iterative reconstruction, and/or 2/5/2018  EXAMINATION: TWO VIEWS OF THE CHEST 2/5/2018 8:56 pm COMPARISON: November 24, 2015 HISTORY: ORDERING SYSTEM PROVIDED HISTORY: shortness of breath TECHNOLOGIST PROVIDED HISTORY: Reason for exam:->shortness of breath Ordering Physician Provided Reason for Exam: shortness of breath Acuity: Acute Type of Exam: Initial FINDINGS: Right hilar fullness has increased. Small pleural effusions are suspected. Chronic interstitial lung changes are again seen. Cardiomegaly. No pulmonary edema. Diffuse osteopenia with thoracic spine degenerative changes. Small pleural effusions. Increased right hilar density may reflect underlying adenopathy. Follow-up with chest CT may be helpful. Xr Femur Right (min 2 Views)    Result Date: 2/12/2018  EXAMINATION: 2 VIEWS OF THE RIGHT FEMUR 2/12/2018 9:41 am COMPARISON: None. HISTORY: ORDERING SYSTEM PROVIDED HISTORY: fall TECHNOLOGIST PROVIDED HISTORY: portable Reason for exam:->fall Ordering Physician Provided Reason for Exam: fall, right femur pain Acuity: Unknown Type of Exam: Unknown FINDINGS: Two views right femur obtained portably show no convincing evidence of acute fracture or dislocation. Mild degenerative changes of the hip. There are vascular calcifications. No radiopaque soft tissue foreign bodies. No localized soft tissue swelling is seen. Surgical clip in the pelvis. If the hip is the area of specific clinical concern dedicated views are recommended. No convincing evidence of acute fracture. Ct Chest Wo Contrast    Result Date: 2/6/2018  EXAMINATION: CT OF THE CHEST WITHOUT CONTRAST 2/6/2018 2:41 pm TECHNIQUE: CT of the chest was performed without the administration of intravenous contrast. Multiplanar reformatted images are provided for review. Dose modulation, iterative reconstruction, and/or weight based adjustment of the mA/kV was utilized to reduce the radiation dose to as low as reasonably achievable.  COMPARISON: January 8, 2016 HISTORY: am COMPARISON: February 5, 2018 HISTORY: ORDERING SYSTEM PROVIDED HISTORY: Crackles on Auscultation TECHNOLOGIST PROVIDED HISTORY: Reason for exam:->Crackles on Auscultation Ordering Physician Provided Reason for Exam: crackles Acuity: Unknown Type of Exam: Unknown FINDINGS: The cardiomediastinal silhouette is stable. There is flattening of the diaphragms, likely related to COPD. There is mild pulmonary vascular congestion. There is mild atelectasis and small pleural effusions at the bilateral lung bases. No evidence of pneumothorax. The osseous structures are stable. Mild cardiomegaly. Mild pulmonary vascular congestion. Probable COPD. Mild atelectasis and small pleural effusions at the bilateral lung bases. Xr Chest Portable    Result Date: 2/6/2018  EXAMINATION: SINGLE VIEW OF THE CHEST 2/6/2018 6:52 am COMPARISON: 02/05/2018 HISTORY: ORDERING SYSTEM PROVIDED HISTORY: AECOPD TECHNOLOGIST PROVIDED HISTORY: Reason for exam:->AECOPD Acuity: Unknown Type of Exam: Unknown Additional signs and symptoms: Following COPD. FINDINGS: Stable mild cardiomegaly and mild pulmonary vascular congestion. Pulmonary emphysema with bilateral fibrotic changes. Bilateral apical pleural thickening. Emphysematous bulla in the right lung base. Bilateral prominent yinka consistent with enlargement of the central pulmonary arteries. Right suprahilar parenchymal consolidation extending into the right upper lobe, unchanged. Blunting of the costophrenic angle suggesting small effusions. No pneumothorax. No significant interval radiographic change.      Vl Dup Lower Extremity Arteries Bilateral    Result Date: 2/15/2018    [facility]  Vascular Lower Extremities Arterial Duplex Procedure   [facility]  Date of Study           02/15/2018   Date of Birth 1950     Gender                  Male   Age           75 year(s)     Race                    Other   Room Number   2007           Height: 67.72 inch, 172 cm   Corporate ID  [id number]     Weight:                 128 pounds, 58 kg  #   Patient Acct  [de-identified]  BSA:        1.69 m^2    BMI:      19.61  #                                                              kg/m^2   MR #          [id number]         Sonographer             Casper Amador   Accession #   [id number]      Interpreting Physician  Emma Chandler   Referring                    Referring Physician     Clifford Medrano  Nurse  Practitioner  Procedure Type of Study:   Extremities Arteries: Lower Extremities Arterial Duplex, Arterial Scan  Lower Bilateral.  Indications for Study:Absent pulses. Patient Status: In Patient. Technical Quality:Limited visualization. Limitation reason:Patient movement. Comments:Incidental finding: Patient appears to have a femoral to femoral bypass. Bypass appears to be totally occluded when imaged with B-Mode. Bypass has no detectable velocities. Conclusions   Summary   Simultaneous real time imaging utilizing B-Mode, color doppler and  spectral waveform analysis was performed on bilateral lower extremities  for arterial examination: Study demonstrates:   ABNORMAL STUDY   Occluded femoral to femoral bypass graft. Severely reduced velocities with monophasic waveforms in the external  iliac, common femoral, profunda, superficial femoral, and popliteal  arteries bilateraly concerning for aortoiliac disease. Recommendations   Correlate clinically.    Signature   ----------------------------------------------------------------  Electronically signed by Casper Amador(Sonographer) on  02/15/2018 05:20 PM  ----------------------------------------------------------------   ----------------------------------------------------------------  Electronically signed by Thea Meckel, Mohammed(Interpreting  physician) on 02/15/2018 06:02 PM  ----------------------------------------------------------------  Findings:   Right Impression:                   Left Impression: +------------++-----+-----+--------+--------++----+-----+--------+---------+ ! Prox        !!22.3 !1.04 !        !7       !!17.9!0.6  ! !6        ! !Popliteal   !!     !     !        !        !!    !     !        !         ! +------------++-----+-----+--------+--------++----+-----+--------+---------+ ! Dist        !!19.7 !0.88 !        !6       !!15.1!0.84 !        !7        ! !Popliteal   !!     !     !        !        !!    !     !        !         ! +------------++-----+-----+--------+--------++----+-----+--------+---------+ ! Mid PTA     !!39.8 !2.02 !        !        !!37.6!2.49 !        !         ! +------------++-----+-----+--------+--------++----+-----+--------+---------+ ! Mid Peroneal!!     !     !        !        !!34.3!2.27 !        !         ! +------------++-----+-----+--------+--------++----+-----+--------+---------+    Vl Lower Extremity Bilateral Venous Duplex    Result Date: 2/13/2018    [facility]  Vascular Lower Extremities DVT Study Procedure   Patient Name   [facility] Date of Study           02/13/2018                 C   Date of Birth  1950  Gender                  Male   Age            75 year(s)  Race                    Other   Room Number    2007        Height:                 67.72 inch, 172 cm   Corporate ID # [id number]  Weight:                 128 pounds, 58 kg   Patient Acct # [de-identified]   BSA:        1.69 m^2    BMI:      19.61 kg/m^2   MR #           [id number]      Sonographer             Chevy Mooney T   Accession #    [id number]   Interpreting Physician  Zoe Schultz   Referring                  Referring Physician     Kenisha Richards  Nurse  Practitioner  Procedure Type of Study:   Veins: Lower Extremities DVT Study, Venous Scan Lower Bilateral.  Indications for Study:R/O DVT. Patient Status: In Patient. Technical Quality:Limited visualization. Comments:Exam performed by [address] [place] [address].   Conclusions   Summary   No evidence of superficial or deep venous thrombosis in both lower  extremities. Technically limited visualization. Signature   ----------------------------------------------------------------  Electronically signed by Martin Pedersen(Interpreting  physician) on 02/13/2018 08:51 PM  ----------------------------------------------------------------   ----------------------------------------------------------------  Electronically signed by Luis Colvin RVT(Sonographer) on  02/13/2018 05:44 PM  ----------------------------------------------------------------  Findings:   Right Impression:                    Left Impression:  The common femoral, femoral,         The common femoral, femoral,  popliteal and tibial veins           popliteal and tibial veins  demonstrate normal compressibility   demonstrate normal compressibility  and augmentation. and augmentation. Limited visualization of the         Limited visualization of the  posterior tibial and peroneal veins. posterior tibial and peroneal veins. Normal compressibility of the great  Normal compressibility of the great  saphenous vein. saphenous vein. Normal compressibility of the small  Normal compressibility of the small  saphenous vein. saphenous vein. Velocities are measured in cm/s ; Diameters are measured in mm Right Lower Extremities DVT Study Measurements Right 2D Measurements +------------------------------------+----------+---------------+----------+ ! Location                            ! Visualized! Compressibility! Thrombosis! +------------------------------------+----------+---------------+----------+ ! Common Femoral                      !Yes       ! Yes            ! None      ! +------------------------------------+----------+---------------+----------+ ! Prox Femoral                        !Yes       ! Yes            ! None      ! ! Left!        !     ! +--------------------------------------++--------+-----+----+--------+-----+ ! Location                              ! !Pressure! Ratio! !Pressure! Ratio! +--------------------------------------++--------+-----+----+--------+-----+ ! Thigh                                 !!48      !0.35 ! !50      !0.36 ! +--------------------------------------++--------+-----+----+--------+-----+ ! Calf                                  !!136     !0.99 !    !49      !0.36 ! +--------------------------------------++--------+-----+----+--------+-----+ ! Ankle PT                              !!30      !0.22 !    !33      !0.24 ! +--------------------------------------++--------+-----+----+--------+-----+ ! Ankle DP                              !!39      !0.28 !    !33      !0.24 ! +--------------------------------------++--------+-----+----+--------+-----+ ! Great Toe                             !!20      !0.14 !    !21      !0.15 ! +--------------------------------------++--------+-----+----+--------+-----+   - Brachial Pressure:Left:138.   - NICO:Right: 0.28. Left: 0.24. Right Plethysmographic Results +---------------+---------------+------------+------------+----------------+ ! Right          !               !            !            !                ! +---------------+---------------+------------+------------+----------------+ ! Location       ! Pressure       ! Ratio       ! Notch       ! Amplitude       ! +---------------+---------------+------------+------------+----------------+ ! Calf           !136            !0.99        !            !                ! +---------------+---------------+------------+------------+----------------+ ! Ankle PT       !30             !0.22        !            !                ! +---------------+---------------+------------+------------+----------------+ ! Ankle DP       !39             !0.28        !            !                ! +---------------+---------------+------------+------------+----------------+   - Right NICO:0.28. Left Plethysmographic Results +---------------+---------------+------------+------------+----------------+ ! Left           !               !            !            !                ! +---------------+---------------+------------+------------+----------------+ ! Location       ! Pressure       ! Ratio       ! Notch       ! Amplitude       ! +---------------+---------------+------------+------------+----------------+ ! Calf           !49             !0.36        !            !                ! +---------------+---------------+------------+------------+----------------+ ! Ankle PT       !33             !0.24        !            !                ! +---------------+---------------+------------+------------+----------------+ ! Ankle DP       !33             !0.24        !            !                ! +---------------+---------------+------------+------------+----------------+   - Left Brachial Pressure:138. - Left NICO:0.24. Plethysmographic Digit Evaluation +---------++--------+-----+---------------++--------+-----+----------------+ ! ! !Right   ! ! Left           !!        !     !                ! +---------++--------+-----+---------------++--------+-----+----------------+ ! Location ! !Pressure! Ratio! PPG Wave Form  ! !Pressure! Ratio! PPG Wave Form   ! +---------++--------+-----+---------------++--------+-----+----------------+ ! Great Toe!!20      !0.14 !               !!21      !0.15 !                ! +---------++--------+-----+---------------++--------+-----+----------------+        Physical Examination:        Physical Exam  Physical Exam   Constitutional: He is oriented to person, place, and time. He appears malnourished. He appears unhealthy. No distress. HENT:   Head: Normocephalic and atraumatic. Neck: Normal range of motion. Neck supple.    Cardiovascular: Normal rate, regular rhythm, normal heart sounds and intact distal pulses. Pulmonary/Chest: No respiratory distress. He has decreased breath sounds. He has wheezes. Course breath sounds, On O2 NC   Abdominal: Soft. Bowel sounds are normal.   Fascial defect present (hx abdominal surg for AAA)   Neurological: He is alert and oriented to person, place, and time. Skin: Skin is warm and dry.    Psychiatric: Affect and judgment normal.     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]     Gastrointestinal hemorrhage [K92.2]     Abdominal wall hernia [K43.9]     Absolute anemia [D64.9]     Acute on chronic respiratory failure with hypercapnia ([facility] [facility] [J96.22] 02/08/2018    Acute on chronic systolic heart failure ([facility]) [I50.23] 02/08/2018    Drug-induced hyperglycemia [R73.9, T50.905A] 02/08/2018    Anemia [D64.9]     Abdominal aortic aneurysm (AAA) ([facility] [facility] [I71.4] 02/06/2018    COPD exacerbation ([facility]) [J44.1] 02/05/2018    Type 2 diabetes mellitus without complication, with long-term current use of insulin ([facility]) [E11.9, Z79.4]     Chronic kidney disease (CKD), stage IV (severe) ([address] [N18.4]        Plan:        R Foot drop and RLL weakness, concerning for cervical stenosis or metastatic diseases to spine   - MRI Lumbar Spine, when respiratory status improved   - MRI will likely have to be reordered   - Can be done as outpatient if patient desires     Urinary Retention, diggs removed 2/19, replaced 2/21  - Patient does not want another SANJAY  - Tamsulosin 0.4 mg QD  - Per Urology, Keep Diggs in  - bladder scans: 90s-100s ml residual     Acute on Chronic Respiratory Failure, improving  - Pulm following: carmelita Byrd, PO Zyvox Day 9, IV Solumedrol 40 mg Q8h  - No Intubation, Pt is DNR-CCA  - He is tolerating O2 NC     Systolic CHF  - EF 76%  - S/p HD for

## 2018-02-27 NOTE — PROGRESS NOTES
Spoke with Patient and wife Ryan Gutierrez and family. Patient has elected to go to hospice for comfort care with discussion for [facility] code status change. Patient is now [facility]. He will be transferred to hospice.

## 2024-01-23 NOTE — PROGRESS NOTES
Nutrition Assessment    Type and Reason for Visit: Reassess    Nutrition Recommendations: Continue diet and supplements as ordered. Malnutrition Assessment:  · Malnutrition Status: Mild Malnutrition  · Context: Acute illness or injury  · Findings of the 6 clinical characteristics of malnutrition (Minimum of 2 out of 6 clinical characteristics is required to make the diagnosis of moderate or severe Protein Calorie Malnutrition based on AND/ASPEN Guidelines):  1. Energy Intake-Less than or equal to 50%, greater than or equal to 5 days    2. Weight Loss-No significant weight loss,    3. Fat Loss-Mild subcutaneous fat loss, Orbital  4. Muscle Loss-Mild muscle mass loss, Temples (temporalis muscle)  5. Fluid Accumulation-No significant fluid accumulation, Extremities    Nutrition Diagnosis:   · Problem: Inadequate oral intake  · Etiology: related to Acute injury/trauma     Signs and symptoms:  as evidenced by Diet history of poor intake    Nutrition Assessment:  · Subjective Assessment: RN reports pt went for tunneled catheter placement this morning and had a later breakfast.  · Nutrition-Focused Physical Findings: Edema: Trace RUE, BLE, +1 non-pitting LUE  · Current Nutrition Therapies:  · Oral Diet Orders: Renal, Carb Control 4 Carbs/Meal, Dental Soft, Fluid Restriction   · Oral Diet intake: 51-75%  · Oral Nutrition Supplement (ONS) Orders:  (Boost chocolate)  · ONS intake: %  · Anthropometric Measures:  · Ht: 5' 8\" (172.7 cm)   · Current Body Wt: 123 lb 14.4 oz (56.2 kg)  · Admission Body Wt: 133 lb 13.1 oz (60.7 kg)  · Ideal Body Wt: 154 lb (69.9 kg), % Ideal Body 80%  · BMI Classification: BMI 18.5 - 24.9 Normal Weight  · Comparative Standards (Estimated Nutrition Needs):  · Estimated Daily Total Kcal: 1830 kcal  · Estimated Daily Protein (g): 79-92 gm    Estimated Intake vs Estimated Needs: Intake Improving    Nutrition Risk Level:  Moderate    Nutrition Interventions:   Continue current diet, Modify hand grasp, leg strength strong and equal bilaterally

## 2024-06-04 NOTE — PLAN OF CARE
Problem: Discharge Planning:  Goal: Discharged to appropriate level of care  Discharged to appropriate level of care   Outcome: Ongoing  Pt remains in ICU intermediate care at this time. Orders received to transfer Pt to PCU. Awaiting open bed. Problem: Activity Intolerance:  Goal: Ability to tolerate increased activity will improve  Ability to tolerate increased activity will improve   Outcome: Ongoing  Pt up to chair this shift without difficulty. Energy conservation encouraged. Problem: Airway Clearance - Ineffective:  Goal: Ability to maintain a clear airway will improve  Ability to maintain a clear airway will improve   Outcome: Ongoing  Pt continues with coughing and deep breathing exercises. Encouraged to wear BiPap mask 10-12 hours per day per order from pulmonologist.     Problem: Breathing Pattern - Ineffective:  Goal: Ability to achieve and maintain a regular respiratory rate will improve  Ability to achieve and maintain a regular respiratory rate will improve   Outcome: Ongoing  Pt breathing easy and non-labored. RR fluctuating between 11-29. No s/s of dyspnea this shift. Problem: Gas Exchange - Impaired:  Goal: Levels of oxygenation will improve  Levels of oxygenation will improve   Outcome: Ongoing  Pt showing no s/s of SOB/dyspnea. SpO2 remains above 90%. HOB elevated above 30 degrees. Remains on O2 at 3L/nc or BiPap PRN 10-12 hours per day. Problem: Falls - Risk of  Goal: Absence of falls  Outcome: Ongoing  Pt remains free from falls. Bed in low position. Call light within reach. Educated to call for assistance. Problem: Pain:  Goal: Pain level will decrease  Pain level will decrease   Outcome: Ongoing  Pt c/o chronic generalized pain this shift. PRN percocet administered x1 and effective. Goal: Control of acute pain  Control of acute pain   Outcome: Ongoing  Pt denies all c/o acute pain this shift.    Goal: Control of chronic pain  Control of chronic pain   Outcome: Ongoing  Pt c/o chronic generalized pain this shift. PRN percocet administered x1 and effective. 97

## (undated) DEVICE — Z INACTIVE USE 2653177 SPONGE GZ W2XL2IN NONWOVEN 4 PLY FASTER WICKING ABIL AVANT

## (undated) DEVICE — MASTISOL ADHESIVE LIQ 2/3ML

## (undated) DEVICE — GLOVE ORANGE PI 7 1/2   MSG9075

## (undated) DEVICE — 14F X 28CM SPLIT CATH® III CATHETER SET (CUFF 23CM FROM TIP): Brand: SPLIT CATH® III

## (undated) DEVICE — PENCIL ES L3M BTTN SWCH HOLSTER W/ BLDE ELECTRD EDGE

## (undated) DEVICE — SUTURE NONABSORBABLE MONOFILAMENT 2-0 FS 18 IN ETHILON 664H

## (undated) DEVICE — DRAPE THYROID

## (undated) DEVICE — SUTURE VIC + BR UD 3-0 27IN VCP423H

## (undated) DEVICE — 6 ML SYRINGE LUER-LOCK TIP: Brand: MONOJECT

## (undated) DEVICE — SOLUTION IV 500ML 0.9% SOD CHL PH 5 INJ USP VIAFLX PLAS

## (undated) DEVICE — SYRINGE, LUER LOCK, 10ML: Brand: MEDLINE

## (undated) DEVICE — ST CHARLES PORT PACK: Brand: MEDLINE INDUSTRIES, INC.

## (undated) DEVICE — STRIP,CLOSURE,WOUND,MEDI-STRIP,1/2X4: Brand: MEDLINE